# Patient Record
Sex: MALE | Race: BLACK OR AFRICAN AMERICAN | NOT HISPANIC OR LATINO | Employment: UNEMPLOYED | URBAN - METROPOLITAN AREA
[De-identification: names, ages, dates, MRNs, and addresses within clinical notes are randomized per-mention and may not be internally consistent; named-entity substitution may affect disease eponyms.]

---
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
## 2019-01-30 ENCOUNTER — HOSPITAL ENCOUNTER (EMERGENCY)
Facility: HOSPITAL | Age: 19
Discharge: HOME | End: 2019-01-31
Attending: EMERGENCY MEDICINE | Admitting: EMERGENCY MEDICINE

## 2019-01-30 DIAGNOSIS — Z20.2 STD EXPOSURE: Primary | ICD-10-CM

## 2019-01-30 PROCEDURE — 87591 N.GONORRHOEAE DNA AMP PROB: CPT | Performed by: EMERGENCY MEDICINE

## 2019-01-30 PROCEDURE — 99283 EMERGENCY DEPT VISIT LOW MDM: CPT

## 2019-01-30 PROCEDURE — 87491 CHLMYD TRACH DNA AMP PROBE: CPT | Performed by: EMERGENCY MEDICINE

## 2019-01-30 PROCEDURE — 81001 URINALYSIS AUTO W/SCOPE: CPT | Performed by: EMERGENCY MEDICINE

## 2019-01-30 RX ORDER — AZITHROMYCIN 250 MG/1
1000 TABLET, FILM COATED ORAL ONCE
Status: COMPLETED | OUTPATIENT
Start: 2019-01-30 | End: 2019-01-31

## 2019-01-30 RX ORDER — CEFTRIAXONE SODIUM 250 MG/1
250 INJECTION, POWDER, FOR SOLUTION INTRAMUSCULAR; INTRAVENOUS ONCE
Status: COMPLETED | OUTPATIENT
Start: 2019-01-30 | End: 2019-01-31

## 2019-01-30 RX ORDER — LIDOCAINE HYDROCHLORIDE 10 MG/ML
0.9 INJECTION, SOLUTION EPIDURAL; INFILTRATION; INTRACAUDAL; PERINEURAL ONCE
Status: COMPLETED | OUTPATIENT
Start: 2019-01-30 | End: 2019-01-31

## 2019-01-31 VITALS
RESPIRATION RATE: 16 BRPM | TEMPERATURE: 97.2 F | HEART RATE: 78 BPM | WEIGHT: 158.3 LBS | HEIGHT: 69 IN | OXYGEN SATURATION: 99 % | SYSTOLIC BLOOD PRESSURE: 146 MMHG | BODY MASS INDEX: 23.44 KG/M2 | DIASTOLIC BLOOD PRESSURE: 80 MMHG

## 2019-01-31 LAB
BACTERIA UR QL AUTO: ABNORMAL /HPF
BILIRUB UR QL STRIP: NEGATIVE
CLARITY UR: ABNORMAL
COLOR UR: YELLOW
GLUCOSE UR STRIP-MCNC: NEGATIVE MG/DL
HGB UR QL STRIP.AUTO: NEGATIVE
HYALINE CASTS UR QL AUTO: ABNORMAL /LPF
KETONES UR QL STRIP: ABNORMAL
LEUKOCYTE ESTERASE UR QL STRIP.AUTO: ABNORMAL
NITRITE UR QL STRIP: NEGATIVE
PH UR STRIP.AUTO: 6.5 [PH] (ref 5–8)
PROT UR QL STRIP: NEGATIVE
RBC # UR: ABNORMAL /HPF
REF LAB TEST METHOD: ABNORMAL
SP GR UR STRIP: 1.03 (ref 1–1.03)
SQUAMOUS #/AREA URNS HPF: ABNORMAL /HPF
UROBILINOGEN UR QL STRIP: ABNORMAL
WBC UR QL AUTO: ABNORMAL /HPF

## 2019-01-31 PROCEDURE — 25010000002 CEFTRIAXONE PER 250 MG: Performed by: EMERGENCY MEDICINE

## 2019-01-31 PROCEDURE — 96372 THER/PROPH/DIAG INJ SC/IM: CPT

## 2019-01-31 RX ADMIN — AZITHROMYCIN 1000 MG: 250 TABLET, FILM COATED ORAL at 00:16

## 2019-01-31 RX ADMIN — CEFTRIAXONE SODIUM 250 MG: 250 INJECTION, POWDER, FOR SOLUTION INTRAMUSCULAR; INTRAVENOUS at 00:17

## 2019-01-31 RX ADMIN — LIDOCAINE HYDROCHLORIDE 0.9 ML: 10 INJECTION, SOLUTION EPIDURAL; INFILTRATION; INTRACAUDAL; PERINEURAL at 00:17

## 2019-02-03 LAB
C TRACH RRNA SPEC DONR QL NAA+PROBE: POSITIVE
N GONORRHOEA DNA SPEC QL NAA+PROBE: POSITIVE

## 2020-09-17 ENCOUNTER — OFFICE VISIT (OUTPATIENT)
Dept: FAMILY MEDICINE CLINIC | Facility: CLINIC | Age: 20
End: 2020-09-17

## 2020-09-17 VITALS
WEIGHT: 160 LBS | SYSTOLIC BLOOD PRESSURE: 122 MMHG | HEART RATE: 52 BPM | HEIGHT: 70 IN | OXYGEN SATURATION: 99 % | RESPIRATION RATE: 16 BRPM | TEMPERATURE: 98.5 F | BODY MASS INDEX: 22.9 KG/M2 | DIASTOLIC BLOOD PRESSURE: 78 MMHG

## 2020-09-17 DIAGNOSIS — F32.9 MAJOR DEPRESSIVE DISORDER WITH CURRENT ACTIVE EPISODE, UNSPECIFIED DEPRESSION EPISODE SEVERITY, UNSPECIFIED WHETHER RECURRENT: ICD-10-CM

## 2020-09-17 DIAGNOSIS — F90.1 ATTENTION DEFICIT HYPERACTIVITY DISORDER (ADHD), PREDOMINANTLY HYPERACTIVE TYPE: Primary | ICD-10-CM

## 2020-09-17 PROCEDURE — 99204 OFFICE O/P NEW MOD 45 MIN: CPT | Performed by: INTERNAL MEDICINE

## 2020-09-17 RX ORDER — DEXTROAMPHETAMINE SACCHARATE, AMPHETAMINE ASPARTATE, DEXTROAMPHETAMINE SULFATE AND AMPHETAMINE SULFATE 1.25; 1.25; 1.25; 1.25 MG/1; MG/1; MG/1; MG/1
5 TABLET ORAL DAILY
COMMUNITY
End: 2020-09-17

## 2020-09-17 RX ORDER — ACETAMINOPHEN 500 MG
500 TABLET ORAL
COMMUNITY
End: 2021-11-15 | Stop reason: HOSPADM

## 2020-09-17 RX ORDER — GUANFACINE 2 MG/1
2 TABLET ORAL DAILY
COMMUNITY
End: 2020-09-17

## 2020-09-17 RX ORDER — IBUPROFEN 400 MG/1
400 TABLET ORAL
COMMUNITY
End: 2021-11-15 | Stop reason: HOSPADM

## 2020-09-17 RX ORDER — MONTELUKAST SODIUM 10 MG/1
10 TABLET ORAL DAILY
COMMUNITY
End: 2021-11-11
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
## 2020-09-18 DIAGNOSIS — R73.09 ELEVATED GLUCOSE: Primary | ICD-10-CM

## 2020-09-18 LAB
ALBUMIN SERPL-MCNC: 4.8 G/DL (ref 3.5–5.2)
ALBUMIN/GLOB SERPL: 2 G/DL
ALP SERPL-CCNC: 87 U/L (ref 39–117)
ALT SERPL-CCNC: 25 U/L (ref 1–41)
AST SERPL-CCNC: 18 U/L (ref 1–40)
BASOPHILS # BLD AUTO: 0.02 10*3/MM3 (ref 0–0.2)
BASOPHILS NFR BLD AUTO: 0.4 % (ref 0–1.5)
BILIRUB SERPL-MCNC: 0.2 MG/DL (ref 0–1.2)
BUN SERPL-MCNC: 13 MG/DL (ref 6–20)
BUN/CREAT SERPL: 10.7 (ref 7–25)
CALCIUM SERPL-MCNC: 9.6 MG/DL (ref 8.6–10.5)
CHLORIDE SERPL-SCNC: 104 MMOL/L (ref 98–107)
CHOLEST SERPL-MCNC: 163 MG/DL (ref 0–200)
CO2 SERPL-SCNC: 27.6 MMOL/L (ref 22–29)
CREAT SERPL-MCNC: 1.21 MG/DL (ref 0.76–1.27)
EOSINOPHIL # BLD AUTO: 0.12 10*3/MM3 (ref 0–0.4)
EOSINOPHIL NFR BLD AUTO: 2.3 % (ref 0.3–6.2)
ERYTHROCYTE [DISTWIDTH] IN BLOOD BY AUTOMATED COUNT: 13.7 % (ref 12.3–15.4)
GLOBULIN SER CALC-MCNC: 2.4 GM/DL
GLUCOSE SERPL-MCNC: 120 MG/DL (ref 65–99)
HCT VFR BLD AUTO: 42.8 % (ref 37.5–51)
HDLC SERPL-MCNC: 56 MG/DL (ref 40–60)
HGB BLD-MCNC: 15 G/DL (ref 13–17.7)
IMM GRANULOCYTES # BLD AUTO: 0.01 10*3/MM3 (ref 0–0.05)
IMM GRANULOCYTES NFR BLD AUTO: 0.2 % (ref 0–0.5)
LDLC SERPL CALC-MCNC: 76 MG/DL (ref 0–100)
LYMPHOCYTES # BLD AUTO: 2.1 10*3/MM3 (ref 0.7–3.1)
LYMPHOCYTES NFR BLD AUTO: 40.4 % (ref 19.6–45.3)
MCH RBC QN AUTO: 31 PG (ref 26.6–33)
MCHC RBC AUTO-ENTMCNC: 35 G/DL (ref 31.5–35.7)
MCV RBC AUTO: 88.4 FL (ref 79–97)
MONOCYTES # BLD AUTO: 0.31 10*3/MM3 (ref 0.1–0.9)
MONOCYTES NFR BLD AUTO: 6 % (ref 5–12)
NEUTROPHILS # BLD AUTO: 2.64 10*3/MM3 (ref 1.7–7)
NEUTROPHILS NFR BLD AUTO: 50.7 % (ref 42.7–76)
NRBC BLD AUTO-RTO: 0 /100 WBC (ref 0–0.2)
PLATELET # BLD AUTO: 237 10*3/MM3 (ref 140–450)
POTASSIUM SERPL-SCNC: 4.6 MMOL/L (ref 3.5–5.2)
PROT SERPL-MCNC: 7.2 G/DL (ref 6–8.5)
RBC # BLD AUTO: 4.84 10*6/MM3 (ref 4.14–5.8)
SODIUM SERPL-SCNC: 142 MMOL/L (ref 136–145)
T4 FREE SERPL-MCNC: 1.69 NG/DL (ref 0.93–1.7)
TRIGL SERPL-MCNC: 153 MG/DL (ref 0–150)
TSH SERPL DL<=0.005 MIU/L-ACNC: 1.69 UIU/ML (ref 0.27–4.2)
VLDLC SERPL CALC-MCNC: 30.6 MG/DL
WBC # BLD AUTO: 5.2 10*3/MM3 (ref 3.4–10.8)

## 2020-09-23 ENCOUNTER — RESULTS ENCOUNTER (OUTPATIENT)
Dept: FAMILY MEDICINE CLINIC | Facility: CLINIC | Age: 20
End: 2020-09-23

## 2020-09-23 DIAGNOSIS — R73.09 ELEVATED GLUCOSE: ICD-10-CM

## 2020-10-08 ENCOUNTER — OFFICE VISIT (OUTPATIENT)
Dept: FAMILY MEDICINE CLINIC | Facility: CLINIC | Age: 20
End: 2020-10-08

## 2020-10-08 VITALS
BODY MASS INDEX: 22.28 KG/M2 | OXYGEN SATURATION: 99 % | HEART RATE: 53 BPM | WEIGHT: 155.6 LBS | HEIGHT: 70 IN | SYSTOLIC BLOOD PRESSURE: 108 MMHG | TEMPERATURE: 99.7 F | DIASTOLIC BLOOD PRESSURE: 70 MMHG

## 2020-10-08 DIAGNOSIS — R73.09 ELEVATED GLUCOSE: Primary | ICD-10-CM

## 2020-10-08 LAB
EXPIRATION DATE: NORMAL
HBA1C MFR BLD: 5.7 %
Lab: NORMAL

## 2020-10-08 PROCEDURE — 83036 HEMOGLOBIN GLYCOSYLATED A1C: CPT | Performed by: INTERNAL MEDICINE

## 2020-10-08 PROCEDURE — 99213 OFFICE O/P EST LOW 20 MIN: CPT | Performed by: INTERNAL MEDICINE
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
## 2020-11-12 ENCOUNTER — OFFICE VISIT (OUTPATIENT)
Dept: FAMILY MEDICINE CLINIC | Facility: CLINIC | Age: 20
End: 2020-11-12

## 2020-11-12 VITALS
SYSTOLIC BLOOD PRESSURE: 130 MMHG | OXYGEN SATURATION: 98 % | HEART RATE: 57 BPM | HEIGHT: 70 IN | WEIGHT: 153.6 LBS | BODY MASS INDEX: 21.99 KG/M2 | DIASTOLIC BLOOD PRESSURE: 78 MMHG | RESPIRATION RATE: 16 BRPM

## 2020-11-12 DIAGNOSIS — Z00.00 ANNUAL PHYSICAL EXAM: Primary | ICD-10-CM

## 2020-11-12 DIAGNOSIS — E78.1 HYPERTRIGLYCERIDEMIA: ICD-10-CM

## 2020-11-12 PROCEDURE — 99395 PREV VISIT EST AGE 18-39: CPT | Performed by: INTERNAL MEDICINE
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
## 2020-12-10 ENCOUNTER — RESULTS ENCOUNTER (OUTPATIENT)
Dept: FAMILY MEDICINE CLINIC | Facility: CLINIC | Age: 20
End: 2020-12-10

## 2020-12-10 ENCOUNTER — OFFICE VISIT (OUTPATIENT)
Dept: FAMILY MEDICINE CLINIC | Facility: CLINIC | Age: 20
End: 2020-12-10

## 2020-12-10 VITALS
BODY MASS INDEX: 22.96 KG/M2 | SYSTOLIC BLOOD PRESSURE: 116 MMHG | RESPIRATION RATE: 16 BRPM | WEIGHT: 155 LBS | TEMPERATURE: 97.6 F | DIASTOLIC BLOOD PRESSURE: 78 MMHG | HEART RATE: 57 BPM | OXYGEN SATURATION: 99 % | HEIGHT: 69 IN

## 2020-12-10 DIAGNOSIS — R46.89 BEHAVIORAL CHANGE: ICD-10-CM

## 2020-12-10 DIAGNOSIS — R25.1 OCCASIONAL TREMORS: Primary | ICD-10-CM

## 2020-12-10 DIAGNOSIS — S06.9X9S TRAUMATIC BRAIN INJURY WITH LOSS OF CONSCIOUSNESS, SEQUELA (HCC): ICD-10-CM

## 2020-12-10 PROCEDURE — 99214 OFFICE O/P EST MOD 30 MIN: CPT | Performed by: INTERNAL MEDICINE
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
## 2021-01-06 ENCOUNTER — DOCUMENTATION (OUTPATIENT)
Dept: FAMILY MEDICINE CLINIC | Facility: CLINIC | Age: 21
End: 2021-01-06

## 2021-01-06 ENCOUNTER — HOSPITAL ENCOUNTER (EMERGENCY)
Facility: HOSPITAL | Age: 21
Discharge: HOME | End: 2021-01-06
Attending: EMERGENCY MEDICINE | Admitting: EMERGENCY MEDICINE

## 2021-01-06 ENCOUNTER — HOSPITAL ENCOUNTER (OUTPATIENT)
Dept: MRI IMAGING | Facility: HOSPITAL | Age: 21
Discharge: HOME | End: 2021-01-06
Admitting: INTERNAL MEDICINE

## 2021-01-06 VITALS
RESPIRATION RATE: 15 BRPM | HEIGHT: 70 IN | WEIGHT: 155 LBS | HEART RATE: 67 BPM | BODY MASS INDEX: 22.19 KG/M2 | OXYGEN SATURATION: 99 % | DIASTOLIC BLOOD PRESSURE: 74 MMHG | SYSTOLIC BLOOD PRESSURE: 112 MMHG | TEMPERATURE: 97.8 F

## 2021-01-06 DIAGNOSIS — S06.9X9S TRAUMATIC BRAIN INJURY WITH LOSS OF CONSCIOUSNESS, SEQUELA (HCC): ICD-10-CM

## 2021-01-06 DIAGNOSIS — Z87.828 OLD HEAD INJURY: Primary | ICD-10-CM

## 2021-01-06 LAB
ALBUMIN SERPL-MCNC: 4.7 G/DL (ref 3.5–5.2)
ALBUMIN/GLOB SERPL: 1.4 G/DL
ALP SERPL-CCNC: 75 U/L (ref 39–117)
ALT SERPL W P-5'-P-CCNC: 20 U/L (ref 1–41)
AMPHET+METHAMPHET UR QL: NEGATIVE
ANION GAP SERPL CALCULATED.3IONS-SCNC: 7.4 MMOL/L (ref 5–15)
AST SERPL-CCNC: 19 U/L (ref 1–40)
BARBITURATES UR QL SCN: NEGATIVE
BASOPHILS # BLD AUTO: 0.03 10*3/MM3 (ref 0–0.2)
BASOPHILS NFR BLD AUTO: 0.6 % (ref 0–1.5)
BENZODIAZ UR QL SCN: NEGATIVE
BILIRUB SERPL-MCNC: 0.3 MG/DL (ref 0–1.2)
BUN SERPL-MCNC: 15 MG/DL (ref 6–20)
BUN/CREAT SERPL: 13.8 (ref 7–25)
CALCIUM SPEC-SCNC: 9.6 MG/DL (ref 8.6–10.5)
CANNABINOIDS SERPL QL: POSITIVE
CHLORIDE SERPL-SCNC: 102 MMOL/L (ref 98–107)
CO2 SERPL-SCNC: 29.6 MMOL/L (ref 22–29)
COCAINE UR QL: NEGATIVE
CREAT SERPL-MCNC: 1.09 MG/DL (ref 0.76–1.27)
DEPRECATED RDW RBC AUTO: 42.2 FL (ref 37–54)
EOSINOPHIL # BLD AUTO: 0.08 10*3/MM3 (ref 0–0.4)
EOSINOPHIL NFR BLD AUTO: 1.7 % (ref 0.3–6.2)
ERYTHROCYTE [DISTWIDTH] IN BLOOD BY AUTOMATED COUNT: 13.4 % (ref 12.3–15.4)
ETHANOL BLD-MCNC: <10 MG/DL (ref 0–10)
ETHANOL UR QL: <0.01 %
GFR SERPL CREATININE-BSD FRML MDRD: 103 ML/MIN/1.73
GLOBULIN UR ELPH-MCNC: 3.4 GM/DL
GLUCOSE SERPL-MCNC: 91 MG/DL (ref 65–99)
HCT VFR BLD AUTO: 45.4 % (ref 37.5–51)
HGB BLD-MCNC: 15.5 G/DL (ref 13–17.7)
IMM GRANULOCYTES # BLD AUTO: 0.01 10*3/MM3 (ref 0–0.05)
IMM GRANULOCYTES NFR BLD AUTO: 0.2 % (ref 0–0.5)
INR PPP: 1.15 (ref 0.9–1.1)
LYMPHOCYTES # BLD AUTO: 2.24 10*3/MM3 (ref 0.7–3.1)
LYMPHOCYTES NFR BLD AUTO: 47.6 % (ref 19.6–45.3)
MCH RBC QN AUTO: 29.9 PG (ref 26.6–33)
MCHC RBC AUTO-ENTMCNC: 34.1 G/DL (ref 31.5–35.7)
MCV RBC AUTO: 87.5 FL (ref 79–97)
METHADONE UR QL SCN: NEGATIVE
MONOCYTES # BLD AUTO: 0.27 10*3/MM3 (ref 0.1–0.9)
MONOCYTES NFR BLD AUTO: 5.7 % (ref 5–12)
NEUTROPHILS NFR BLD AUTO: 2.08 10*3/MM3 (ref 1.7–7)
NEUTROPHILS NFR BLD AUTO: 44.2 % (ref 42.7–76)
NRBC BLD AUTO-RTO: 0 /100 WBC (ref 0–0.2)
OPIATES UR QL: NEGATIVE
OXYCODONE UR QL SCN: NEGATIVE
PLATELET # BLD AUTO: 242 10*3/MM3 (ref 140–450)
PMV BLD AUTO: 10.4 FL (ref 6–12)
POTASSIUM SERPL-SCNC: 4 MMOL/L (ref 3.5–5.2)
PROT SERPL-MCNC: 8.1 G/DL (ref 6–8.5)
PROTHROMBIN TIME: 14.5 SECONDS (ref 11.7–14.2)
RBC # BLD AUTO: 5.19 10*6/MM3 (ref 4.14–5.8)
SODIUM SERPL-SCNC: 139 MMOL/L (ref 136–145)
WBC # BLD AUTO: 4.71 10*3/MM3 (ref 3.4–10.8)

## 2021-01-06 PROCEDURE — 99283 EMERGENCY DEPT VISIT LOW MDM: CPT

## 2021-01-06 PROCEDURE — 85025 COMPLETE CBC W/AUTO DIFF WBC: CPT | Performed by: EMERGENCY MEDICINE

## 2021-01-06 PROCEDURE — 0 GADOBENATE DIMEGLUMINE 529 MG/ML SOLUTION: Performed by: INTERNAL MEDICINE

## 2021-01-06 PROCEDURE — 80307 DRUG TEST PRSMV CHEM ANLYZR: CPT | Performed by: EMERGENCY MEDICINE

## 2021-01-06 PROCEDURE — A9577 INJ MULTIHANCE: HCPCS | Performed by: INTERNAL MEDICINE

## 2021-01-06 PROCEDURE — 70553 MRI BRAIN STEM W/O & W/DYE: CPT

## 2021-01-06 PROCEDURE — 82077 ASSAY SPEC XCP UR&BREATH IA: CPT | Performed by: EMERGENCY MEDICINE

## 2021-01-06 PROCEDURE — 85610 PROTHROMBIN TIME: CPT | Performed by: EMERGENCY MEDICINE

## 2021-01-06 PROCEDURE — 80053 COMPREHEN METABOLIC PANEL: CPT | Performed by: EMERGENCY MEDICINE

## 2021-01-06 RX ORDER — SODIUM CHLORIDE 0.9 % (FLUSH) 0.9 %
10 SYRINGE (ML) INJECTION AS NEEDED
Status: DISCONTINUED | OUTPATIENT
Start: 2021-01-06 | End: 2021-01-06 | Stop reason: HOSPADM

## 2021-01-06 RX ADMIN — GADOBENATE DIMEGLUMINE 14 ML: 529 INJECTION, SOLUTION INTRAVENOUS at 08:41

## 2021-01-06 NOTE — ED TRIAGE NOTES
Pt to ER via PV. Pt had short term memory loss, tremors, and of the norm behavior x1 year but has been progressing. Pt had MRI this morning that was abnormal and pt was told to come to ER. Pt A&Ox4.     Patient in mask. This RN in appropriate PPE - including mask, goggles, and gloves during all of patient care.

## 2021-01-06 NOTE — ED NOTES
Pt reports intermittent tremors and short term memory loss for about a year. Pt was seen my PCP who ordered a MRI for earlier today. PCP called pt and told him to come to ER because his scan was abnormal. Occasional alcohol use & marijuana use. Pt a&ox4, abc's intact, NAD noted, ambulatory to room.    Pt noted to have mask on when this RN entered the room. This RN wore appropriate PPE throughout our encounter. Hand hygiene performed upon entering and exiting room.         Giselle Ann, RN  01/06/21 4709
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
## 2021-01-06 NOTE — PROGRESS NOTES
I received a call from Dr. Morgan radiologist at [facility] today regarding an unusual presentation he found in the patient's periaqueductal gray mater suggestive of possible Wernicke's encephalopathy.  The patient's mother had previously described episodes of shaking sensation of the patient's hands and episodic aggressive behavior followed by periods of amnesia.      I feel the patient needs urgent neurologic evaluation to prevent or minimize long-term effects of what could be Wernicke's encephalopathy with Korsakoff.      I discussed this with the patient and his mother who will be taking him to the emergency room at [facility] shortly.
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
## 2021-01-07 NOTE — DISCHARGE INSTRUCTIONS
If you would like to follow-up with a neurologist regarding your old head injury, call Dr. Cheung.  Return to the ER if develop any concerning or worsening symptoms.

## 2021-02-01 DIAGNOSIS — R40.20 LOSS OF CONSCIOUSNESS (HCC): Primary | ICD-10-CM

## 2021-04-16 ENCOUNTER — BULK ORDERING (OUTPATIENT)
Dept: CASE MANAGEMENT | Facility: OTHER | Age: 21
End: 2021-04-16

## 2021-04-16 DIAGNOSIS — Z23 IMMUNIZATION DUE: ICD-10-CM

## 2021-11-10 ENCOUNTER — TELEPHONE (OUTPATIENT)
Dept: FAMILY MEDICINE CLINIC | Facility: CLINIC | Age: 21
End: 2021-11-10

## 2021-11-10 NOTE — TELEPHONE ENCOUNTER
Caller: Kaylynn Sparks    Relationship to patient: Mother    Best call back number: [phone]*    New or established patient?  [] New  [x] Established    Date of discharge: UNKNOWN, PATIENT'S MOTHER COULD ONLY ADVISE 2-3 WEEKS AGO    Facility discharged from: [facility]    Diagnosis/Symptoms: FATIGUE, ELEVATED BLOOD GLUCOSE    Length of stay (If applicable): EMERGENCY ROOM VISIT    PATIENT'S MOTHER ALSO WOULD LIKE A CALLBACK TO ADVISE IF DR. GEORGE WOULD LIKE HER TO BE PRESENT FOR THE VISIT, AS SHE HAS IN THE PAST.

## 2021-11-11 ENCOUNTER — APPOINTMENT (OUTPATIENT)
Dept: GENERAL RADIOLOGY | Facility: HOSPITAL | Age: 21
End: 2021-11-11

## 2021-11-11 ENCOUNTER — HOSPITAL ENCOUNTER (INPATIENT)
Facility: HOSPITAL | Age: 21
LOS: 4 days | Discharge: HOME | End: 2021-11-15
Attending: EMERGENCY MEDICINE | Admitting: INTERNAL MEDICINE

## 2021-11-11 DIAGNOSIS — E10.10 DIABETIC KETOACIDOSIS WITHOUT COMA ASSOCIATED WITH TYPE 1 DIABETES MELLITUS (HCC): Primary | ICD-10-CM

## 2021-11-11 LAB
ALBUMIN SERPL-MCNC: 4.8 G/DL (ref 3.5–5.2)
ALBUMIN/GLOB SERPL: 1.3 G/DL
ALP SERPL-CCNC: 101 U/L (ref 39–117)
ALT SERPL W P-5'-P-CCNC: 26 U/L (ref 1–41)
ANION GAP SERPL CALCULATED.3IONS-SCNC: 20 MMOL/L (ref 5–15)
ANION GAP SERPL CALCULATED.3IONS-SCNC: 8.2 MMOL/L (ref 5–15)
ANION GAP SERPL CALCULATED.3IONS-SCNC: 9.6 MMOL/L (ref 5–15)
AST SERPL-CCNC: 13 U/L (ref 1–40)
ATMOSPHERIC PRESS: 748.1 MMHG
B-OH-BUTYR SERPL-SCNC: 7.13 MMOL/L (ref 0.02–0.27)
BACTERIA UR QL AUTO: NORMAL /HPF
BASE EXCESS BLDV CALC-SCNC: -15.7 MMOL/L (ref -2–2)
BASOPHILS # BLD AUTO: 0.04 10*3/MM3 (ref 0–0.2)
BASOPHILS NFR BLD AUTO: 0.9 % (ref 0–1.5)
BDY SITE: ABNORMAL
BILIRUB SERPL-MCNC: 0.3 MG/DL (ref 0–1.2)
BILIRUB UR QL STRIP: NEGATIVE
BUN SERPL-MCNC: 10 MG/DL (ref 6–20)
BUN SERPL-MCNC: 11 MG/DL (ref 6–20)
BUN SERPL-MCNC: 14 MG/DL (ref 6–20)
BUN/CREAT SERPL: 10.7 (ref 7–25)
BUN/CREAT SERPL: 10.8 (ref 7–25)
BUN/CREAT SERPL: 9.2 (ref 7–25)
CALCIUM SPEC-SCNC: 8.3 MG/DL (ref 8.6–10.5)
CALCIUM SPEC-SCNC: 8.3 MG/DL (ref 8.6–10.5)
CALCIUM SPEC-SCNC: 9.7 MG/DL (ref 8.6–10.5)
CHLORIDE SERPL-SCNC: 107 MMOL/L (ref 98–107)
CHLORIDE SERPL-SCNC: 115 MMOL/L (ref 98–107)
CHLORIDE SERPL-SCNC: 115 MMOL/L (ref 98–107)
CLARITY UR: CLEAR
CO2 SERPL-SCNC: 12 MMOL/L (ref 22–29)
CO2 SERPL-SCNC: 17.4 MMOL/L (ref 22–29)
CO2 SERPL-SCNC: 18.8 MMOL/L (ref 22–29)
COLOR UR: YELLOW
CORTIS SERPL-MCNC: 15.53 MCG/DL
CREAT SERPL-MCNC: 1.02 MG/DL (ref 0.76–1.27)
CREAT SERPL-MCNC: 1.09 MG/DL (ref 0.76–1.27)
CREAT SERPL-MCNC: 1.31 MG/DL (ref 0.76–1.27)
D-LACTATE SERPL-SCNC: 0.8 MMOL/L (ref 0.5–2)
DEPRECATED RDW RBC AUTO: 43 FL (ref 37–54)
EOSINOPHIL # BLD AUTO: 0.09 10*3/MM3 (ref 0–0.4)
EOSINOPHIL NFR BLD AUTO: 2 % (ref 0.3–6.2)
ERYTHROCYTE [DISTWIDTH] IN BLOOD BY AUTOMATED COUNT: 14 % (ref 12.3–15.4)
GFR SERPL CREATININE-BSD FRML MDRD: 102 ML/MIN/1.73
GFR SERPL CREATININE-BSD FRML MDRD: 110 ML/MIN/1.73
GFR SERPL CREATININE-BSD FRML MDRD: 82 ML/MIN/1.73
GLOBULIN UR ELPH-MCNC: 3.7 GM/DL
GLUCOSE BLDC GLUCOMTR-MCNC: 184 MG/DL (ref 70–130)
GLUCOSE BLDC GLUCOMTR-MCNC: 192 MG/DL (ref 70–130)
GLUCOSE BLDC GLUCOMTR-MCNC: 192 MG/DL (ref 70–130)
GLUCOSE BLDC GLUCOMTR-MCNC: 193 MG/DL (ref 70–130)
GLUCOSE BLDC GLUCOMTR-MCNC: 207 MG/DL (ref 70–130)
GLUCOSE BLDC GLUCOMTR-MCNC: 213 MG/DL (ref 70–130)
GLUCOSE BLDC GLUCOMTR-MCNC: 213 MG/DL (ref 70–130)
GLUCOSE BLDC GLUCOMTR-MCNC: 218 MG/DL (ref 70–130)
GLUCOSE BLDC GLUCOMTR-MCNC: 220 MG/DL (ref 70–130)
GLUCOSE BLDC GLUCOMTR-MCNC: 238 MG/DL (ref 70–130)
GLUCOSE BLDC GLUCOMTR-MCNC: 239 MG/DL (ref 70–130)
GLUCOSE BLDC GLUCOMTR-MCNC: 292 MG/DL (ref 70–130)
GLUCOSE SERPL-MCNC: 192 MG/DL (ref 65–99)
GLUCOSE SERPL-MCNC: 208 MG/DL (ref 65–99)
GLUCOSE SERPL-MCNC: 317 MG/DL (ref 65–99)
GLUCOSE UR STRIP-MCNC: ABNORMAL MG/DL
HBA1C MFR BLD: 13.8 % (ref 4.8–5.6)
HCO3 BLDV-SCNC: 12.2 MMOL/L (ref 22–28)
HCT VFR BLD AUTO: 46.8 % (ref 37.5–51)
HGB BLD-MCNC: 16.3 G/DL (ref 13–17.7)
HGB UR QL STRIP.AUTO: ABNORMAL
HOLD SPECIMEN: NORMAL
HOLD SPECIMEN: NORMAL
HYALINE CASTS UR QL AUTO: NORMAL /LPF
IMM GRANULOCYTES # BLD AUTO: 0.01 10*3/MM3 (ref 0–0.05)
IMM GRANULOCYTES NFR BLD AUTO: 0.2 % (ref 0–0.5)
KETONES UR QL STRIP: ABNORMAL
LEUKOCYTE ESTERASE UR QL STRIP.AUTO: NEGATIVE
LYMPHOCYTES # BLD AUTO: 1.94 10*3/MM3 (ref 0.7–3.1)
LYMPHOCYTES NFR BLD AUTO: 42.1 % (ref 19.6–45.3)
MAGNESIUM SERPL-MCNC: 1.8 MG/DL (ref 1.6–2.6)
MAGNESIUM SERPL-MCNC: 1.9 MG/DL (ref 1.6–2.6)
MAGNESIUM SERPL-MCNC: 2.1 MG/DL (ref 1.6–2.6)
MCH RBC QN AUTO: 29.8 PG (ref 26.6–33)
MCHC RBC AUTO-ENTMCNC: 34.8 G/DL (ref 31.5–35.7)
MCV RBC AUTO: 85.6 FL (ref 79–97)
MODALITY: ABNORMAL
MONOCYTES # BLD AUTO: 0.24 10*3/MM3 (ref 0.1–0.9)
MONOCYTES NFR BLD AUTO: 5.2 % (ref 5–12)
NEUTROPHILS NFR BLD AUTO: 2.29 10*3/MM3 (ref 1.7–7)
NEUTROPHILS NFR BLD AUTO: 49.6 % (ref 42.7–76)
NITRITE UR QL STRIP: NEGATIVE
NRBC BLD AUTO-RTO: 0 /100 WBC (ref 0–0.2)
OSMOLALITY SERPL: 318 MOSM/KG (ref 275–300)
PCO2 BLDV: 35.3 MM HG (ref 41–51)
PH BLDV: 7.15 PH UNITS (ref 7.31–7.41)
PH UR STRIP.AUTO: 5.5 [PH] (ref 5–8)
PHOSPHATE SERPL-MCNC: 1.6 MG/DL (ref 2.5–4.5)
PHOSPHATE SERPL-MCNC: 1.7 MG/DL (ref 2.5–4.5)
PHOSPHATE SERPL-MCNC: 3.3 MG/DL (ref 2.5–4.5)
PLATELET # BLD AUTO: 230 10*3/MM3 (ref 140–450)
PMV BLD AUTO: 11.3 FL (ref 6–12)
PO2 BLDV: 43.1 MM HG (ref 35–45)
POTASSIUM SERPL-SCNC: 3.4 MMOL/L (ref 3.5–5.2)
POTASSIUM SERPL-SCNC: 3.5 MMOL/L (ref 3.5–5.2)
POTASSIUM SERPL-SCNC: 3.7 MMOL/L (ref 3.5–5.2)
POTASSIUM SERPL-SCNC: 4.1 MMOL/L (ref 3.5–5.2)
PROT SERPL-MCNC: 8.5 G/DL (ref 6–8.5)
PROT UR QL STRIP: ABNORMAL
RBC # BLD AUTO: 5.47 10*6/MM3 (ref 4.14–5.8)
RBC # UR: NORMAL /HPF
REF LAB TEST METHOD: NORMAL
SAO2 % BLDCOA: 65.2 % (ref 92–99)
SARS-COV-2 RNA PNL SPEC NAA+PROBE: NOT DETECTED
SODIUM SERPL-SCNC: 139 MMOL/L (ref 136–145)
SODIUM SERPL-SCNC: 142 MMOL/L (ref 136–145)
SODIUM SERPL-SCNC: 142 MMOL/L (ref 136–145)
SP GR UR STRIP: >=1.03 (ref 1–1.03)
SQUAMOUS #/AREA URNS HPF: NORMAL /HPF
TOTAL RATE: 20 BREATHS/MINUTE
TSH SERPL DL<=0.05 MIU/L-ACNC: 0.97 UIU/ML (ref 0.27–4.2)
UROBILINOGEN UR QL STRIP: ABNORMAL
WBC # BLD AUTO: 4.61 10*3/MM3 (ref 3.4–10.8)
WBC UR QL AUTO: NORMAL /HPF
WHOLE BLOOD HOLD SPECIMEN: NORMAL
WHOLE BLOOD HOLD SPECIMEN: NORMAL

## 2021-11-11 PROCEDURE — 83930 ASSAY OF BLOOD OSMOLALITY: CPT | Performed by: PHYSICIAN ASSISTANT

## 2021-11-11 PROCEDURE — 71045 X-RAY EXAM CHEST 1 VIEW: CPT

## 2021-11-11 PROCEDURE — 84132 ASSAY OF SERUM POTASSIUM: CPT | Performed by: PHYSICIAN ASSISTANT

## 2021-11-11 PROCEDURE — 83036 HEMOGLOBIN GLYCOSYLATED A1C: CPT | Performed by: PHYSICIAN ASSISTANT

## 2021-11-11 PROCEDURE — 80053 COMPREHEN METABOLIC PANEL: CPT | Performed by: PHYSICIAN ASSISTANT

## 2021-11-11 PROCEDURE — 82962 GLUCOSE BLOOD TEST: CPT

## 2021-11-11 PROCEDURE — 87635 SARS-COV-2 COVID-19 AMP PRB: CPT | Performed by: PHYSICIAN ASSISTANT

## 2021-11-11 PROCEDURE — 63710000001 INSULIN GLARGINE PER 5 UNITS: Performed by: INTERNAL MEDICINE

## 2021-11-11 PROCEDURE — 82010 KETONE BODYS QUAN: CPT | Performed by: PHYSICIAN ASSISTANT

## 2021-11-11 PROCEDURE — 82803 BLOOD GASES ANY COMBINATION: CPT

## 2021-11-11 PROCEDURE — 84443 ASSAY THYROID STIM HORMONE: CPT | Performed by: INTERNAL MEDICINE

## 2021-11-11 PROCEDURE — 85025 COMPLETE CBC W/AUTO DIFF WBC: CPT | Performed by: PHYSICIAN ASSISTANT

## 2021-11-11 PROCEDURE — 84100 ASSAY OF PHOSPHORUS: CPT | Performed by: PHYSICIAN ASSISTANT

## 2021-11-11 PROCEDURE — 83605 ASSAY OF LACTIC ACID: CPT | Performed by: INTERNAL MEDICINE

## 2021-11-11 PROCEDURE — 63710000001 INSULIN LISPRO (HUMAN) PER 5 UNITS: Performed by: INTERNAL MEDICINE

## 2021-11-11 PROCEDURE — 82533 TOTAL CORTISOL: CPT | Performed by: INTERNAL MEDICINE

## 2021-11-11 PROCEDURE — 99284 EMERGENCY DEPT VISIT MOD MDM: CPT

## 2021-11-11 PROCEDURE — 81001 URINALYSIS AUTO W/SCOPE: CPT | Performed by: PHYSICIAN ASSISTANT

## 2021-11-11 PROCEDURE — 25010000002 ONDANSETRON PER 1 MG: Performed by: PHYSICIAN ASSISTANT

## 2021-11-11 PROCEDURE — 80048 BASIC METABOLIC PNL TOTAL CA: CPT | Performed by: PHYSICIAN ASSISTANT

## 2021-11-11 PROCEDURE — 83735 ASSAY OF MAGNESIUM: CPT | Performed by: PHYSICIAN ASSISTANT

## 2021-11-11 RX ORDER — ACETAMINOPHEN 325 MG/1
650 TABLET ORAL EVERY 4 HOURS PRN
Status: DISCONTINUED | OUTPATIENT
Start: 2021-11-11 | End: 2021-11-15 | Stop reason: HOSPADM

## 2021-11-11 RX ORDER — INSULIN LISPRO 100 [IU]/ML
0-9 INJECTION, SOLUTION INTRAVENOUS; SUBCUTANEOUS EVERY 6 HOURS
Status: DISCONTINUED | OUTPATIENT
Start: 2021-11-11 | End: 2021-11-12

## 2021-11-11 RX ORDER — ONDANSETRON 2 MG/ML
4 INJECTION INTRAMUSCULAR; INTRAVENOUS ONCE
Status: COMPLETED | OUTPATIENT
Start: 2021-11-11 | End: 2021-11-11

## 2021-11-11 RX ORDER — DEXTROSE MONOHYDRATE 25 G/50ML
12.5 INJECTION, SOLUTION INTRAVENOUS AS NEEDED
Status: DISCONTINUED | OUTPATIENT
Start: 2021-11-11 | End: 2021-11-11

## 2021-11-11 RX ORDER — MAGNESIUM SULFATE HEPTAHYDRATE 40 MG/ML
2 INJECTION, SOLUTION INTRAVENOUS AS NEEDED
Status: DISCONTINUED | OUTPATIENT
Start: 2021-11-11 | End: 2021-11-15 | Stop reason: HOSPADM

## 2021-11-11 RX ORDER — DEXTROSE MONOHYDRATE 25 G/50ML
25 INJECTION, SOLUTION INTRAVENOUS
Status: DISCONTINUED | OUTPATIENT
Start: 2021-11-11 | End: 2021-11-15 | Stop reason: HOSPADM

## 2021-11-11 RX ORDER — CHOLECALCIFEROL (VITAMIN D3) 125 MCG
5 CAPSULE ORAL NIGHTLY PRN
Status: DISCONTINUED | OUTPATIENT
Start: 2021-11-11 | End: 2021-11-15 | Stop reason: HOSPADM

## 2021-11-11 RX ORDER — DEXTROSE AND SODIUM CHLORIDE 5; .9 G/100ML; G/100ML
150 INJECTION, SOLUTION INTRAVENOUS CONTINUOUS PRN
Status: DISCONTINUED | OUTPATIENT
Start: 2021-11-11 | End: 2021-11-11

## 2021-11-11 RX ORDER — SODIUM CHLORIDE 9 MG/ML
10 INJECTION, SOLUTION INTRAVENOUS CONTINUOUS PRN
Status: DISCONTINUED | OUTPATIENT
Start: 2021-11-11 | End: 2021-11-11

## 2021-11-11 RX ORDER — SODIUM CHLORIDE 0.9 % (FLUSH) 0.9 %
10 SYRINGE (ML) INJECTION AS NEEDED
Status: DISCONTINUED | OUTPATIENT
Start: 2021-11-11 | End: 2021-11-11

## 2021-11-11 RX ORDER — ACETAMINOPHEN 650 MG/1
650 SUPPOSITORY RECTAL EVERY 4 HOURS PRN
Status: DISCONTINUED | OUTPATIENT
Start: 2021-11-11 | End: 2021-11-15 | Stop reason: HOSPADM

## 2021-11-11 RX ORDER — SODIUM CHLORIDE AND POTASSIUM CHLORIDE 150; 900 MG/100ML; MG/100ML
250 INJECTION, SOLUTION INTRAVENOUS CONTINUOUS PRN
Status: DISCONTINUED | OUTPATIENT
Start: 2021-11-11 | End: 2021-11-11

## 2021-11-11 RX ORDER — SODIUM CHLORIDE 0.9 % (FLUSH) 0.9 %
10 SYRINGE (ML) INJECTION AS NEEDED
Status: DISCONTINUED | OUTPATIENT
Start: 2021-11-11 | End: 2021-11-15 | Stop reason: HOSPADM

## 2021-11-11 RX ORDER — MAGNESIUM SULFATE HEPTAHYDRATE 40 MG/ML
4 INJECTION, SOLUTION INTRAVENOUS AS NEEDED
Status: DISCONTINUED | OUTPATIENT
Start: 2021-11-11 | End: 2021-11-15 | Stop reason: HOSPADM

## 2021-11-11 RX ORDER — ACETAMINOPHEN 160 MG/5ML
650 SOLUTION ORAL EVERY 4 HOURS PRN
Status: DISCONTINUED | OUTPATIENT
Start: 2021-11-11 | End: 2021-11-15 | Stop reason: HOSPADM

## 2021-11-11 RX ORDER — POTASSIUM CHLORIDE 750 MG/1
40 TABLET, FILM COATED, EXTENDED RELEASE ORAL AS NEEDED
Status: DISCONTINUED | OUTPATIENT
Start: 2021-11-11 | End: 2021-11-15 | Stop reason: HOSPADM

## 2021-11-11 RX ORDER — DEXTROSE, SODIUM CHLORIDE, AND POTASSIUM CHLORIDE 5; .45; .15 G/100ML; G/100ML; G/100ML
150 INJECTION INTRAVENOUS CONTINUOUS PRN
Status: DISCONTINUED | OUTPATIENT
Start: 2021-11-11 | End: 2021-11-11

## 2021-11-11 RX ORDER — SODIUM CHLORIDE 0.9 % (FLUSH) 0.9 %
10 SYRINGE (ML) INJECTION ONCE AS NEEDED
Status: DISCONTINUED | OUTPATIENT
Start: 2021-11-11 | End: 2021-11-11

## 2021-11-11 RX ORDER — SODIUM CHLORIDE AND POTASSIUM CHLORIDE 300; 900 MG/100ML; MG/100ML
250 INJECTION, SOLUTION INTRAVENOUS CONTINUOUS PRN
Status: DISCONTINUED | OUTPATIENT
Start: 2021-11-11 | End: 2021-11-11

## 2021-11-11 RX ORDER — DEXTROSE, SODIUM CHLORIDE, AND POTASSIUM CHLORIDE 5; .45; .3 G/100ML; G/100ML; G/100ML
150 INJECTION INTRAVENOUS CONTINUOUS PRN
Status: DISCONTINUED | OUTPATIENT
Start: 2021-11-11 | End: 2021-11-11

## 2021-11-11 RX ORDER — POTASSIUM CHLORIDE, DEXTROSE MONOHYDRATE AND SODIUM CHLORIDE 300; 5; 900 MG/100ML; G/100ML; MG/100ML
150 INJECTION, SOLUTION INTRAVENOUS CONTINUOUS PRN
Status: DISCONTINUED | OUTPATIENT
Start: 2021-11-11 | End: 2021-11-11

## 2021-11-11 RX ORDER — HYDROCODONE BITARTRATE AND ACETAMINOPHEN 5; 325 MG/1; MG/1
1 TABLET ORAL EVERY 4 HOURS PRN
Status: DISCONTINUED | OUTPATIENT
Start: 2021-11-11 | End: 2021-11-15 | Stop reason: HOSPADM

## 2021-11-11 RX ORDER — CALCIUM CARBONATE 200(500)MG
2 TABLET,CHEWABLE ORAL 2 TIMES DAILY PRN
Status: DISCONTINUED | OUTPATIENT
Start: 2021-11-11 | End: 2021-11-15 | Stop reason: HOSPADM

## 2021-11-11 RX ORDER — POTASSIUM CHLORIDE 1.5 G/1.77G
40 POWDER, FOR SOLUTION ORAL AS NEEDED
Status: DISCONTINUED | OUTPATIENT
Start: 2021-11-11 | End: 2021-11-15 | Stop reason: HOSPADM

## 2021-11-11 RX ORDER — DEXTROSE AND SODIUM CHLORIDE 5; .45 G/100ML; G/100ML
150 INJECTION, SOLUTION INTRAVENOUS CONTINUOUS PRN
Status: DISCONTINUED | OUTPATIENT
Start: 2021-11-11 | End: 2021-11-11

## 2021-11-11 RX ORDER — CALCIUM GLUCONATE 20 MG/ML
1 INJECTION, SOLUTION INTRAVENOUS AS NEEDED
Status: DISCONTINUED | OUTPATIENT
Start: 2021-11-11 | End: 2021-11-15 | Stop reason: HOSPADM

## 2021-11-11 RX ORDER — SODIUM CHLORIDE 9 MG/ML
250 INJECTION, SOLUTION INTRAVENOUS CONTINUOUS PRN
Status: DISCONTINUED | OUTPATIENT
Start: 2021-11-11 | End: 2021-11-11

## 2021-11-11 RX ORDER — SODIUM CHLORIDE 0.9 % (FLUSH) 0.9 %
3 SYRINGE (ML) INJECTION EVERY 12 HOURS SCHEDULED
Status: DISCONTINUED | OUTPATIENT
Start: 2021-11-11 | End: 2021-11-15 | Stop reason: HOSPADM

## 2021-11-11 RX ORDER — SODIUM CHLORIDE 0.9 % (FLUSH) 0.9 %
10 SYRINGE (ML) INJECTION EVERY 12 HOURS SCHEDULED
Status: DISCONTINUED | OUTPATIENT
Start: 2021-11-11 | End: 2021-11-15 | Stop reason: HOSPADM

## 2021-11-11 RX ORDER — INSULIN GLARGINE 100 [IU]/ML
30 INJECTION, SOLUTION SUBCUTANEOUS NIGHTLY
Status: DISCONTINUED | OUTPATIENT
Start: 2021-11-11 | End: 2021-11-13

## 2021-11-11 RX ORDER — SODIUM CHLORIDE 450 MG/100ML
250 INJECTION, SOLUTION INTRAVENOUS CONTINUOUS PRN
Status: DISCONTINUED | OUTPATIENT
Start: 2021-11-11 | End: 2021-11-11

## 2021-11-11 RX ORDER — ONDANSETRON 2 MG/ML
4 INJECTION INTRAMUSCULAR; INTRAVENOUS EVERY 6 HOURS PRN
Status: DISCONTINUED | OUTPATIENT
Start: 2021-11-11 | End: 2021-11-12 | Stop reason: SDUPTHER

## 2021-11-11 RX ORDER — DEXTROSE, SODIUM CHLORIDE, AND POTASSIUM CHLORIDE 5; .9; .15 G/100ML; G/100ML; G/100ML
150 INJECTION INTRAVENOUS CONTINUOUS PRN
Status: DISCONTINUED | OUTPATIENT
Start: 2021-11-11 | End: 2021-11-11

## 2021-11-11 RX ORDER — SODIUM CHLORIDE AND POTASSIUM CHLORIDE 150; 450 MG/100ML; MG/100ML
250 INJECTION, SOLUTION INTRAVENOUS CONTINUOUS PRN
Status: DISCONTINUED | OUTPATIENT
Start: 2021-11-11 | End: 2021-11-11

## 2021-11-11 RX ORDER — ONDANSETRON 4 MG/1
4 TABLET, FILM COATED ORAL EVERY 6 HOURS PRN
Status: DISCONTINUED | OUTPATIENT
Start: 2021-11-11 | End: 2021-11-12 | Stop reason: SDUPTHER

## 2021-11-11 RX ORDER — NICOTINE POLACRILEX 4 MG
15 LOZENGE BUCCAL
Status: DISCONTINUED | OUTPATIENT
Start: 2021-11-11 | End: 2021-11-15 | Stop reason: HOSPADM

## 2021-11-11 RX ADMIN — INSULIN LISPRO 4 UNITS: 100 INJECTION, SOLUTION INTRAVENOUS; SUBCUTANEOUS at 23:53

## 2021-11-11 RX ADMIN — ONDANSETRON 4 MG: 2 INJECTION INTRAMUSCULAR; INTRAVENOUS at 06:47

## 2021-11-11 RX ADMIN — INSULIN LISPRO 4 UNITS: 100 INJECTION, SOLUTION INTRAVENOUS; SUBCUTANEOUS at 18:55

## 2021-11-11 RX ADMIN — SODIUM CHLORIDE, PRESERVATIVE FREE 3 ML: 5 INJECTION INTRAVENOUS at 20:44

## 2021-11-11 RX ADMIN — POTASSIUM CHLORIDE, DEXTROSE MONOHYDRATE AND SODIUM CHLORIDE 150 ML/HR: 150; 5; 450 INJECTION, SOLUTION INTRAVENOUS at 10:28

## 2021-11-11 RX ADMIN — SODIUM CHLORIDE 1000 ML: 9 INJECTION, SOLUTION INTRAVENOUS at 06:42

## 2021-11-11 RX ADMIN — Medication 2 PACKET: at 20:35

## 2021-11-11 RX ADMIN — INSULIN HUMAN 6 UNITS/HR: 1 INJECTION, SOLUTION INTRAVENOUS at 08:42

## 2021-11-11 RX ADMIN — SODIUM CHLORIDE, PRESERVATIVE FREE 10 ML: 5 INJECTION INTRAVENOUS at 20:44

## 2021-11-11 RX ADMIN — SODIUM CHLORIDE 1000 ML: 9 INJECTION, SOLUTION INTRAVENOUS at 08:00

## 2021-11-11 RX ADMIN — INSULIN GLARGINE 30 UNITS: 100 INJECTION, SOLUTION SUBCUTANEOUS at 18:55

## 2021-11-11 RX ADMIN — POTASSIUM CHLORIDE, DEXTROSE MONOHYDRATE AND SODIUM CHLORIDE 150 ML/HR: 150; 5; 450 INJECTION, SOLUTION INTRAVENOUS at 17:01
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
## 2021-11-11 NOTE — ED PROVIDER NOTES
Pt presents to the ED complaining of elevated blood sugar and not feeling well for almost a month.  He states he went to an outlying ER approximately month ago and was told he had high blood sugar and was started on Metformin and discharged.  He states he has not felt well since that time with blood sugar between 4 and 500 for the past week.  He spoke with his PCP yesterday who advised him to come to the emergency room patient reports polyuria, polydipsia, blurred vision and weakness.  The patient denies fevers, chills, cough or known Covid exposure.  The patient states he did have some vomiting this morning which is what made him come to the ER.    On exam, pt is A&Ox3.  Mild distress.  Thin  PERRL, dry mucous membranes.  Normocephalic and atraumatic  Heart is RRR. Lungs are CTAB.   Abd is soft, nontender, nondistended, bowel sounds positive.   No pedal edema.  No calf tenderness.  Normal neuro exam      I agree with midlevel plan to check labs, urinalysis and venous blood gas.    PPE  Pt does not present with symptoms for COVID19; however, I was wearing a N95 mask and goggles throughout all patient interaction.    The patient's blood sugar is 317 but his anion gap is 20.  His venous blood gas is 7.148 with a beta hydroxybutyrate of 7.134.    We will initiate the DKA protocol and consult the pulmonologist for ICU admission.  I have discussed this with the patient and his mother.    The AMANDA and I have discussed this patient's history, physical exam, and treatment plan.  I have reviewed the documentation and personally had a face to face interaction with the patient. I affirm the documentation and agree with the treatment and plan.  The attached note describes my personal findings.           Jd Macedo MD  11/11/21 4436

## 2021-11-11 NOTE — PLAN OF CARE
Problem: Adult Inpatient Plan of Care  Goal: Plan of Care Review  11/11/2021 1645 by Marcos Cortes RN  Outcome: Ongoing, Progressing  11/11/2021 1641 by Marcos Cortes RN  Outcome: Ongoing, Progressing  Goal: Patient-Specific Goal (Individualized)  11/11/2021 1645 by Marcos Cortes RN  Outcome: Ongoing, Progressing  11/11/2021 1641 by Marcos Cortes RN  Outcome: Ongoing, Progressing  Goal: Absence of Hospital-Acquired Illness or Injury  11/11/2021 1645 by Marcos Cortes RN  Outcome: Ongoing, Progressing  11/11/2021 1641 by Marcos Cortes RN  Outcome: Ongoing, Progressing  Intervention: Identify and Manage Fall Risk  Recent Flowsheet Documentation  Taken 11/11/2021 1605 by Marcos Cortes RN  Safety Promotion/Fall Prevention: safety round/check completed  Intervention: Prevent Skin Injury  Recent Flowsheet Documentation  Taken 11/11/2021 1605 by Marcos Cortes RN  Body Position: position changed independently  Goal: Optimal Comfort and Wellbeing  11/11/2021 1645 by Marcos Cortes RN  Outcome: Ongoing, Progressing  11/11/2021 1641 by Marcos Cortes RN  Outcome: Ongoing, Progressing  Goal: Readiness for Transition of Care  11/11/2021 1645 by Marcos Cortes RN  Outcome: Ongoing, Progressing  11/11/2021 1641 by Marcos Cortes RN  Outcome: Ongoing, Progressing  Intervention: Mutually Develop Transition Plan  Recent Flowsheet Documentation  Taken 11/11/2021 1605 by Marcos Cortes RN  Transportation Anticipated: family or friend will provide  Patient/Family Anticipated Services at Transition: none  Patient/Family Anticipates Transition to: home  Taken 11/11/2021 1601 by Marcos Cortes RN  Equipment Currently Used at Home: none   Goal Outcome Evaluation:

## 2021-11-11 NOTE — TELEPHONE ENCOUNTER
Spoke with the patient's mother reaffirming that I did want her to be present for the patient's evaluation in 2 days.  She relates that he was seen in the emergency room approximately 2 weeks ago and was informed that his glucose levels were elevated.  He was started on Metformin 500 mg 1 tab p.o. twice daily since then his readings have been running 500 but over the past few days have been 2  she relates.  She relates he is very noncompliant of his medication not taking it as instructed and not checking his glucose levels daily.  I have emphasized to her the importance of his taking his medication and have asked her to call me his readings tomorrow for the past 3 to 4 days.  As the patient has not been seen since the emergency room visit 2 weeks ago and since he has been noncompliant I am concerned about him going into DKA.  We will ask him to be evaluated in the emergency room tonight.

## 2021-11-11 NOTE — ED NOTES
Pt to triage from home with c/o weakness, elevated blood sugars - states has been in the 4-500 range for the past few days.  Pt states weakness has been for about a month, but getting worse.  Pt is on medication, but can't remember the name.  Pt denies eating any processed sugar recently.  Pt wearing mask in triage. Triage personnel wore appropriate PPE       Viviana Sanchez RN  11/11/21 2307

## 2021-11-11 NOTE — PLAN OF CARE
Patient admitted with DKA, patient just DX with DM one month ago and started on metformin. Patient did indicate he has been very weak/tired for the past week. Patient mom at bedside. Will continue to monitor and report changes to MD      Problem: Adult Inpatient Plan of Care  Goal: Plan of Care Review  Outcome: Ongoing, Progressing  Goal: Patient-Specific Goal (Individualized)  Outcome: Ongoing, Progressing  Goal: Absence of Hospital-Acquired Illness or Injury  Outcome: Ongoing, Progressing  Intervention: Identify and Manage Fall Risk  Recent Flowsheet Documentation  Taken 11/11/2021 1605 by Marcos Cortes RN  Safety Promotion/Fall Prevention: safety round/check completed  Intervention: Prevent Skin Injury  Recent Flowsheet Documentation  Taken 11/11/2021 1605 by Marcos Cortes RN  Body Position: position changed independently  Goal: Optimal Comfort and Wellbeing  Outcome: Ongoing, Progressing  Goal: Readiness for Transition of Care  Outcome: Ongoing, Progressing  Intervention: Mutually Develop Transition Plan  Recent Flowsheet Documentation  Taken 11/11/2021 1605 by Marcos Cortes RN  Transportation Anticipated: family or friend will provide  Patient/Family Anticipated Services at Transition: none  Patient/Family Anticipates Transition to: home  Taken 11/11/2021 1601 by Marcos Cortes, RN  Equipment Currently Used at Home: none   Goal Outcome Evaluation:

## 2021-11-11 NOTE — H&P
[facility]    CC: high blood sugar, feeling unwell    HPI : Mr. García is a 22yo male with recently diagnosed DM, he was placed on metformin but mother reports his blood sugars have not been controlld on this and he has been very fatigued as well.  He has had some nausea and vomiting  And visual changes. He has had polydipsia and polyuria.  Symptoms have been pretty consistent, worsening, become severe today. No alleviating or exacerbating factors.  He was found to be in DKA and was started on inuslin gtt. He is hungry now and gap is closed.  Mother reports constipation as well.    Past Medical History:   Diagnosis Date   • ADHD    • Asthma    • Diabetes mellitus (HCC)    • Visual impairment     blind in right eye   TBI    Social History     Socioeconomic History   • Marital status: Single   Tobacco Use   • Smoking status: Former Smoker   • Smokeless tobacco: Never Used   Substance and Sexual Activity   • Alcohol use: No   • Drug use: Not Currently     Types: Marijuana   • Sexual activity: Yes     Family History   Problem Relation Age of Onset   • Other Mother    • Diabetes Father      MEDS: Reviewed as per chart  ALL: strawberry  ROS: 12 point negative except as in HPI    Vital Sign Min/Max for last 24 hours  Temp  Min: 97.3 °F (36.3 °C)  Max: 97.3 °F (36.3 °C)   BP  Min: 106/66  Max: 131/90   Pulse  Min: 58  Max: 82   Resp  Min: 12  Max: 18   SpO2  Min: 97 %  Max: 100 %   No data recorded   Weight  Min: 61.3 kg (135 lb 2.3 oz)  Max: 63.5 kg (140 lb)     GEN:   appears ill, , AxOx3  HEENT: PERRL, EOMI, no icterus, mmm, no jvd, trachea midline, neck supple  CHEST: CTA bilat, no wheezes, no crackles, no use of accessory muscles  CV: RRR, no m/g/r  ABD: soft, nt, nd +bs, no hepatosplenomegaly  EXT: no c/c/e  SKIN: no rashes, no xanthomas, nl turgor, warm, dry  LYMPH: no palpable cervical or supraclavicular lymphadenopathy  NEURO: CN 2-12 intact and symmetric bilaterally  PSYCH: nl affect, nl  orientation, nl judgement, nl mood  MSK: no kyphoscoliosis, 5/5 strength ue and le bilaterally    Labs: 11/11: reviewed:  glucsoe 192  Bun 10  Cr 1.09  Bicarb 18.8    A/P:  1. DKA -- resolved, will transition to lantus and ssi; needs diabetic nurse educator and to be set up with endocrinology  2. DM -- likely adult onset type I  3. Fatigue -- check tsh, cortisol  4. Diplopia -- check mri brain  5. TBI

## 2021-11-12 LAB
ANION GAP SERPL CALCULATED.3IONS-SCNC: 10.9 MMOL/L (ref 5–15)
BUN SERPL-MCNC: 11 MG/DL (ref 6–20)
BUN/CREAT SERPL: 12.1 (ref 7–25)
CALCIUM SPEC-SCNC: 8.9 MG/DL (ref 8.6–10.5)
CHLORIDE SERPL-SCNC: 113 MMOL/L (ref 98–107)
CO2 SERPL-SCNC: 20.1 MMOL/L (ref 22–29)
CREAT SERPL-MCNC: 0.91 MG/DL (ref 0.76–1.27)
GFR SERPL CREATININE-BSD FRML MDRD: 125 ML/MIN/1.73
GLUCOSE BLDC GLUCOMTR-MCNC: 248 MG/DL (ref 70–130)
GLUCOSE BLDC GLUCOMTR-MCNC: 288 MG/DL (ref 70–130)
GLUCOSE BLDC GLUCOMTR-MCNC: 336 MG/DL (ref 70–130)
GLUCOSE BLDC GLUCOMTR-MCNC: 76 MG/DL (ref 70–130)
GLUCOSE SERPL-MCNC: 79 MG/DL (ref 65–99)
MAGNESIUM SERPL-MCNC: 1.8 MG/DL (ref 1.6–2.6)
PHOSPHATE SERPL-MCNC: 1.4 MG/DL (ref 2.5–4.5)
POTASSIUM SERPL-SCNC: 3 MMOL/L (ref 3.5–5.2)
SODIUM SERPL-SCNC: 144 MMOL/L (ref 136–145)

## 2021-11-12 PROCEDURE — 84100 ASSAY OF PHOSPHORUS: CPT | Performed by: INTERNAL MEDICINE

## 2021-11-12 PROCEDURE — 82962 GLUCOSE BLOOD TEST: CPT

## 2021-11-12 PROCEDURE — 25010000002 ENOXAPARIN PER 10 MG: Performed by: INTERNAL MEDICINE

## 2021-11-12 PROCEDURE — 63710000001 INSULIN LISPRO (HUMAN) PER 5 UNITS: Performed by: INTERNAL MEDICINE

## 2021-11-12 PROCEDURE — 84132 ASSAY OF SERUM POTASSIUM: CPT | Performed by: INTERNAL MEDICINE

## 2021-11-12 PROCEDURE — 80048 BASIC METABOLIC PNL TOTAL CA: CPT | Performed by: INTERNAL MEDICINE

## 2021-11-12 PROCEDURE — 83735 ASSAY OF MAGNESIUM: CPT | Performed by: INTERNAL MEDICINE

## 2021-11-12 PROCEDURE — 63710000001 INSULIN GLARGINE PER 5 UNITS: Performed by: INTERNAL MEDICINE

## 2021-11-12 RX ORDER — AMOXICILLIN 250 MG
2 CAPSULE ORAL 2 TIMES DAILY
Status: DISCONTINUED | OUTPATIENT
Start: 2021-11-12 | End: 2021-11-15 | Stop reason: HOSPADM

## 2021-11-12 RX ORDER — ONDANSETRON 4 MG/1
4 TABLET, FILM COATED ORAL EVERY 6 HOURS PRN
Status: DISCONTINUED | OUTPATIENT
Start: 2021-11-12 | End: 2021-11-15 | Stop reason: HOSPADM

## 2021-11-12 RX ORDER — BISACODYL 10 MG
10 SUPPOSITORY, RECTAL RECTAL DAILY PRN
Status: DISCONTINUED | OUTPATIENT
Start: 2021-11-12 | End: 2021-11-15 | Stop reason: HOSPADM

## 2021-11-12 RX ORDER — INSULIN LISPRO 100 [IU]/ML
0-9 INJECTION, SOLUTION INTRAVENOUS; SUBCUTANEOUS
Status: DISCONTINUED | OUTPATIENT
Start: 2021-11-12 | End: 2021-11-14

## 2021-11-12 RX ORDER — ONDANSETRON 2 MG/ML
4 INJECTION INTRAMUSCULAR; INTRAVENOUS EVERY 6 HOURS PRN
Status: DISCONTINUED | OUTPATIENT
Start: 2021-11-12 | End: 2021-11-15 | Stop reason: HOSPADM

## 2021-11-12 RX ORDER — POLYETHYLENE GLYCOL 3350 17 G/17G
17 POWDER, FOR SOLUTION ORAL DAILY PRN
Status: DISCONTINUED | OUTPATIENT
Start: 2021-11-12 | End: 2021-11-15 | Stop reason: HOSPADM

## 2021-11-12 RX ORDER — ACETAMINOPHEN 325 MG/1
650 TABLET ORAL EVERY 4 HOURS PRN
Status: DISCONTINUED | OUTPATIENT
Start: 2021-11-12 | End: 2021-11-15 | Stop reason: HOSPADM

## 2021-11-12 RX ORDER — SODIUM CHLORIDE 0.9 % (FLUSH) 0.9 %
10 SYRINGE (ML) INJECTION AS NEEDED
Status: DISCONTINUED | OUTPATIENT
Start: 2021-11-12 | End: 2021-11-15 | Stop reason: HOSPADM

## 2021-11-12 RX ORDER — INSULIN LISPRO 100 [IU]/ML
0-9 INJECTION, SOLUTION INTRAVENOUS; SUBCUTANEOUS
Status: DISCONTINUED | OUTPATIENT
Start: 2021-11-12 | End: 2021-11-12

## 2021-11-12 RX ORDER — HEPARIN SODIUM 5000 [USP'U]/ML
5000 INJECTION, SOLUTION INTRAVENOUS; SUBCUTANEOUS EVERY 8 HOURS SCHEDULED
Status: DISCONTINUED | OUTPATIENT
Start: 2021-11-12 | End: 2021-11-12

## 2021-11-12 RX ORDER — BISACODYL 5 MG/1
5 TABLET, DELAYED RELEASE ORAL DAILY PRN
Status: DISCONTINUED | OUTPATIENT
Start: 2021-11-12 | End: 2021-11-15 | Stop reason: HOSPADM

## 2021-11-12 RX ORDER — ACETAMINOPHEN 650 MG/1
650 SUPPOSITORY RECTAL EVERY 4 HOURS PRN
Status: DISCONTINUED | OUTPATIENT
Start: 2021-11-12 | End: 2021-11-15 | Stop reason: HOSPADM

## 2021-11-12 RX ORDER — SODIUM CHLORIDE 0.9 % (FLUSH) 0.9 %
10 SYRINGE (ML) INJECTION EVERY 12 HOURS SCHEDULED
Status: DISCONTINUED | OUTPATIENT
Start: 2021-11-12 | End: 2021-11-15 | Stop reason: HOSPADM

## 2021-11-12 RX ADMIN — ENOXAPARIN SODIUM 40 MG: 40 INJECTION SUBCUTANEOUS at 08:52

## 2021-11-12 RX ADMIN — INSULIN LISPRO 7 UNITS: 100 INJECTION, SOLUTION INTRAVENOUS; SUBCUTANEOUS at 19:36

## 2021-11-12 RX ADMIN — DOCUSATE SODIUM 50MG AND SENNOSIDES 8.6MG 2 TABLET: 8.6; 5 TABLET, FILM COATED ORAL at 08:52

## 2021-11-12 RX ADMIN — Medication 2 PACKET: at 08:52

## 2021-11-12 RX ADMIN — SODIUM CHLORIDE, PRESERVATIVE FREE 10 ML: 5 INJECTION INTRAVENOUS at 08:53

## 2021-11-12 RX ADMIN — INSULIN GLARGINE 30 UNITS: 100 INJECTION, SOLUTION SUBCUTANEOUS at 21:20

## 2021-11-12 RX ADMIN — INSULIN LISPRO 4 UNITS: 100 INJECTION, SOLUTION INTRAVENOUS; SUBCUTANEOUS at 11:35

## 2021-11-12 RX ADMIN — SODIUM CHLORIDE, PRESERVATIVE FREE 10 ML: 5 INJECTION INTRAVENOUS at 21:15

## 2021-11-12 RX ADMIN — POTASSIUM CHLORIDE 40 MEQ: 750 TABLET, EXTENDED RELEASE ORAL at 06:49

## 2021-11-12 RX ADMIN — POTASSIUM CHLORIDE 40 MEQ: 750 TABLET, EXTENDED RELEASE ORAL at 13:00

## 2021-11-12 RX ADMIN — SODIUM CHLORIDE, PRESERVATIVE FREE 3 ML: 5 INJECTION INTRAVENOUS at 08:53

## 2021-11-12 RX ADMIN — SODIUM CHLORIDE, PRESERVATIVE FREE 10 ML: 5 INJECTION INTRAVENOUS at 08:54

## 2021-11-12 NOTE — PAYOR COMM NOTE
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  Forearm  1    Peripheral IV 11/11/21 0903 Left Antecubital 11/11/21  0903  Antecubital  1          Inactive LDAs     None                  Facility-Administered Medications as of 11/12/2021   Medication Dose Route Frequency Provider Last Rate Last Admin   • acetaminophen (TYLENOL) tablet 650 mg  650 mg Oral Q4H PRN Oscar Hernandez MD        Or   • acetaminophen (TYLENOL) 160 MG/5ML solution 650 mg  650 mg Oral Q4H PRN Oscar Hernandez MD        Or   • acetaminophen (TYLENOL) suppository 650 mg  650 mg Rectal Q4H PRN Oscar Hernandez MD       • acetaminophen (TYLENOL) tablet 650 mg  650 mg Oral Q4H PRN Jesús Brasher MD        Or   • acetaminophen (TYLENOL) suppository 650 mg  650 mg Rectal Q4H PRN Jesús Brasher MD       • sennosides-docusate (PERICOLACE) 8.6-50 MG per tablet 2 tablet  2 tablet Oral BID Jesús Brasher MD   2 tablet at 11/12/21 0852    And   • polyethylene glycol (MIRALAX) packet 17 g  17 g Oral Daily PRN Jesús Brasher MD        And   • bisacodyl (DULCOLAX) EC tablet 5 mg  5 mg Oral Daily PRN Jesús Brasher MD        And   • bisacodyl (DULCOLAX) suppository 10 mg  10 mg Rectal Daily PRN  Jesús Brasher MD       • calcium carbonate (TUMS) chewable tablet 500 mg (200 mg elemental)  2 tablet Oral BID PRN Oscar Hernandez MD       • calcium gluconate 1g/50ml 0.675% NaCl IV SOLN  1 g Intravenous PRN Oscar Hernandez MD        And   • calcium gluconate 6 g in sodium chloride 0.9 % 500 mL IVPB  6 g Intravenous PRN Oscar Hernandez MD       • dextrose (D50W) (25 g/50 mL) IV injection 25 g  25 g Intravenous Q15 Min PRN Oscar Hernandez MD       • dextrose (GLUTOSE) oral gel 15 g  15 g Oral Q15 Min PRN Oscar Hernandez MD       • enoxaparin (LOVENOX) syringe 40 mg  40 mg Subcutaneous Q24H Oscar Hernandez MD   40 mg at 11/12/21 0852   • glucagon (human recombinant) (GLUCAGEN DIAGNOSTIC) injection 1 mg  1 mg Subcutaneous Q15 Min PRN Oscar Hernandez MD       • HYDROcodone-acetaminophen (NORCO) 5-325 MG per tablet 1 tablet  1 tablet Oral Q4H PRN Oscra Hernandez MD       • insulin glargine (LANTUS, SEMGLEE) injection 30 Units  30 Units Subcutaneous Nightly Oscar Hernandez MD   30 Units at 11/11/21 1855   • insulin lispro (ADMELOG) injection 0-9 Units  0-9 Units Subcutaneous Q6H Oscar Hernandez MD   4 Units at 11/11/21 3793   • Magnesium Sulfate 2 gram Bolus, followed by 8 gram infusion (total Mg dose 10 grams)- Mg less than or equal to 1mg/dL  2 g Intravenous PRN Oscar Hernandez MD        Or   • Magnesium Sulfate 2 gram / 50mL Infusion (GIVE X 3 BAGS TO EQUAL 6GM TOTAL DOSE) - Mg 1.1 - 1.5 mg/dl  2 g Intravenous PROscar Lopez MD        Or   • Magnesium Sulfate 4 gram infusion- Mg 1.6-1.9 mg/dL  4 g Intravenous PRN Oscar Hernandez MD       • melatonin tablet 5 mg  5 mg Oral Nightly PRN Oscar Hernandez MD       • [COMPLETED] ondansetron (ZOFRAN) injection 4 mg  4 mg Intravenous Once Nani Powell PA   4 mg at 11/11/21 0647   • ondansetron (ZOFRAN) tablet 4 mg  4 mg Oral Q6H PRN Jesús Brasher MD        Or   • ondansetron (ZOFRAN) injection 4 mg  4 mg Intravenous Q6H PRN Jesús Brasher MD       •  potassium & sodium phosphates (PHOS-NAK) 280-160-250 MG packet - for Phosphorus less than 1.25 mg/dL  2 packet Oral Q6H PRN Oscar Hernandez MD        Or   • potassium & sodium phosphates (PHOS-NAK) 280-160-250 MG packet - for Phosphorus 1.25 - 2.5 mg/dL  2 packet Oral Q6H PRN Oscar Hernandez MD   2 packet at 11/12/21 0852   • potassium chloride (K-DUR,KLOR-CON) ER tablet 40 mEq  40 mEq Oral PRN Oscar Hernandez MD   40 mEq at 11/12/21 0649   • potassium chloride (KLOR-CON) packet 40 mEq  40 mEq Oral PRN Oscar Hernandez MD       • [COMPLETED] sodium chloride 0.9 % bolus 1,000 mL  1,000 mL Intravenous Once Nani Powell PA   Stopped at 11/11/21 0750   • [COMPLETED] sodium chloride 0.9 % bolus 1,000 mL  1,000 mL Intravenous Once Nani Powell PA   Stopped at 11/11/21 0841   • sodium chloride 0.9 % bolus 2,000 mL  2,000 mL Intravenous Once Jesús Brasher MD       • sodium chloride 0.9 % flush 10 mL  10 mL Intravenous Q12H Jesús Brasher MD   10 mL at 11/12/21 0854   • sodium chloride 0.9 % flush 10 mL  10 mL Intravenous PRN Jesús Brasher MD       • sodium chloride 0.9 % flush 10 mL  10 mL Intravenous Q12H Oscar Hernandez MD   10 mL at 11/12/21 0853   • sodium chloride 0.9 % flush 10 mL  10 mL Intravenous PRN Oscar Hernandez MD       • sodium chloride 0.9 % flush 3 mL  3 mL Intravenous Q12H Jesús Brasher MD   3 mL at 11/12/21 0853       Lab Results (last 24 hours)     Procedure Component Value Units Date/Time    Magnesium [[id number]]  (Normal) Collected: 11/12/21 0454    Specimen: Blood Updated: 11/12/21 0853     Magnesium 1.8 mg/dL     Phosphorus [[id number]]  (Abnormal) Collected: 11/12/21 0454    Specimen: Blood Updated: 11/12/21 0650     Phosphorus 1.4 mg/dL     Basic Metabolic Panel [[id number]]  (Abnormal) Collected: 11/12/21 0454    Specimen: Blood Updated: 11/12/21 0638     Glucose 79 mg/dL      BUN 11 mg/dL      Creatinine 0.91 mg/dL      Sodium 144 mmol/L      Potassium 3.0 mmol/L      Chloride  113 mmol/L      CO2 20.1 mmol/L      Calcium 8.9 mg/dL      eGFR  African Amer 125 mL/min/1.73      BUN/Creatinine Ratio 12.1     Anion Gap 10.9 mmol/L     Narrative:      GFR Normal >60  Chronic Kidney Disease <60  Kidney Failure <15      POC Glucose Once [[id number]]  (Normal) Collected: 11/12/21 0632    Specimen: Blood Updated: 11/12/21 0632     Glucose 76 mg/dL      Comment: Meter: [id number] : [id number] Gabehsusie Agudelo NA       POC Glucose Once [[id number]]  (Abnormal) Collected: 11/11/21 2348    Specimen: Blood Updated: 11/11/21 2349     Glucose 238 mg/dL      Comment: Meter: [id number] : [id number] Mikey Ferraro RN       TSH [[id number]]  (Normal) Collected: 11/11/21 1613    Specimen: Blood Updated: 11/11/21 2120     TSH 0.966 uIU/mL     Cortisol [[id number]] Collected: 11/11/21 1613    Specimen: Blood Updated: 11/11/21 2120     Cortisol 15.53 mcg/dL     Narrative:      Cortisol Reference Ranges:    Cortisol 6AM - 10AM Range: 6.02-18.40 mcg/dl  Cortisol 4PM - 8PM Range: 2.68-10.50 mcg/dl      Results may be falsely increased if patient taking Biotin.      POC Glucose Once [[id number]]  (Abnormal) Collected: 11/11/21 1837    Specimen: Blood Updated: 11/11/21 1838     Glucose 213 mg/dL      Comment: Meter: [id number] : [id number] Cori Mendoza NA       POC Glucose Once [[id number]]  (Abnormal) Collected: 11/11/21 1803    Specimen: Blood Updated: 11/11/21 1814     Glucose 207 mg/dL      Comment: Meter: [id number] : [id number] Conatser Kelly NA       Phosphorus [[id number]]  (Abnormal) Collected: 11/11/21 1613    Specimen: Blood Updated: 11/11/21 1731     Phosphorus 1.7 mg/dL     POC Glucose Once [[id number]]  (Abnormal) Collected: 11/11/21 1657    Specimen: Blood Updated: 11/11/21 1659     Glucose 184 mg/dL      Comment: Meter: [id number] : [id number] Italia Villalta RN       Basic Metabolic Panel [[id number]]  (Abnormal) Collected: 11/11/21 1613    Specimen: Blood Updated: 11/11/21 1646     Glucose  192 mg/dL      BUN 10 mg/dL      Creatinine 1.09 mg/dL      Sodium 142 mmol/L      Potassium 3.4 mmol/L      Chloride 115 mmol/L      CO2 18.8 mmol/L      Calcium 8.3 mg/dL      eGFR  African Amer 102 mL/min/1.73      BUN/Creatinine Ratio 9.2     Anion Gap 8.2 mmol/L     Narrative:      GFR Normal >60  Chronic Kidney Disease <60  Kidney Failure <15      Magnesium [[id number]]  (Normal) Collected: 11/11/21 1613    Specimen: Blood Updated: 11/11/21 1646     Magnesium 1.9 mg/dL     Blood Gas, Venous - [[id number]]  (Abnormal) Collected: 11/11/21 0758    Specimen: Venous Blood Updated: 11/11/21 1607     Site N/A     pH, Venous 7.148 pH Units      Comment: Meter: [id number] : [id number] Mc MelendrezCritical:Notify Dr DR RAMIREZ (11-Nov-21 08:00:04)Read back ok        pCO2, Venous 35.3 mm Hg      pO2, Venous 43.1 mm Hg      HCO3, Venous 12.2 mmol/L      Base Excess, Venous -15.7 mmol/L      O2 Saturation Calculated 65.2 %      Barometric Pressure for Blood Gas 748.1 mmHg      Modality Room Air     Rate 20 Breaths/minute     POC Glucose Once [[id number]]  (Abnormal) Collected: 11/11/21 1548    Specimen: Blood Updated: 11/11/21 1559     Glucose 220 mg/dL      Comment: Meter: [id number] : [id number] Cori CARDENAS       POC Glucose Once [[id number]]  (Abnormal) Collected: 11/11/21 1512    Specimen: Blood Updated: 11/11/21 1513     Glucose 218 mg/dL      Comment: Meter: [id number] : [id number] Yuri ROWLAND       Phosphorus [[id number]]  (Abnormal) Collected: 11/11/21 1416    Specimen: Blood Updated: 11/11/21 1509     Phosphorus 1.6 mg/dL     Basic Metabolic Panel [[id number]]  (Abnormal) Collected: 11/11/21 1416    Specimen: Blood Updated: 11/11/21 1458     Glucose 208 mg/dL      BUN 11 mg/dL      Creatinine 1.02 mg/dL      Sodium 142 mmol/L      Potassium 3.5 mmol/L      Chloride 115 mmol/L      CO2 17.4 mmol/L      Calcium 8.3 mg/dL      eGFR  African Amer 110 mL/min/1.73      BUN/Creatinine Ratio 10.8      "Anion Gap 9.6 mmol/L     Narrative:      GFR Normal >60  Chronic Kidney Disease <60  Kidney Failure <15      Magnesium [[id number]]  (Normal) Collected: 11/11/21 1416    Specimen: Blood Updated: 11/11/21 1452     Magnesium 1.8 mg/dL     POC Glucose Once [[id number]]  (Abnormal) Collected: 11/11/21 1412    Specimen: Blood Updated: 11/11/21 1413     Glucose 213 mg/dL      Comment: Meter: [id number] : [id number] ROWLAND       POC Glucose Once [[id number]]  (Abnormal) Collected: 11/11/21 1245    Specimen: Blood Updated: 11/11/21 1246     Glucose 193 mg/dL      Comment: Meter: [id number] : [id number] ROWLAND       POC Glucose Once [[id number]]  (Abnormal) Collected: 11/11/21 1135    Specimen: Blood Updated: 11/11/21 1138     Glucose 192 mg/dL      Comment: Meter: [id number] : [id number] Shelly Hamilton RN       POC Glucose Once [[id number]]  (Abnormal) Collected: 11/11/21 1012    Specimen: Blood Updated: 11/11/21 1014     Glucose 192 mg/dL      Comment: Meter: [id number] : [id number] Shelly Hamilton RN           Orders (last 72 hrs)      Start     Ordered    11/12/21 0900  sodium chloride 0.9 % flush 10 mL  Every 12 Hours Scheduled         11/12/21 0736 11/12/21 0900  sennosides-docusate (PERICOLACE) 8.6-50 MG per tablet 2 tablet  2 Times Daily        \"And\" Linked Group Details    11/12/21 0736    11/12/21 0900  enoxaparin (LOVENOX) syringe 40 mg  Every 24 Hours         11/11/21 1808    11/12/21 0855  Diet Regular; Consistent Carbohydrate, Lactose Restricted  Diet Effective Now         11/12/21 0855    11/12/21 0830  heparin (porcine) 5000 UNIT/ML injection 5,000 Units  Every 8 Hours Scheduled,   Status:  Discontinued         11/12/21 0736    11/12/21 0803  Magnesium  Once         11/12/21 0802    11/12/21 0800  Vital Signs Every Hour and Per Hospital Policy Based on Patient Condition  Every Hour       11/12/21 0736    11/12/21 0800  Intake and Output  Every Hour       11/12/21 0736    11/12/21 " 0738  Diet Regular; Consistent Carbohydrate  Diet Effective Now,   Status:  Canceled         11/12/21 0737    11/12/21 0737  Cardiac Monitoring  Continuous         11/12/21 0736    11/12/21 0737  Continuous Pulse Oximetry  Continuous,   Status:  Canceled         11/12/21 0736    11/12/21 0737  Height & Weight  Once         11/12/21 0736    11/12/21 0737  Daily Weights  Daily       11/12/21 0736 11/12/21 0737  Use Mobility Guidelines for Advancement of Activity  Continuous         11/12/21 0736    11/12/21 0737  Insert Peripheral IV  Once         11/12/21 0736    11/12/21 0737  Saline Lock & Maintain IV Access  Continuous,   Status:  Canceled         11/12/21 0736 11/12/21 0737  Saline Lock  Continuous        Comments: For standing ICU/CCU Protocol    11/12/21 0736 11/12/21 0737  Oxygen Therapy- Nasal Cannula; Titrate for SPO2: 90%, Greater Than or Equal To  Continuous        Comments: For unresponsiveness, acute dyspnea, cyanosis or suspected hypoxemia    11/12/21 0736    11/12/21 0737  Continuous Pulse Oximetry  Continuous        Comments: Unresponsiveness, Acute Dyspnea, Cyanosis, Hypoxemia    11/12/21 0736    11/12/21 0737  ACLS Protocol For Life Threatening Dysrhythmias (If Not DNR Order)  Continuous         11/12/21 0736 11/12/21 0737  Notify Provider if ACLS Protocol Activated  Once         11/12/21 0736 11/12/21 0737  Place Order to Consult Intensivist For Critical Care Management (If Patient Not Admitted to Cardiology for Primary Cardiology Condition)  Continuous        Comments: For standing ICU/CCU Protocol    11/12/21 0736    11/12/21 0737  Notify All Physicians When Patient is Transferred  Once        Comments: For standing ICU/CCU Protocol    11/12/21 0736    11/12/21 0737  NPO Diet  Diet Effective Now,   Status:  Canceled         11/12/21 0736 11/12/21 0736  Oral care for patients not on NPPV or intubated  Every Shift      Comments: Oral care for patients not on NPPV or intubated     "11/12/21 0736    11/12/21 0736  sodium chloride 0.9 % flush 10 mL  As Needed         11/12/21 0736    11/12/21 0736  acetaminophen (TYLENOL) tablet 650 mg  Every 4 Hours PRN        \"Or\" Linked Group Details    11/12/21 0736    11/12/21 0736  acetaminophen (TYLENOL) suppository 650 mg  Every 4 Hours PRN        \"Or\" Linked Group Details    11/12/21 0736    11/12/21 0736  polyethylene glycol (MIRALAX) packet 17 g  Daily PRN        \"And\" Linked Group Details    11/12/21 0736    11/12/21 0736  bisacodyl (DULCOLAX) EC tablet 5 mg  Daily PRN        \"And\" Linked Group Details    11/12/21 0736    11/12/21 0736  bisacodyl (DULCOLAX) suppository 10 mg  Daily PRN        \"And\" Linked Group Details    11/12/21 0736    11/12/21 0736  ondansetron (ZOFRAN) tablet 4 mg  Every 6 Hours PRN        \"Or\" Linked Group Details    11/12/21 0736    11/12/21 0736  ondansetron (ZOFRAN) injection 4 mg  Every 6 Hours PRN        \"Or\" Linked Group Details    11/12/21 0736    11/12/21 0735  Diet Regular; Consistent Carbohydrate  Diet Effective Now,   Status:  Canceled         11/12/21 0734    11/12/21 0633  POC Glucose Once  PROCEDURE ONCE         11/12/21 0632    11/12/21 0600  Basic Metabolic Panel  Morning Draw         11/12/21 0110    11/12/21 0600  Phosphorus  Morning Draw         11/12/21 0110    11/12/21 0000  POC Glucose Q6H  Every 6 Hours       11/11/21 1808    11/11/21 2350  POC Glucose Once  PROCEDURE ONCE         11/11/21 2348    11/11/21 2100  sodium chloride 0.9 % flush 10 mL  Every 12 Hours Scheduled         11/11/21 1808    11/11/21 2000  Vital Signs  Every 4 Hours      Comments: Per per hospital policy    11/11/21 1808 11/11/21 1951  Transfer Patient  Once         11/11/21 1950    11/11/21 1949  TSH  Once         11/11/21 1948 11/11/21 1949  Cortisol  Once         11/11/21 1948 11/11/21 1949  MRI Brain Without Contrast  1 Time Imaging         11/11/21 1948 11/11/21 1900  insulin glargine (LANTUS, SEMGLEE) injection 30 " "Units  Nightly         11/11/21 1805    11/11/21 1900  insulin lispro (ADMELOG) injection 0-9 Units  Every 6 Hours         11/11/21 1808    11/11/21 1839  POC Glucose Once  PROCEDURE ONCE         11/11/21 1837    11/11/21 1815  POC Glucose Once  PROCEDURE ONCE         11/11/21 1803    11/11/21 1809  Patient Currently On Electrolyte Replacement Protocol - Please Refer to MAR for Protocol Details  Misc Nursing Order (Specify)  Daily      Comments: Patient Currently On Electrolyte Replacement Protocol - Please Refer to MAR for Protocol Details    11/11/21 1808 11/11/21 1807  ondansetron (ZOFRAN) tablet 4 mg  Every 6 Hours PRN,   Status:  Discontinued        \"Or\" Linked Group Details    11/11/21 1808 11/11/21 1807  ondansetron (ZOFRAN) injection 4 mg  Every 6 Hours PRN,   Status:  Discontinued        \"Or\" Linked Group Details    11/11/21 1808 11/11/21 1807  melatonin tablet 5 mg  Nightly PRN         11/11/21 1808 11/11/21 1807  calcium gluconate 1g/50ml 0.675% NaCl IV SOLN  As Needed        \"And\" Linked Group Details    11/11/21 1808 11/11/21 1807  calcium gluconate 6 g in sodium chloride 0.9 % 500 mL IVPB  As Needed        Note to Pharmacy: Final concentration of bolus dose between 10 to 50 mg/mL.   \"And\" Linked Group Details    11/11/21 1808 11/11/21 1807  potassium & sodium phosphates (PHOS-NAK) 280-160-250 MG packet - for Phosphorus less than 1.25 mg/dL  Every 6 Hours PRN        \"Or\" Linked Group Details    11/11/21 1808 11/11/21 1807  potassium & sodium phosphates (PHOS-NAK) 280-160-250 MG packet - for Phosphorus 1.25 - 2.5 mg/dL  Every 6 Hours PRN        \"Or\" Linked Group Details    11/11/21 1808 11/11/21 1807  Magnesium Sulfate 2 gram Bolus, followed by 8 gram infusion (total Mg dose 10 grams)- Mg less than or equal to 1mg/dL  As Needed        \"Or\" Linked Group Details    11/11/21 1808 11/11/21 1807  Magnesium Sulfate 2 gram / 50mL Infusion (GIVE X 3 BAGS TO EQUAL 6GM TOTAL DOSE) - Mg " "1.1 - 1.5 mg/dl  As Needed        \"Or\" Linked Group Details    11/11/21 1808    11/11/21 1807  Magnesium Sulfate 4 gram infusion- Mg 1.6-1.9 mg/dL  As Needed        \"Or\" Linked Group Details    11/11/21 1808    11/11/21 1807  potassium chloride (K-DUR,KLOR-CON) ER tablet 40 mEq  As Needed         11/11/21 1808 11/11/21 1807  potassium chloride (KLOR-CON) packet 40 mEq  As Needed         11/11/21 1808    11/11/21 1807  Activity - Ad Abby  Until Discontinued         11/11/21 1808 11/11/21 1807  Diet Regular  Diet Effective Now,   Status:  Canceled         11/11/21 1808    11/11/21 1807  Intake & Output  Every Shift       11/11/21 1808 11/11/21 1807  Weigh patient  Once         11/11/21 1808 11/11/21 1807  Oxygen Therapy- Nasal Cannula; Titrate for SPO2: 90% - 95%  Continuous,   Status:  Canceled         11/11/21 1808    11/11/21 1807  Insert Peripheral IV  Once         11/11/21 1808    11/11/21 1807  Saline Lock & Maintain IV Access  Continuous,   Status:  Canceled         11/11/21 1808    11/11/21 1807  Follow [facility] Hypoglycemia Standing Orders For Blood Glucose Less Than 70 mg/dL  Until Discontinued        Comments: ALERT PATIENT - NOT NPO & CAN SAFELY SWALLOW  Administer 4 oz Fruit Juice OR 4 oz Regular Soda OR 8 oz Milk OR 15-30 grams (1 tube) of Glucose Gel.  Recheck Blood Glucose Approximately 15 Minutes After Ingestion, Repeat Treatment & Continue to Recheck Blood Sugar Approximately Every 15 Minutes Until Blood Glucose is 70 or Higher.  Once Blood Glucose is 70 or Higher & if It Will Be More Than 60 Minutes Until Next Meal, Provide Appropriate Snack (Including Carbohydrate Food) Based on Meal Plan Order. Give Meal Tray As Soon As Possible.    PATIENT HAS IV ACCESS - UNRESPONSIVE, NPO OR UNABLE TO SAFELY SWALLOW  Administer 25g (50ml) D50W IV Push.  Recheck Blood Glucose Approximately 15 Minutes After Administration, if Blood Glucose Remains Less Than 70, Repeat Treatment   Recheck Blood Glucose " "Approximately 15 Minutes After 2nd Administration, if Blood Glucose Remains Less Than 70 After 2nd Dose of D50W, Contact Provider for Further Treatment Orders & Consider Adding IVF With D5W for Maintenance    PATIENT WITHOUT IV ACCESS - UNRESPONSIVE, NPO OR UNABLE TO SAFELY SWALLOW  Administer 1mg Glucagon SQ & Establish IV Access.  Turn Patient on Side - Nausea / Vomiting May Occur.  Recheck Blood Glucose Approximately 15 Minutes After Administration.  If Blood Glucose Remains Less Than 70, Administer 25g D50W IV Push (50ml).  Recheck Blood Glucose Approximately 15 Minutes After Administration of D50W, if Blood Glucose Remains Less Than 70, Contact Provider for Further Treatment Orders & Consider Adding IVF With D5 for Maintenance    Document Event & Patient Response to Interventions in EMR, Document Medications on MAR  Notify Provider if Hypoglycemia Treatment Needed    11/11/21 1808    11/11/21 1807  Initiate Electrolyte Replacement Protocol  Until Discontinued         11/11/21 1808    11/11/21 1807  Code Status and Medical Interventions:  Continuous         11/11/21 1808    11/11/21 1806  HYDROcodone-acetaminophen (NORCO) 5-325 MG per tablet 1 tablet  Every 4 Hours PRN         11/11/21 1808    11/11/21 1806  acetaminophen (TYLENOL) tablet 650 mg  Every 4 Hours PRN        \"Or\" Linked Group Details    11/11/21 1808    11/11/21 1806  acetaminophen (TYLENOL) 160 MG/5ML solution 650 mg  Every 4 Hours PRN        \"Or\" Linked Group Details    11/11/21 1808    11/11/21 1806  acetaminophen (TYLENOL) suppository 650 mg  Every 4 Hours PRN        \"Or\" Linked Group Details    11/11/21 1808    11/11/21 1806  calcium carbonate (TUMS) chewable tablet 500 mg (200 mg elemental)  2 Times Daily PRN         11/11/21 1808    11/11/21 1806  dextrose (GLUTOSE) oral gel 15 g  Every 15 Minutes PRN         11/11/21 1808    11/11/21 1806  dextrose (D50W) (25 g/50 mL) IV injection 25 g  Every 15 Minutes PRN         11/11/21 1808    11/11/21 " 1806  glucagon (human recombinant) (GLUCAGEN DIAGNOSTIC) injection 1 mg  Every 15 Minutes PRN         11/11/21 1808    11/11/21 1806  sodium chloride 0.9 % flush 10 mL  As Needed         11/11/21 1808    11/11/21 1806  Diet Regular; Consistent Carbohydrate  Diet Effective Now,   Status:  Canceled         11/11/21 1806    11/11/21 1700  POC Glucose Once  PROCEDURE ONCE         11/11/21 1657    11/11/21 1600  POC Glucose Once  PROCEDURE ONCE         11/11/21 1548    11/11/21 1514  POC Glucose Once  PROCEDURE ONCE         11/11/21 1512    11/11/21 1414  POC Glucose Once  PROCEDURE ONCE         11/11/21 1412    11/11/21 1247  POC Glucose Once  PROCEDURE ONCE         11/11/21 1245    11/11/21 1200  Basic Metabolic Panel  Every 4 Hours,   Status:  Canceled       11/11/21 0804    11/11/21 1200  Magnesium  Every 4 Hours,   Status:  Canceled       11/11/21 0804    11/11/21 1200  Phosphorus  Every 4 Hours,   Status:  Canceled       11/11/21 0804    11/11/21 1138  POC Glucose Once  PROCEDURE ONCE         11/11/21 1135    11/11/21 1014  POC Glucose Once  PROCEDURE ONCE         11/11/21 1012    11/11/21 0904  Code Status and Medical Interventions:  Continuous,   Status:  Canceled         11/11/21 0904    11/11/21 0900  Vital Signs  Every Hour       11/11/21 0804    11/11/21 0900  Strict Intake & Output  Every Hour      Comments: While on insulin infusion.    11/11/21 0804    11/11/21 0900  sodium chloride 0.9 % flush 3 mL  Every 12 Hours Scheduled         11/11/21 0804    11/11/21 0900  POC Glucose Q1H  Every Hour,   Status:  Canceled       11/11/21 0804    11/11/21 0850  POC Glucose Once  PROCEDURE ONCE         11/11/21 0848    11/11/21 0835  Inpatient Admission  Once         11/11/21 0835    11/11/21 0824  Lactic Acid, Plasma  Once         11/11/21 0823    11/11/21 0821  Critical Care  Once        Comments: This order was created via procedure documentation    11/11/21 0820    11/11/21 0806  insulin regular 1 unit/mL in 0.9%  sodium chloride  Titrated,   Status:  Discontinued         11/11/21 0804 11/11/21 0806  sodium chloride 0.9 % bolus 2,000 mL  Once         11/11/21 0804 11/11/21 0805  Daily Weights  Daily       11/11/21 0804 11/11/21 0804  Pulmonology (on-call MD unless specified)  Once        Specialty:  Pulmonary Disease  Provider:  (Not yet assigned)    11/11/21 0804 11/11/21 0802  Blood Gas, Venous -  PROCEDURE ONCE         11/11/21 0758    11/11/21 0802  Continuous Pulse Oximetry  Continuous,   Status:  Canceled         11/11/21 0804 11/11/21 0802  PRIOR to START of Intravenous Insulin Infusion, Notify Provider if K+ is Less Than 3.3, for Extra Potassium Orders, if Indicated. Do Not Start Insulin Drip if K+ Less Than 3.3.  Per Order Details         11/11/21 0804 11/11/21 0802  Notify Provider - If INITIAL BG </= 200 mg / dL, Contact Provider for Subcutaneous Insulin Orders.  Once        Comments: If INITIAL BG </= 200 mg / dL, Contact Provider for Subcutaneous Insulin Orders.    11/11/21 0804 11/11/21 0802  Once DKA or HHS Meets Resolution Criteria, Call Provider for Transition Orders from Intravenous to SQ Insulin and IV Fluids.  Per Order Details        Comments: RESOLUTION of DKA: Blood glucose < 200 mg/dL,AND TWO of the following criteria: Serum Bicarbonate > 15, Venous pH > 7.3, or Calculated Anion Gap </= 12 mEq/l.  RESOLUTION of HHS is associated with: Normal osmolality (effective plasma osmolality < 305 mOsm/kg) and patient is mentally alert.    11/11/21 0804 11/11/21 0802  Do Not Discontinue Insulin Infusion for 1 Hour After First Dose of Long-Acting Subcutaneous Insulin.  Per Order Details         11/11/21 0804 11/11/21 0802  Initiate Hypoglycemia Standing Orders if BG is Less Than 70 mg/dL and Call Provider  Per Order Details         11/11/21 0804 11/11/21 0802  Formula for Corrected Serum Sodium: Corrected Na= (measured Sodium + [1.6 x ((Glucose - 100)/100)]  Per Order Details          11/11/21 0804 11/11/21 0802  Use a Dedicated Line for Insulin Infusion (If Possible).  May Use a Carrier Fluid of NS at KVO Rate if Insulin Rate is Insufficient to Maintain IV Patency.  Prime IV Line With Insulin Infusion  Continuous         11/11/21 0804 11/11/21 0802  Insert Peripheral IV x2  Once         11/11/21 0804 11/11/21 0802  Saline Lock & Maintain IV Access  Continuous,   Status:  Canceled         11/11/21 0804    11/11/21 0802  NPO Diet  Diet Effective Now,   Status:  Canceled         11/11/21 0804    11/11/21 0802  Osmolality, Serum  STAT         11/11/21 0804 11/11/21 0802  Hemoglobin A1c  STAT         11/11/21 0804 11/11/21 0802  When BG Remains in Desired Range for 4 Consecutive Hours, May Check BG Every 2 Hours  Continuous         11/11/21 0804 11/11/21 0802  If Clinical Condition Changes, Resume Hourly BG Testing  Continuous         11/11/21 0804 11/11/21 0802  RN To Order STAT Random Glucose Per Critical Value Policy  Continuous         11/11/21 0804 11/11/21 0802  Inpatient Nutrition Consult  Once        Provider:  (Not yet assigned)    11/11/21 0804 11/11/21 0802  Inpatient Diabetes Educator Consult  Once        Provider:  (Not yet assigned)    11/11/21 0804 11/11/21 0802  Use Instructions for DKA:  Per Order Details        Comments: Serum Glucose > 250 mg/dL, Arterial pH </=7.3, Bicarbonate </= 15 mEq/L, and at least Moderate Ketonuria or Ketonemia    11/11/21 0804 11/11/21 0802  Potassium  STAT         11/11/21 0804 11/11/21 0801  sodium chloride 0.9 % flush 10 mL  Once As Needed,   Status:  Discontinued         11/11/21 0804 11/11/21 0801  sodium chloride 0.9 % infusion  Continuous PRN,   Status:  Discontinued         11/11/21 0804 11/11/21 0801  sodium chloride 0.9 % infusion  Continuous PRN,   Status:  Discontinued         11/11/21 0804 11/11/21 0801  sodium chloride 0.9 % with KCl 20 mEq/L infusion  Continuous PRN,   Status:  Discontinued          11/11/21 0804    11/11/21 0801  sodium chloride 0.9 % with KCl 40 mEq/L infusion  Continuous PRN,   Status:  Discontinued         11/11/21 0804    11/11/21 0801  dextrose 5 % and sodium chloride 0.9 % infusion  Continuous PRN,   Status:  Discontinued         11/11/21 0804 11/11/21 0801  dextrose 5 % and sodium chloride 0.9 % with KCl 40 mEq/L infusion  Continuous PRN,   Status:  Discontinued         11/11/21 0804 11/11/21 0801  dextrose 5 % and sodium chloride 0.9 % with KCl 20 mEq/L infusion  Continuous PRN,   Status:  Discontinued         11/11/21 0804    11/11/21 0801  sodium chloride 0.45 % infusion  Continuous PRN,   Status:  Discontinued         11/11/21 0804 11/11/21 0801  sodium chloride 0.45 % with KCl 20 mEq/L infusion  Continuous PRN,   Status:  Discontinued         11/11/21 0804    11/11/21 0801  sodium chloride 0.45 % 1,000 mL with potassium chloride 40 mEq infusion  Continuous PRN,   Status:  Discontinued         11/11/21 0804    11/11/21 0801  dextrose 5 % and sodium chloride 0.45 % infusion  Continuous PRN,   Status:  Discontinued         11/11/21 0804 11/11/21 0801  dextrose 5 % and sodium chloride 0.45 % with KCl 20 mEq/L infusion  Continuous PRN,   Status:  Discontinued         11/11/21 0804 11/11/21 0801  dextrose 5 % and sodium chloride 0.45 % with KCl 40 mEq/L infusion  Continuous PRN,   Status:  Discontinued         11/11/21 0804    11/11/21 0801  dextrose (D50W) (25 g/50 mL) IV injection 12.5 g  As Needed,   Status:  Discontinued         11/11/21 0804    11/11/21 0801  sodium chloride 0.9 % flush 10 mL  As Needed,   Status:  Discontinued         11/11/21 0804 11/11/21 0749  sodium chloride 0.9 % bolus 1,000 mL  Once         11/11/21 0747    11/11/21 0725  COVID PRE-OP / PRE-PROCEDURE SCREENING ORDER (NO ISOLATION) - Swab, Nasopharynx  Once         11/11/21 0724    11/11/21 0725  [facility] SWAB IN TRANSPORT MEDIA 8-12 HR TAT - Swab,  Nasopharynx  PROCEDURE ONCE         11/11/21 0724    11/11/21 0724  Blood Gas, Venous -  Once         11/11/21 0723    11/11/21 0704  Urinalysis, Microscopic Only - Urine, Clean Catch  Once         11/11/21 0703    11/11/21 0652  POC Glucose Once  PROCEDURE ONCE         11/11/21 0649    11/11/21 0630  ondansetron (ZOFRAN) injection 4 mg  Once         11/11/21 0628    11/11/21 0625  sodium chloride 0.9 % bolus 1,000 mL  Once         11/11/21 0623    11/11/21 0613  POC Glucose Q1H  Every Hour       11/11/21 0612    11/11/21 0613  XR Chest 1 View  1 Time Imaging         11/11/21 0612    11/11/21 0612  Cardiac monitoring  Continuous,   Status:  Canceled         11/11/21 0612    11/11/21 0612  Continuous Pulse Oximetry  Continuous,   Status:  Canceled         11/11/21 0612    11/11/21 0612  Vital signs  Once         11/11/21 0612    11/11/21 0612  Insert Peripheral IV  Once         11/11/21 0612    11/11/21 0612  [place] Draw  Once         11/11/21 0612    11/11/21 0612  CBC & Differential  Once         11/11/21 0612    11/11/21 0612  Comprehensive Metabolic Panel  Once         11/11/21 0612    11/11/21 0612  Magnesium  Once         11/11/21 0612    11/11/21 0612  Phosphorus  Once         11/11/21 0612    11/11/21 0612  Ketone Bodies, Serum (Not performed at [place])  Once         11/11/21 0612    11/11/21 0612  Urinalysis With Microscopic If Indicated (No Culture) - Urine, Clean Catch  Once         11/11/21 0612    11/11/21 0612  Green Top (Gel)  PROCEDURE ONCE         11/11/21 0612    11/11/21 0612  Lavender Top  PROCEDURE ONCE         11/11/21 0612    11/11/21 0612  Gold Top - SST  PROCEDURE ONCE         11/11/21 0612    11/11/21 0612  Light Blue Top  PROCEDURE ONCE         11/11/21 0612    11/11/21 0612  CBC Auto Differential  PROCEDURE ONCE         11/11/21 0612    11/11/21 0612  Beta Hydroxybutyrate Quantitative  PROCEDURE ONCE         11/11/21 0612    11/11/21 0611  Oxygen Therapy- Nasal Cannula; 2 LPM; Titrate  "for SPO2: 92%, Greater Than or Equal To  Continuous PRN,   Status:  Canceled       11/11/21 0612    Unscheduled  ECG 12 Lead  As Needed      Comments: For ICU/CCU Protocol Orders.  Nurse to Release if Patient Experiences Acute Chest Pain or Dysrhythmias    11/12/21 0736    Unscheduled  Potassium  As Needed        Comments: Sudden Ventricular Dysrhythmias. Notify Physician.      11/12/21 0736    Unscheduled  Magnesium  As Needed        Comments: For ICU/CCU Protocol      11/12/21 0736    Unscheduled  Magnesium  As Needed       11/11/21 1808    Unscheduled  Potassium  As Needed       11/11/21 1808    Unscheduled  Calcium  As Needed      Comments: 6 hours after infusion complete     \"And\" Linked Group Details    11/11/21 1808    --  metFORMIN (GLUCOPHAGE) 500 MG tablet  2 Times Daily With Meals,   Status:  Discontinued         11/11/21 0707    --  metFORMIN (GLUCOPHAGE) 500 MG tablet  2 Times Daily With Meals         11/11/21 1557                                    Encounter Information    Encounter Information    Department Encounter #   11/11/2021  6:09 AM [facility] CORONARY CARE [id number]   Susan Burks, RN   Registered Nurse   Cardiology   Plan of Care   Signed   Date of Service:  11/12/21 0448   Creation Time:  11/12/21 0448              Signed                    Goal Outcome Evaluation:  Plan of Care Reviewed With: patient  Progress: improving  Outcome Summary: VSS. No c/o of cp/dizziness. No c/o nausea or double vision. Blood glucose corrected per SC insulin. Pt educated on why neccessary. [facility]. Will continue to monitor patients progress. Plans for MRI this am.                                     "

## 2021-11-12 NOTE — PROGRESS NOTES
"  Daily Progress Note.   [address]  11/12/2021    Patient:  Name:  Raquel Sparks  MRN:  [id number]  2000  21 y.o.  male         CC: dka    Interval History/ros:  Pt is awake alert oriented.  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  Tolerated diet no emesis no abd pain  No fever soa cough sputum       Physical Exam:  /70 (BP Location: Left arm, Patient Position: Sitting)   Pulse 58   Temp 97.2 °F (36.2 °C) (Oral)   Resp 14   Ht 175.3 cm (69.02\")   Wt 61.3 kg (135 lb 2.3 oz) Comment: not weighed by RD  SpO2 100%   BMI 19.95 kg/m²   Body mass index is 19.95 kg/m².    Intake/Output Summary (Last 24 hours) at 11/12/2021 1534  Last data filed at 11/12/2021 0900  Gross per 24 hour   Intake 2896 ml   Output 1150 ml   Net 1746 ml     General appearance: NAD, conversant   Eyes: anicteric sclerae, moist conjunctivae; no lid-lag;    HENT: Atraumatic; oropharynx clear with moist mucous membranes and no mucosal ulcerations; normal hard and soft palate  Neck: Trachea midline; FROM,    Lungs: CTA, with normal respiratory effort and no intercostal retractions  CV: RRR, no MRGs   Abdomen: Soft, non-tende   Extremities: No peripheral edema    Skin: wwp no diffuse rash  Psych: Appropriate affect, alert and oriented   Neuro moves all ext, cns 2-12 intact speech intact    Data Review:  Notable Labs:  Results from last 7 days   Lab Units 11/11/21  0640   WBC 10*3/mm3 4.61   HEMOGLOBIN g/dL 16.3   PLATELETS 10*3/mm3 230     Results from last 7 days   Lab Units 11/12/21  0454 11/11/21  1613 11/11/21  1416 11/11/21  0826 11/11/21  0640   SODIUM mmol/L 144 142 142  --  139   POTASSIUM mmol/L 3.0* 3.4* 3.5 4.1 3.7   CHLORIDE mmol/L 113* 115* 115*  --  107   CO2 mmol/L 20.1* 18.8* 17.4*  --  12.0*   BUN mg/dL 11 10 11  --  14   CREATININE mg/dL 0.91 1.09 1.02  --  1.31*   GLUCOSE mg/dL 79 192* 208*  --  317*   CALCIUM mg/dL 8.9 8.3* 8.3*  --  " 9.7   MAGNESIUM mg/dL 1.8 1.9 1.8  --  2.1   PHOSPHORUS mg/dL 1.4* 1.7* 1.6*  --  3.3   Estimated Creatinine Clearance: 109.5 mL/min (by C-G formula based on SCr of 0.91 mg/dL).    Results from last 7 days   Lab Units 11/11/21  0831 11/11/21  0640   AST (SGOT) U/L  --  13   ALT (SGPT) U/L  --  26   LACTATE mmol/L 0.8  --    PLATELETS 10*3/mm3  --  230             Imaging:  Reviewed chest images personally from past 3 days    Scheduled meds:    enoxaparin, 40 mg, Subcutaneous, Q24H  insulin glargine, 30 Units, Subcutaneous, Nightly  insulin lispro, 0-9 Units, Subcutaneous, 4x Daily With Meals & Nightly  senna-docusate sodium, 2 tablet, Oral, BID  sodium chloride, 2,000 mL, Intravenous, Once  sodium chloride, 10 mL, Intravenous, Q12H  sodium chloride, 10 mL, Intravenous, Q12H  sodium chloride, 3 mL, Intravenous, Q12H        ASSESSMENT  /  PLAN:  DKA  DM - adult onset type I?  Fatigue  Diplopia - mri pituitary?  TBI history      glargine  ssi  Dm educators  Wait mri  All issues new to me today.  Prior hospital course, labs and imaging reviewed.  D/wpt and his mother.        Stephan Hernandez MD  [facility]  11/12/21  15:34 EST

## 2021-11-12 NOTE — PROGRESS NOTES
Discharge Planning Assessment  [facility]     Patient Name: Raquel Sparks  MRN: [id number]  Today's Date: 11/12/2021    Admit Date: 11/11/2021     Discharge Needs Assessment     Row Name 11/12/21 155       Living Environment    Lives With parent(s)    Current Living Arrangements home/apartment/condo    Potentially Unsafe Housing Conditions unable to assess    Primary Care Provided by self    Provides Primary Care For no one    Family Caregiver if Needed parent(s)    Quality of Family Relationships supportive; helpful    Able to Return to Prior Arrangements yes       Resource/Environmental Concerns    Resource/Environmental Concerns none    Transportation Concerns car, none       Transition Planning    Patient/Family Anticipates Transition to home with family    Patient/Family Anticipated Services at Transition none    Transportation Anticipated car, drives self; family or friend will provide       Discharge Needs Assessment    Equipment Currently Used at Home glucometer    Concerns to be Addressed discharge planning    Anticipated Changes Related to Illness none    Equipment Needed After Discharge none               Discharge Plan     Row Name 11/12/21 3997       Plan    Plan Home no needs    Plan Comments CCP spoke to patient mother via telephone.  CCP role explained  Face sheet verified.  Pt PCP is Dr. Ralf Albrecht.  Pt emergency contact is his mother Kaylynn, [phone].  Pt lives in an apartment with his mother.  She assists him as needed.  Pt uses no DME  He is independent with ADL's.  He obtains his medications from [facility].  He has no past rehab or HH history  Plan is home  Pt has no needs CCP following              Continued Care and Services - Admitted Since 11/11/2021    Coordination has not been started for this encounter.          Demographic Summary    No documentation.                Functional Status    No documentation.                Psychosocial    No documentation.                 Abuse/Neglect    No documentation.                Legal    No documentation.                Substance Abuse    No documentation.                Patient Forms    No documentation.                   Nitza Coley RN

## 2021-11-12 NOTE — PLAN OF CARE
Goal Outcome Evaluation:  Plan of Care Reviewed With: patient        Progress: improving  Outcome Summary: VSS. No c/o of cp/dizziness. No c/o nausea or double vision. Blood glucose corrected per SC insulin. Pt educated on why neccessary. Awaiting transfer to [facility] floor when bed becomes availabe. Will continue to monitor patients progress. Plans for MRI this am.

## 2021-11-12 NOTE — CONSULTS
"Adult Nutrition  Assessment/PES    Patient Name:  Raquel Sparks  YOB: 2000  MRN: [id number]  Admit Date:  11/11/2021    Assessment Date:  11/12/2021    Comments:  Nutrition consult for diet education. Pt admitted for DKA, recently diagnosed with diabetes. Discussed diet and developed a meal plan. Discussed carbohydrate foods and portion control. Will remain available as questions arise.      Reason for Assessment     Row Name 11/12/21 0735          Reason for Assessment    Reason For Assessment diagnosis/disease state     Diagnosis --  DKA, recently diagnosed with DM, hx of TBI 20 years ago                  Anthropometrics     Row Name 11/12/21 0737          Anthropometrics    Height 175.3 cm (69.02\")     Weight 61.3 kg (135 lb 2.3 oz)  not weighed by RD            Ideal Body Weight (IBW)    Ideal Body Weight (IBW) (kg) 73.73     % Ideal Body Weight 83.14            Body Mass Index (BMI)    BMI (kg/m2) 19.99     BMI Assessment BMI 18.5-24.9: normal                Labs/Tests/Procedures/Meds     Row Name 11/12/21 0738          Labs/Procedures/Meds    Lab Results Reviewed reviewed     Lab Results Comments k, phos, glu, A1C 13.8            Diagnostic Tests/Procedures    Diagnostic Test/Procedure Reviewed reviewed            Medications    Pertinent Medications Reviewed reviewed     Pertinent Medications Comments lovenox, heparin, insulin, pericolace,                  Estimated/Assessed Needs     Row Name 11/12/21 0739 11/12/21 0737       Calculation Measurements    Weight Used For Calculations 61.3 kg (135 lb 2.3 oz) --    Height -- 175.3 cm (69.02\")       Estimated/Assessed Needs    Additional Documentation KCAL/KG (Group); Fluid Requirements (Group); Protein Requirements (Group) --       KCAL/KG    KCAL/KG 35 Kcal/Kg (kcal) --    35 Kcal/Kg (kcal) 2145.5 --       Protein Requirements    Weight Used For Protein Calculations 61.3 kg (135 lb 2.3 oz) --    Est Protein Requirement Amount (gms/kg) " 1.2 gm protein --    Estimated Protein Requirements (gms/day) 73.56 --       Fluid Requirements    Fluid Requirements (mL/day) 2145 --    RDA Method (mL) 2145 --               Nutrition Prescription Ordered     Row Name 11/12/21 0741          Nutrition Prescription PO    Current PO Diet Regular                       Problem/Interventions:   Problem 1     Row Name 11/12/21 0742          Nutrition Diagnoses Problem 1    Problem 1 Knowledge Deficit     Etiology (related to) Medical Diagnosis     Endocrine DM                      Intervention Goal     Row Name 11/12/21 0747          Intervention Goal    General Disease management/therapy; Reduce/improve symptoms; Maintain nutrition     PO Tolerate PO     Weight Maintain weight                Nutrition Intervention     Row Name 11/12/21 0748          Nutrition Intervention    RD/Tech Action Follow Tx progress; Care plan reviewd                  Education/Evaluation     Row Name 11/12/21 0765          Education    Education Will Instruct as appropriate; Provided education regarding     Provided education regarding Healthy eating for diabetes            Monitor/Evaluation    Monitor Per protocol                 Electronically signed by:  Kavita Pierce RD  11/12/21 10:19 EST

## 2021-11-12 NOTE — PLAN OF CARE
Goal Outcome Evaluation:  Plan of Care Reviewed With: patient, mother        Progress: improving  Outcome Summary: Pt A/Ox4, VSS on RA. Lungs clear. Abd soft, denies n/v. Tolerating PO diet. +void to bathroom, no BM this shift. Skin intact. OOB with standby assist, generalized weakness. Diabetes educator at bedside for teaching. Plan for MRI. Transferred to [facility], report called to Lexi. All belongings with patient's mother, pt safely transferred off unit.

## 2021-11-12 NOTE — CONSULTS
"Diabetes Education  Assessment/Teaching    Patient Name:  Raquel Sparks  YOB: 2000  MRN: [id number]  Admit Date:  11/11/2021      Assessment Date:  11/12/2021    Most Recent Value   General Information     Referral From: MD Steph order  [A1C 13.8%]   Height 175.3 cm (69.02\")   Height Method Stated   Weight 61.3 kg (135 lb 2.3 oz)  [not weighed by RD]   Weight Method Bed scale   Are you currently involved in an activity/exercise program?  Yes   Do you have high blood pressure? no   How would you rate your current health? excellent   Patient expressed need learn what to eat   Pregnancy Assessment    Diabetes History    Length of Diabetes Diagnosis Newly diagnosed <6 months   Current DM knowledge poor   Have you had diabetes education/teaching in the past? no   Do you test your blood sugar at home? yes   Frequency of checks 1-2 times day   Have you had low blood sugar? (<70mg/dl) yes   How often do you have low blood sugar? occasionally   Have you had high blood sugar? (>140mg/dl) yes   How often do you have high blood sugar? frequently   How do you feel about having diabetes? overwhelmed   Have You Felt Down, Depressed or Hopeless? no   Have You Felt Little Interest or Pleasure in Doing Things? no   What makes it difficult for you to take care of your diabetes or yourself? learning what to do   Education Preferences    What areas of diabetes would you like to learn about? avoiding high blood sugar,  medications for diabetes,  testing my blood sugar at home,  avoiding low blood sugar,  diet information,  understanding diabetes   Nutrition Information    Assessment Topics    Healthy Eating - Assessment Needs education   Being Active - Assessment Needs education   Taking Medication - Assessment Needs education   Problem Solving - Assessment Needs education   Reducing Risk - Assessment Needs education   Healthy Coping - Assessment Needs education   Monitoring - Assessment Needs education   DM Goals "    Taking Medication - Goal 0-7 days from discharge  [take insulin as ordered]   Monitoring - Goal 0-7 days from discharge  [test BG 3-4 times day]   Contact Plan Outpatient DM education referral,  Phone call,  30-90 days from discharge            Most Recent Value   DM Education Needs    Meter Has own   Meter Type Other (comment)   Frequency of Testing 4 times a day   Medication Insulin,  Oral,  Side effects,  Administration,  Vial,  Pen   Problem Solving Hypoglycemia,  Hyperglycemia,  Signs,  Symptoms,  Treatment   Healthy Eating Reviewed meal plan   Physical Activity Other (comment)  [basketball]   Physical Activity Frequency Regularly   Healthy Coping Appropriate   Discharge Plan Home   Teaching Method Explanation,  Discussion,  Demonstration,  Handouts,  Teach back   Patient Response Verbalized understanding,  Needs reinforcement,  Other (comment)  [mother and aunt at visit]            Other Comments:  Spoke with pt and his mom and aunt about his diabetes diagnosis. He states he was diagnosed with diabetes one month ago and that MD put him on metformin and that he has a meter and has been testing his BG couple times day. This hospital stay,the MD instructed pt that he was a type 1,which we talked about. We discussed healthy eating/activity.     We discussed basal/bolus insulin therapy and pt gave himself an injection of normal saline as his first time giving himself an injection.Pt did very well.  We discussed testing his BG 3-4 times day, and what the new technologies are with CGM devices.  We discussed hypoglycemia,signs and symptoms and how to treat and be prepared with BG tabs.Also discussed using an emergency kit for his family if they ever find pt unresponsive.  Discussed with pt his looking into going to a specialist for diabetes .    Contact information given to pt for further questions and written materials were given that covered what we discussed.  Discharge orders put in for best formulary coverage  for  insulin upon discharge.          Electronically signed by:  Fallon Rojas RN  11/12/21 15:16 EST

## 2021-11-13 ENCOUNTER — APPOINTMENT (OUTPATIENT)
Dept: MRI IMAGING | Facility: HOSPITAL | Age: 21
End: 2021-11-13

## 2021-11-13 LAB
ALBUMIN SERPL-MCNC: 3.4 G/DL (ref 3.5–5.2)
ANION GAP SERPL CALCULATED.3IONS-SCNC: 8.1 MMOL/L (ref 5–15)
BASOPHILS # BLD MANUAL: 0.06 10*3/MM3 (ref 0–0.2)
BASOPHILS NFR BLD AUTO: 1 % (ref 0–1.5)
BUN SERPL-MCNC: 10 MG/DL (ref 6–20)
BUN/CREAT SERPL: 11 (ref 7–25)
CALCIUM SPEC-SCNC: 8.4 MG/DL (ref 8.6–10.5)
CHLORIDE SERPL-SCNC: 110 MMOL/L (ref 98–107)
CO2 SERPL-SCNC: 24.9 MMOL/L (ref 22–29)
CREAT SERPL-MCNC: 0.91 MG/DL (ref 0.76–1.27)
DEPRECATED RDW RBC AUTO: 47.8 FL (ref 37–54)
EOSINOPHIL # BLD MANUAL: 0.17 10*3/MM3 (ref 0–0.4)
EOSINOPHIL NFR BLD MANUAL: 3 % (ref 0.3–6.2)
ERYTHROCYTE [DISTWIDTH] IN BLOOD BY AUTOMATED COUNT: 14.8 % (ref 12.3–15.4)
GFR SERPL CREATININE-BSD FRML MDRD: 125 ML/MIN/1.73
GLUCOSE BLDC GLUCOMTR-MCNC: 114 MG/DL (ref 70–130)
GLUCOSE BLDC GLUCOMTR-MCNC: 123 MG/DL (ref 70–130)
GLUCOSE BLDC GLUCOMTR-MCNC: 164 MG/DL (ref 70–130)
GLUCOSE BLDC GLUCOMTR-MCNC: 278 MG/DL (ref 70–130)
GLUCOSE SERPL-MCNC: 52 MG/DL (ref 65–99)
HCT VFR BLD AUTO: 38.9 % (ref 37.5–51)
HGB BLD-MCNC: 12.9 G/DL (ref 13–17.7)
LYMPHOCYTES # BLD MANUAL: 3.59 10*3/MM3 (ref 0.7–3.1)
LYMPHOCYTES NFR BLD MANUAL: 3 % (ref 5–12)
LYMPHOCYTES NFR BLD MANUAL: 61.6 % (ref 19.6–45.3)
MCH RBC QN AUTO: 29.5 PG (ref 26.6–33)
MCHC RBC AUTO-ENTMCNC: 33.2 G/DL (ref 31.5–35.7)
MCV RBC AUTO: 88.8 FL (ref 79–97)
MONOCYTES # BLD AUTO: 0.17 10*3/MM3 (ref 0.1–0.9)
NEUTROPHILS # BLD AUTO: 1.82 10*3/MM3 (ref 1.7–7)
NEUTROPHILS NFR BLD MANUAL: 31.3 % (ref 42.7–76)
PHOSPHATE SERPL-MCNC: 1.5 MG/DL (ref 2.5–4.5)
PHOSPHATE SERPL-MCNC: 2.3 MG/DL (ref 2.5–4.5)
PLAT MORPH BLD: NORMAL
PLATELET # BLD AUTO: 180 10*3/MM3 (ref 140–450)
PMV BLD AUTO: 11.4 FL (ref 6–12)
POTASSIUM SERPL-SCNC: 3.1 MMOL/L (ref 3.5–5.2)
POTASSIUM SERPL-SCNC: 3.3 MMOL/L (ref 3.5–5.2)
RBC # BLD AUTO: 4.38 10*6/MM3 (ref 4.14–5.8)
RBC MORPH BLD: NORMAL
SODIUM SERPL-SCNC: 143 MMOL/L (ref 136–145)
WBC # BLD AUTO: 5.82 10*3/MM3 (ref 3.4–10.8)
WBC MORPH BLD: NORMAL

## 2021-11-13 PROCEDURE — 80069 RENAL FUNCTION PANEL: CPT | Performed by: INTERNAL MEDICINE

## 2021-11-13 PROCEDURE — 85025 COMPLETE CBC W/AUTO DIFF WBC: CPT | Performed by: INTERNAL MEDICINE

## 2021-11-13 PROCEDURE — 25010000002 ENOXAPARIN PER 10 MG: Performed by: INTERNAL MEDICINE

## 2021-11-13 PROCEDURE — 63710000001 INSULIN GLARGINE PER 5 UNITS: Performed by: INTERNAL MEDICINE

## 2021-11-13 PROCEDURE — 82962 GLUCOSE BLOOD TEST: CPT

## 2021-11-13 PROCEDURE — 63710000001 INSULIN LISPRO (HUMAN) PER 5 UNITS: Performed by: INTERNAL MEDICINE

## 2021-11-13 PROCEDURE — 85007 BL SMEAR W/DIFF WBC COUNT: CPT | Performed by: INTERNAL MEDICINE

## 2021-11-13 PROCEDURE — 70551 MRI BRAIN STEM W/O DYE: CPT

## 2021-11-13 RX ORDER — INSULIN LISPRO 100 [IU]/ML
3 INJECTION, SOLUTION INTRAVENOUS; SUBCUTANEOUS
Status: DISCONTINUED | OUTPATIENT
Start: 2021-11-13 | End: 2021-11-15 | Stop reason: HOSPADM

## 2021-11-13 RX ORDER — INSULIN GLARGINE 100 [IU]/ML
20 INJECTION, SOLUTION SUBCUTANEOUS NIGHTLY
Status: DISCONTINUED | OUTPATIENT
Start: 2021-11-13 | End: 2021-11-14

## 2021-11-13 RX ORDER — SODIUM CHLORIDE 9 MG/ML
125 INJECTION, SOLUTION INTRAVENOUS CONTINUOUS
Status: ACTIVE | OUTPATIENT
Start: 2021-11-13 | End: 2021-11-14

## 2021-11-13 RX ADMIN — INSULIN LISPRO 2 UNITS: 100 INJECTION, SOLUTION INTRAVENOUS; SUBCUTANEOUS at 12:43

## 2021-11-13 RX ADMIN — POTASSIUM CHLORIDE 40 MEQ: 750 TABLET, EXTENDED RELEASE ORAL at 10:25

## 2021-11-13 RX ADMIN — ENOXAPARIN SODIUM 40 MG: 40 INJECTION SUBCUTANEOUS at 09:08

## 2021-11-13 RX ADMIN — POTASSIUM CHLORIDE 40 MEQ: 750 TABLET, EXTENDED RELEASE ORAL at 14:52

## 2021-11-13 RX ADMIN — SODIUM CHLORIDE 125 ML/HR: 9 INJECTION, SOLUTION INTRAVENOUS at 17:49

## 2021-11-13 RX ADMIN — Medication 2 PACKET: at 01:16

## 2021-11-13 RX ADMIN — POTASSIUM CHLORIDE 40 MEQ: 750 TABLET, EXTENDED RELEASE ORAL at 05:37

## 2021-11-13 RX ADMIN — INSULIN GLARGINE 20 UNITS: 100 INJECTION, SOLUTION SUBCUTANEOUS at 20:40

## 2021-11-13 RX ADMIN — SODIUM CHLORIDE, PRESERVATIVE FREE 10 ML: 5 INJECTION INTRAVENOUS at 20:03

## 2021-11-13 RX ADMIN — POTASSIUM CHLORIDE 40 MEQ: 750 TABLET, EXTENDED RELEASE ORAL at 01:17

## 2021-11-13 RX ADMIN — Medication 2 PACKET: at 20:34

## 2021-11-13 NOTE — PROGRESS NOTES
"  Daily Progress Note.   [facility] [address]  11/13/2021    Patient:  Name:  Raquel Sparks  MRN:  [id number]  2000  21 y.o.  male         CC: dka    Interval History/ros:  Pt is awake alert oriented.  Still feels very fatigued overall this morning had sugars a little bit hypoglycemic recovered well.  He is tolerating a diet still decreased exercise tolerance significantly less than his baseline.  No pleuritic chest pain.  Also still reports discoordinated eye movements     Physical Exam:  /63 (BP Location: Right arm, Patient Position: Sitting)   Pulse 77   Temp 97.5 °F (36.4 °C) (Oral)   Resp 18   Ht 175.3 cm (69.02\")   Wt 61.3 kg (135 lb 2.3 oz) Comment: not weighed by RD  SpO2 98%   BMI 19.95 kg/m²   Body mass index is 19.95 kg/m².    Intake/Output Summary (Last 24 hours) at 11/13/2021 1618  Last data filed at 11/13/2021 0855  Gross per 24 hour   Intake 250 ml   Output --   Net 250 ml     General appearance: NAD, conversant   Eyes: anicteric sclerae, moist conjunctivae; no lid-lag;    HENT: Atraumatic; oropharynx clear with moist mucous membranes and no mucosal ulcerations; normal hard and soft palate  Neck: Trachea midline; FROM,    Lungs: CTA, with normal respiratory effort and no intercostal retractions  CV: RRR, no MRGs   Abdomen: Soft, non-tender  Extremities: No peripheral edema    Skin: wwp no diffuse rash  Psych: Appropriate affect, alert and oriented   Neuro moves all ext, cns 2-12 intact speech intact    Data Review:  Notable Labs:  Results from last 7 days   Lab Units 11/13/21  0610 11/11/21  0640   WBC 10*3/mm3 5.82 4.61   HEMOGLOBIN g/dL 12.9* 16.3   PLATELETS 10*3/mm3 180 230     Results from last 7 days   Lab Units 11/13/21  0610 11/12/21  2305 11/12/21  0454 11/11/21  1613 11/11/21  1416 11/11/21  0826 11/11/21  0640   SODIUM mmol/L 143  --  144 142 142  --  139   POTASSIUM mmol/L 3.1* 3.3* 3.0* 3.4* 3.5 4.1 3.7   CHLORIDE mmol/L 110*  --  113* 115* 115*  --  " 107   CO2 mmol/L 24.9  --  20.1* 18.8* 17.4*  --  12.0*   BUN mg/dL 10  --  11 10 11  --  14   CREATININE mg/dL 0.91  --  0.91 1.09 1.02  --  1.31*   GLUCOSE mg/dL 52*  --  79 192* 208*  --  317*   CALCIUM mg/dL 8.4*  --  8.9 8.3* 8.3*  --  9.7   MAGNESIUM mg/dL  --   --  1.8 1.9 1.8  --  2.1   PHOSPHORUS mg/dL 2.3* 1.5* 1.4* 1.7* 1.6*  --  3.3   Estimated Creatinine Clearance: 109.5 mL/min (by C-G formula based on SCr of 0.91 mg/dL).    Results from last 7 days   Lab Units 11/13/21  0610 11/11/21  0831 11/11/21  0640   AST (SGOT) U/L  --   --  13   ALT (SGPT) U/L  --   --  26   LACTATE mmol/L  --  0.8  --    PLATELETS 10*3/mm3 180  --  230             Imaging:  Reviewed chest images personally from past 3 days    Scheduled meds:    enoxaparin, 40 mg, Subcutaneous, Q24H  insulin glargine, 30 Units, Subcutaneous, Nightly  insulin lispro, 0-9 Units, Subcutaneous, TID With Meals  senna-docusate sodium, 2 tablet, Oral, BID  sodium chloride, 2,000 mL, Intravenous, Once  sodium chloride, 10 mL, Intravenous, Q12H  sodium chloride, 10 mL, Intravenous, Q12H  sodium chloride, 3 mL, Intravenous, Q12H        ASSESSMENT  /  PLAN:  DKA  DM - adult onset type I?  Fatigue  Diplopia - mri pituitary?  TBI history    MRI still pending discoordinated eye movements-we will consult neurology for any evaluation of this  glargine decrease to 20 units given am low  Start meal time insulin with sliding scale as well  IV fluids    Dm educators  Wait mri    Still significantly weakened I will go ahead and order physical therapy for evaluation.  Hemoglobin A1c was 13.8 on arrival wonder if his normoglycemia makes him feel fatigued compared to his hyperglycemia that he is probably had for a while.       D/w pt and his mother.        Stephan Hernandez MD  [facility]  11/13/21  15:34 EST      Mri reports back  Neurosurgery consult - discoordinated eye movements, diplopia worsening with pituitary abnormality      Stephan Hernandez  MD [facility]  11/13/21  18:42 EST

## 2021-11-13 NOTE — PLAN OF CARE
Goal Outcome Evaluation:  Plan of Care Reviewed With: patient, mother        Progress: improving  Outcome Summary: No complaints of pain. Called MRI to check on order, should have MRI of brain done tomorrow. A&O, room air. VSS, will continue to monitor.

## 2021-11-13 NOTE — PLAN OF CARE
Goal Outcome Evaluation:           Progress: improving  Outcome Summary: No c/o pain. BG elevated all day today.  Pt giving own insulin injections. MRI hopefully tomorrow. VSS. Will continue to monitor.

## 2021-11-13 NOTE — PLAN OF CARE
Goal Outcome Evaluation:  Plan of Care Reviewed With: patient, mother        Progress: improving  Outcome Summary: Insulins adjusted. No c/o pain. Standby assist. likelsharon dc home tomorrow.

## 2021-11-14 PROBLEM — R93.89 ABNORMAL MRI: Status: ACTIVE | Noted: 2021-11-14

## 2021-11-14 PROBLEM — D64.9 ANEMIA: Status: ACTIVE | Noted: 2021-11-14

## 2021-11-14 PROBLEM — E83.42 HYPOMAGNESEMIA: Status: ACTIVE | Noted: 2021-11-14

## 2021-11-14 LAB
ALBUMIN SERPL-MCNC: 2.8 G/DL (ref 3.5–5.2)
ANION GAP SERPL CALCULATED.3IONS-SCNC: 7.4 MMOL/L (ref 5–15)
BASOPHILS # BLD MANUAL: 0.04 10*3/MM3 (ref 0–0.2)
BASOPHILS NFR BLD AUTO: 1 % (ref 0–1.5)
BUN SERPL-MCNC: 10 MG/DL (ref 6–20)
BUN/CREAT SERPL: 11.9 (ref 7–25)
CALCIUM SPEC-SCNC: 7.9 MG/DL (ref 8.6–10.5)
CHLORIDE SERPL-SCNC: 108 MMOL/L (ref 98–107)
CO2 SERPL-SCNC: 24.6 MMOL/L (ref 22–29)
CREAT SERPL-MCNC: 0.84 MG/DL (ref 0.76–1.27)
D DIMER PPP FEU-MCNC: 1.33 MCGFEU/ML (ref 0–0.49)
DEPRECATED RDW RBC AUTO: 42.9 FL (ref 37–54)
EOSINOPHIL # BLD MANUAL: 0.17 10*3/MM3 (ref 0–0.4)
EOSINOPHIL NFR BLD MANUAL: 4 % (ref 0.3–6.2)
ERYTHROCYTE [DISTWIDTH] IN BLOOD BY AUTOMATED COUNT: 13.9 % (ref 12.3–15.4)
GFR SERPL CREATININE-BSD FRML MDRD: 137 ML/MIN/1.73
GLUCOSE BLDC GLUCOMTR-MCNC: 232 MG/DL (ref 70–130)
GLUCOSE BLDC GLUCOMTR-MCNC: 234 MG/DL (ref 70–130)
GLUCOSE BLDC GLUCOMTR-MCNC: 252 MG/DL (ref 70–130)
GLUCOSE BLDC GLUCOMTR-MCNC: 313 MG/DL (ref 70–130)
GLUCOSE BLDC GLUCOMTR-MCNC: 340 MG/DL (ref 70–130)
GLUCOSE BLDC GLUCOMTR-MCNC: 62 MG/DL (ref 70–130)
GLUCOSE BLDC GLUCOMTR-MCNC: 75 MG/DL (ref 70–130)
GLUCOSE BLDC GLUCOMTR-MCNC: 81 MG/DL (ref 70–130)
GLUCOSE BLDC GLUCOMTR-MCNC: 93 MG/DL (ref 70–130)
GLUCOSE SERPL-MCNC: 227 MG/DL (ref 65–99)
HCT VFR BLD AUTO: 33.6 % (ref 37.5–51)
HGB BLD-MCNC: 11.9 G/DL (ref 13–17.7)
LYMPHOCYTES # BLD MANUAL: 2.58 10*3/MM3 (ref 0.7–3.1)
LYMPHOCYTES NFR BLD MANUAL: 62 % (ref 19.6–45.3)
LYMPHOCYTES NFR BLD MANUAL: 8 % (ref 5–12)
MAGNESIUM SERPL-MCNC: 1.5 MG/DL (ref 1.6–2.6)
MCH RBC QN AUTO: 30.1 PG (ref 26.6–33)
MCHC RBC AUTO-ENTMCNC: 35.4 G/DL (ref 31.5–35.7)
MCV RBC AUTO: 84.8 FL (ref 79–97)
MONOCYTES # BLD AUTO: 0.33 10*3/MM3 (ref 0.1–0.9)
NEUTROPHILS # BLD AUTO: 1.04 10*3/MM3 (ref 1.7–7)
NEUTROPHILS NFR BLD MANUAL: 25 % (ref 42.7–76)
PHOSPHATE SERPL-MCNC: 3.4 MG/DL (ref 2.5–4.5)
PLAT MORPH BLD: NORMAL
PLATELET # BLD AUTO: 160 10*3/MM3 (ref 140–450)
PMV BLD AUTO: 11.7 FL (ref 6–12)
POTASSIUM SERPL-SCNC: 3.5 MMOL/L (ref 3.5–5.2)
RBC # BLD AUTO: 3.96 10*6/MM3 (ref 4.14–5.8)
RBC MORPH BLD: NORMAL
SODIUM SERPL-SCNC: 140 MMOL/L (ref 136–145)
T3FREE SERPL-MCNC: 2.43 PG/ML (ref 2–4.4)
T4 FREE SERPL-MCNC: 1.36 NG/DL (ref 0.93–1.7)
WBC # BLD AUTO: 4.16 10*3/MM3 (ref 3.4–10.8)
WBC MORPH BLD: NORMAL

## 2021-11-14 PROCEDURE — 99222 1ST HOSP IP/OBS MODERATE 55: CPT | Performed by: STUDENT IN AN ORGANIZED HEALTH CARE EDUCATION/TRAINING PROGRAM

## 2021-11-14 PROCEDURE — 84481 FREE ASSAY (FT-3): CPT | Performed by: INTERNAL MEDICINE

## 2021-11-14 PROCEDURE — 84439 ASSAY OF FREE THYROXINE: CPT | Performed by: INTERNAL MEDICINE

## 2021-11-14 PROCEDURE — 80069 RENAL FUNCTION PANEL: CPT | Performed by: INTERNAL MEDICINE

## 2021-11-14 PROCEDURE — 25010000002 MAGNESIUM SULFATE 2 GM/50ML SOLUTION: Performed by: INTERNAL MEDICINE

## 2021-11-14 PROCEDURE — 82962 GLUCOSE BLOOD TEST: CPT

## 2021-11-14 PROCEDURE — 85379 FIBRIN DEGRADATION QUANT: CPT | Performed by: INTERNAL MEDICINE

## 2021-11-14 PROCEDURE — 25010000002 ENOXAPARIN PER 10 MG: Performed by: INTERNAL MEDICINE

## 2021-11-14 PROCEDURE — 63710000001 INSULIN GLARGINE PER 5 UNITS: Performed by: INTERNAL MEDICINE

## 2021-11-14 PROCEDURE — 83735 ASSAY OF MAGNESIUM: CPT | Performed by: INTERNAL MEDICINE

## 2021-11-14 PROCEDURE — 97161 PT EVAL LOW COMPLEX 20 MIN: CPT

## 2021-11-14 PROCEDURE — 25010000002 DIPHENHYDRAMINE PER 50 MG: Performed by: INTERNAL MEDICINE

## 2021-11-14 PROCEDURE — 99253 IP/OBS CNSLTJ NEW/EST LOW 45: CPT | Performed by: NEUROLOGICAL SURGERY

## 2021-11-14 PROCEDURE — 85007 BL SMEAR W/DIFF WBC COUNT: CPT | Performed by: INTERNAL MEDICINE

## 2021-11-14 PROCEDURE — 63710000001 INSULIN LISPRO (HUMAN) PER 5 UNITS: Performed by: INTERNAL MEDICINE

## 2021-11-14 PROCEDURE — 63710000001 INSULIN LISPRO (HUMAN) PER 5 UNITS: Performed by: NURSE PRACTITIONER

## 2021-11-14 PROCEDURE — 97116 GAIT TRAINING THERAPY: CPT

## 2021-11-14 PROCEDURE — 85025 COMPLETE CBC W/AUTO DIFF WBC: CPT | Performed by: INTERNAL MEDICINE

## 2021-11-14 RX ORDER — DIPHENHYDRAMINE HYDROCHLORIDE 50 MG/ML
25 INJECTION INTRAMUSCULAR; INTRAVENOUS EVERY 6 HOURS PRN
Status: DISCONTINUED | OUTPATIENT
Start: 2021-11-14 | End: 2021-11-15 | Stop reason: HOSPADM

## 2021-11-14 RX ORDER — INSULIN LISPRO 100 [IU]/ML
0-7 INJECTION, SOLUTION INTRAVENOUS; SUBCUTANEOUS
Status: DISCONTINUED | OUTPATIENT
Start: 2021-11-14 | End: 2021-11-15 | Stop reason: HOSPADM

## 2021-11-14 RX ORDER — INSULIN GLARGINE 100 [IU]/ML
10 INJECTION, SOLUTION SUBCUTANEOUS NIGHTLY
Status: DISCONTINUED | OUTPATIENT
Start: 2021-11-14 | End: 2021-11-15

## 2021-11-14 RX ADMIN — INSULIN LISPRO 3 UNITS: 100 INJECTION, SOLUTION INTRAVENOUS; SUBCUTANEOUS at 17:19

## 2021-11-14 RX ADMIN — SODIUM CHLORIDE 125 ML/HR: 9 INJECTION, SOLUTION INTRAVENOUS at 03:17

## 2021-11-14 RX ADMIN — INSULIN LISPRO 6 UNITS: 100 INJECTION, SOLUTION INTRAVENOUS; SUBCUTANEOUS at 08:43

## 2021-11-14 RX ADMIN — INSULIN LISPRO 5 UNITS: 100 INJECTION, SOLUTION INTRAVENOUS; SUBCUTANEOUS at 17:18

## 2021-11-14 RX ADMIN — SODIUM CHLORIDE, PRESERVATIVE FREE 10 ML: 5 INJECTION INTRAVENOUS at 08:45

## 2021-11-14 RX ADMIN — DIPHENHYDRAMINE HYDROCHLORIDE 25 MG: 50 INJECTION, SOLUTION INTRAMUSCULAR; INTRAVENOUS at 21:15

## 2021-11-14 RX ADMIN — MAGNESIUM SULFATE HEPTAHYDRATE 2 G: 40 INJECTION, SOLUTION INTRAVENOUS at 12:31

## 2021-11-14 RX ADMIN — ENOXAPARIN SODIUM 40 MG: 40 INJECTION SUBCUTANEOUS at 08:44

## 2021-11-14 RX ADMIN — INSULIN LISPRO 3 UNITS: 100 INJECTION, SOLUTION INTRAVENOUS; SUBCUTANEOUS at 08:45

## 2021-11-14 RX ADMIN — INSULIN GLARGINE 10 UNITS: 100 INJECTION, SOLUTION SUBCUTANEOUS at 21:07

## 2021-11-14 NOTE — CONSULTS
"Neurology Consult Note    Consult Date: 11/14/2021    Referring MD: Jesús Brasher MD    Reason for Consult I have been asked to see the patient in neurological consultation to render advice and opinion regarding disconjugate gaze    Raquel Sparks is a 21 y.o.  male with past medical history of pituitary cystic adenoma, recently diagnosed diabetes mellitus on October 2021, ADHD, asthma who presented to the hospital for generalized weakness and elevated blood sugar.  Patient had a recent Covid infection in September/2021.  On October he felt generalized weakness, increased thirst and urination then presented to outside hospital and his blood sugar was found to be high and started on Metformin since that time he was not feeling well complaining of generalized weakness, blurry vision.  On this admission he presented with blood sugar of 317 with anion gap of 20, venous blood gas 7.14 and elevated ketone bodies and started on DKA protocol.  Neurology consulted for the disconjugate gaze.    Past Medical History:   Diagnosis Date   • ADHD    • Asthma    • Diabetes mellitus (HCC)    • Visual impairment     blind in right eye       ROS:  Positive for fatigue and generalized weakness  Positive for polyuria, polydipsia  Positive for weight gain, no sweating  Positive for nausea and vomiting improved, no diarrhea  No fevers, chills  No weakness, numbness  No chest pain or palpitation    All other systems reviewed and they are negative    Exam  BP 96/56 (BP Location: Right arm, Patient Position: Lying)   Pulse 62   Temp 97.5 °F (36.4 °C) (Oral)   Resp 16   Ht 175.3 cm (69.02\")   Wt 69.6 kg (153 lb 7 oz)   SpO2 99%   BMI 22.65 kg/m²   Gen: NAD, vitals reviewed  MS: oriented x3, recent/remote memory intact, normal attention/concentration, language intact, no neglect.  CN: visual acuity grossly normal, no visual field cut, pupils 3 mm on the right, 2 mm on the left both reactive to the light, " "disconjugate gaze with right esotropia patient states he does have decreased vision on the right eye at baseline, no facial droop, no dysarthria  Motor: 5/5 throughout upper and lower extremities, normal tone  Sensory exam: Normal to light touch throughout  Abnormal movements: Not seen  Reflexes: 2+ throughout with downgoing plantars bilaterally  Coordination: Normal finger-nose-finger and heel-to-shin bilaterally  Gait: Not assessed    DATA:    Lab Results   Component Value Date    GLUCOSE 52 (L) 11/13/2021    CALCIUM 8.4 (L) 11/13/2021     11/13/2021    K 3.1 (L) 11/13/2021    CO2 24.9 11/13/2021     (H) 11/13/2021    BUN 10 11/13/2021    CREATININE 0.91 11/13/2021    EGFRIFAFRI 125 11/13/2021    EGFRIFNONA 76 09/17/2020    BCR 11.0 11/13/2021    ANIONGAP 8.1 11/13/2021     Lab Results   Component Value Date    WBC 5.82 11/13/2021    HGB 12.9 (L) 11/13/2021    HCT 38.9 11/13/2021    MCV 88.8 11/13/2021     11/13/2021       Lab review: Glucose 327 on presentation currently 52, sodium 143, potassium 3.1, BUN 10, creatinine 0.91, WBC 5.8    Imaging review: Personally reviewed his MRI brain without contrast which showed a cystic mass at the pituitary gland measuring 10 mm in diameter. It shows no change in size from the  previous MRI scan of 01/06/2021.    Diagnoses:  Pituitary cystic mass likely cystic adenoma  Disconjugate gaze  New onset type 1 diabetes mellitus  Diabetic ketoacidosis improved  Rule out hypercortisolism as the reason for his diabetes        PLAN:   1.  Recommend endocrine consult work-up for the adenoma  2.  Recommend neurosurgery consult and follow-up on the pituitary adenoma  3.  We will order MRI brain with and without contrast pituitary protocol and follow on the results.  4.  Will defer hypercortisolism work-up to endocrine if they feel necessary.      We will continue to follow and advise.      Plan discussed with the patient,mother nursing staff.    \"Dictated utilizing " "Dragon dictation\".      "

## 2021-11-14 NOTE — CONSULTS
Patient Name:  Raquel Sparks  YOB: 2000  Patient Care Team:  Ralf Albrecht MD as PCP - General (Internal Medicine)    Date of Admission:  11/11/2021  Date of Consult:  11/14/2021    Inpatient Internal Medicine Consult  Consult performed by: Dontrell Soriano APRN  Consult ordered by: Stephan Hernandez MD  Reason for consult: Evaluate status and make recommendations regarding treatment for type I diabetes mellitus.        Subjective   History of Present Illness  Mr. Sparks is a 21 y.o. male that has been admitted to [facility] due to DKA.  He has been admitted by Stephan Hernandez MD and and we were asked to see and assist with his medical problems, specifically relating to his type 1 diabetes mellitus management and anemia.    Patient recently started on Metformin prior to hospital admission; however, patient's mother noted blood sugars uncontrolled and increased fatigued; therefore, patient admitted through [facility] ER for DKA with glucose 317 and started on insulin drip.  Pulmonologist requests transfer of care to [facility] for remainder of hospitalization for management of type I diabetes mellitus.    Recommendations pending hospital course.  See additional details on assessment.      Past Medical History:   Diagnosis Date   • ADHD    • Asthma    • Diabetes mellitus (HCC)    • Visual impairment     blind in right eye     Past Surgical History:   Procedure Laterality Date   • CIRCUMCISION     • EYE SURGERY       Family History   Problem Relation Age of Onset   • Other Mother    • Diabetes Father      Social History     Tobacco Use   • Smoking status: Former Smoker   • Smokeless tobacco: Never Used   Substance Use Topics   • Alcohol use: No   • Drug use: Not Currently     Types: Marijuana     Medications Prior to Admission   Medication Sig Dispense Refill Last Dose   • metFORMIN (GLUCOPHAGE) 500 MG tablet Take 500 mg by mouth 2 (Two) Times a Day With Meals.      •  acetaminophen (TYLENOL) 500 MG tablet Take 500 mg by mouth.      • ibuprofen (ADVIL,MOTRIN) 400 MG tablet Take 400 mg by mouth.        Allergies:  Strawberry and Strawberry extract    Review of Systems   Constitutional: Negative for chills and fever.   HENT: Negative for congestion and rhinorrhea.    Eyes: Positive for visual disturbance.   Respiratory: Negative for cough and shortness of breath.    Cardiovascular: Negative for chest pain and leg swelling.   Gastrointestinal: Negative for abdominal pain, constipation, diarrhea and vomiting.   Endocrine: Negative for polydipsia, polyphagia and polyuria.   Genitourinary: Negative for difficulty urinating and dysuria.   Musculoskeletal: Negative for back pain and myalgias.   Skin: Negative for rash and wound.   Neurological: Positive for weakness (generalized). Negative for dizziness.   Psychiatric/Behavioral: Negative for confusion and hallucinations.       Objective      Vital Signs  Temp:  [97.1 °F (36.2 °C)-98.5 °F (36.9 °C)] 98.5 °F (36.9 °C)  Heart Rate:  [60-80] 80  Resp:  [16-18] 16  BP: ()/(56-76) 127/76  Body mass index is 22.65 kg/m².    Physical Exam  Constitutional:       General: He is not in acute distress.     Appearance: He is not toxic-appearing.      Comments: Generally weak   HENT:      Head: Normocephalic and atraumatic.   Eyes:      Extraocular Movements: Extraocular movements intact.      Conjunctiva/sclera: Conjunctivae normal.   Cardiovascular:      Rate and Rhythm: Normal rate.      Heart sounds: Normal heart sounds.   Pulmonary:      Effort: Pulmonary effort is normal.      Breath sounds: Normal breath sounds.   Abdominal:      General: Bowel sounds are normal.      Palpations: Abdomen is soft.   Musculoskeletal:         General: No tenderness.      Cervical back: Normal range of motion and neck supple.      Right lower leg: No edema.      Left lower leg: No edema.   Skin:     General: Skin is warm and dry.   Neurological:      Mental  Status: He is alert and oriented to person, place, and time.      Cranial Nerves: No cranial nerve deficit.   Psychiatric:         Behavior: Behavior normal.         Thought Content: Thought content normal.         Results Review:   I reviewed the patient's new clinical results.  I reviewed the patient's new imaging results and agree with the interpretation.  I reviewed the patient's other test results and agree with the interpretation  I personally viewed and interpreted the patient's EKG/Telemetry data         Active Hospital Problems    Diagnosis  POA   • **Diabetic ketoacidosis without coma associated with type 1 diabetes mellitus (HCC) [E10.10]  Yes   • Hypomagnesemia [E83.42]  Clinically Undetermined   • Anemia [D64.9]  Yes   • Abnormal MRI [R93.89]  Yes         Diabetic ketoacidosis without coma associated with type 1 diabetes mellitus (HCC)  A1c 13.8 (11/11) increased from 5.7 (10/2020)  Glucose rather labile yet noted Lantus recently decreased from 20 units nightly to 10 units nightly  Plan to continue long-acting & schedule lispro 3 units with meals & decrease moderate dose sliding scale to low dose TID with meals  Tolerating P.O.     Abnormal MRI  Neurology following MRI brain / pituitary protocol & request LAISHA to advise further      Hypomagnesemia  Replete per hospital protocol  Labs in a.m.      Anemia  Serum Hgb trending down without overt signs of bleeding  Suspect hemodilution given continuous IVFS  Ordering iron profile, ferritin, vitamin B12, folate in a.m.    Thank you very much for asking [facility] to be involved in this patient's care. We will follow along with you.      MONE Herron  [facility]  11/14/21  16:41 EST

## 2021-11-14 NOTE — CONSULTS
[facility]   Consult Note    Patient Name: Raquel Sparks  : 2000  MRN: [id number]  Primary Care Physician:  Ralf Albrecht MD  Referring Physician: Jesús Brasher MD  Date of admission: 2021    Consults  Subjective   Subjective     Reason for Consult/ Chief Complaint: Blurring of the vision of the left eye with an abnormal MRI finding    We were asked to see this patient because of an abnormal brain MRI finding.  He was admitted for treatment of DKA.  He has been blind in the right eye since about age 1.  He noticed recently there was some blurring in his left eye that he was concerned about that and he mentioned it.  Apparently he had an MRI done earlier this year ordered by Dr. Subramanian his primary care physician.  There was some T2 flair abnormalities in the region of the periaqueductal gray.  The radiologist brought up the possibility of neuromyelitis optica.  In any event another MRI was done just upon admission that described this structures being cystic and seemingly arising out of the left side of the pituitary region.  This was interpreted by another radiologist.  He has good extraocular movements and I cannot detect any visual loss in his left eye.  A MRI of the pituitary gland with and without contrast has been ordered.  He was with his mother and I told him we would touch base with him after that is done.  I am doubtful that this is necessarily related to his symptoms which are probably explained by his DKA.    Raquel Sparks is a 21 y.o. male see above    Review of Systems   Constitutional: Negative.    HENT: Negative.    Eyes:        Blurring of vision in left eye   Respiratory: Negative.    Cardiovascular: Negative.    Gastrointestinal: Negative.    Endocrine: Negative.    Genitourinary: Negative.    Musculoskeletal: Negative.    Skin: Negative.    Allergic/Immunologic: Negative.    Neurological: Negative.    Hematological: Negative.    Psychiatric/Behavioral:  Negative.    All other systems reviewed and are negative.       Personal History     Past Medical History:   Diagnosis Date   • ADHD    • Asthma    • Diabetes mellitus (HCC)    • Visual impairment     blind in right eye       Past Surgical History:   Procedure Laterality Date   • CIRCUMCISION     • EYE SURGERY         Family History: family history includes Diabetes in his father; Other in his mother. Otherwise pertinent FHx was reviewed and not pertinent to current issue.    Social History:  reports that he has quit smoking. He has never used smokeless tobacco. He reports previous drug use. Drug: Marijuana. He reports that he does not drink alcohol.    Home Medications:   acetaminophen, ibuprofen, and metFORMIN    Allergies:  Allergies   Allergen Reactions   • Strawberry Angioedema   • Strawberry Extract Hives       Objective    Objective     Vitals:  Temp:  [97.1 °F (36.2 °C)-98.5 °F (36.9 °C)] 98.5 °F (36.9 °C)  Heart Rate:  [60-80] 80  Resp:  [16-18] 16  BP: ()/(56-76) 127/76    Physical Exam  Constitutional:       Appearance: He is normal weight.   HENT:      Head: Normocephalic.      Right Ear: External ear normal.      Left Ear: External ear normal.      Nose: Nose normal.      Mouth/Throat:      Mouth: Mucous membranes are moist.   Eyes:      Extraocular Movements: Extraocular movements intact.      Comments: Blind in right eye   Cardiovascular:      Rate and Rhythm: Normal rate.      Pulses: Normal pulses.   Pulmonary:      Effort: Pulmonary effort is normal.   Abdominal:      General: Abdomen is flat.   Musculoskeletal:         General: Normal range of motion.      Cervical back: Normal range of motion.   Skin:     General: Skin is warm.      Capillary Refill: Capillary refill takes less than 2 seconds.   Neurological:      General: No focal deficit present.      Mental Status: He is alert.   Psychiatric:         Mood and Affect: Mood normal.         Result Review    Result Review:  I have personally  reviewed the results from the time of this admission to 11/14/2021 17:27 EST and agree with these findings:  []  Laboratory  []  Microbiology  [x]  Radiology  []  EKG/Telemetry   []  Cardiology/Vascular   []  Pathology  []  Old records  []  Other:MR SCAN OF THE BRAIN WITHOUT CONTRAST     HISTORY: Hyperglycemia. Blurred vision.     The MR scan was performed with sagittal and axial images without  contrast and compared to previous MRI scan dated 01/06/2021. The  ventricles are normal in size and midline. Some previous T2 FLAIR  hyperintensity in the periaqueductal gray matter noted on 01/06/2021 is  less apparent on today's exam and remains nonspecific. There is no  evidence of intracranial hemorrhage or mass effect and the diffusion  sequence appears normal.     There are normal flow voids in the major vessels. There is a 7 x 10 mm  cystic structure abutting the left margin of the pituitary gland and the  medial margin of the cavernous carotid artery. It appears to be  displacing the cavernous carotid artery laterally. It does not abut the  optic chiasm but could potentially relate to the patient's symptoms of  visual changes.     CONCLUSION: Cystic structure abutting the left margin of the pituitary  gland and causing slight displacement of the cavernous carotid artery  laterally and measuring 7 x 10 mm. It shows no change in size from the  previous MRI scan of 01/06/2021. The previous scan was performed with  intravenous contrast and shows no abnormal enhancement. The findings  suggest a cystic lesion that may relate to the pituitary gland. The  pituitary gland is otherwise normal in appearance. Further evaluation  with pituitary protocol MRI may be helpful.     This report was finalized on 11/13/2021 4:53 PM by Dr. Pito Perez M.D.     Lab Results   Component Value Date    GLUCOSE 227 (H) 11/14/2021    BUN 10 11/14/2021    CREATININE 0.84 11/14/2021    EGFRIFNONA 76 09/17/2020    EGFRIFAFRI 137 11/14/2021     BCR 11.9 11/14/2021    K 3.5 11/14/2021    CO2 24.6 11/14/2021    CALCIUM 7.9 (L) 11/14/2021    PROTENTOTREF 7.2 09/17/2020    ALBUMIN 2.80 (L) 11/14/2021    LABIL2 2.0 09/17/2020    AST 13 11/11/2021    ALT 26 11/11/2021     WBC   Date Value Ref Range Status   11/14/2021 4.16 3.40 - 10.80 10*3/mm3 Final     RBC   Date Value Ref Range Status   11/14/2021 3.96 (L) 4.14 - 5.80 10*6/mm3 Final     Hemoglobin   Date Value Ref Range Status   11/14/2021 11.9 (L) 13.0 - 17.7 g/dL Final     Hematocrit   Date Value Ref Range Status   11/14/2021 33.6 (L) 37.5 - 51.0 % Final     MCV   Date Value Ref Range Status   11/14/2021 84.8 79.0 - 97.0 fL Final     MCH   Date Value Ref Range Status   11/14/2021 30.1 26.6 - 33.0 pg Final     MCHC   Date Value Ref Range Status   11/14/2021 35.4 31.5 - 35.7 g/dL Final     RDW   Date Value Ref Range Status   11/14/2021 13.9 12.3 - 15.4 % Final     RDW-SD   Date Value Ref Range Status   11/14/2021 42.9 37.0 - 54.0 fl Final     MPV   Date Value Ref Range Status   11/14/2021 11.7 6.0 - 12.0 fL Final     Platelets   Date Value Ref Range Status   11/14/2021 160 140 - 450 10*3/mm3 Final     Neutrophils Absolute   Date Value Ref Range Status   11/14/2021 1.04 (L) 1.70 - 7.00 10*3/mm3 Final     Eosinophils Absolute   Date Value Ref Range Status   11/14/2021 0.17 0.00 - 0.40 10*3/mm3 Final     Basophils Absolute   Date Value Ref Range Status   11/14/2021 0.04 0.00 - 0.20 10*3/mm3 Final       Most notable findings include:      Assessment/Plan   Assessment / Plan     Brief Patient Summary:  Raquel Sparks is a 21 y.o. male who [facility] with a hospital with DKA but had some complaints of left eye blurring.  A MRI showed a cystic lesion arising on the left side the pituitary.  I am somewhat doubtful that this is related to his symptoms which probably are related to his DKA but we will review the MRI of the pituitary with and without contrast once it is done.    Active Hospital Problems:  Active  Hospital Problems    Diagnosis    • **Diabetic ketoacidosis without coma associated with type 1 diabetes mellitus (HCC)    • Hypomagnesemia    • Anemia    • Abnormal MRI      Plan: See above      Electronically signed by Won Ford MD, 11/14/21, 5:27 PM EST.

## 2021-11-14 NOTE — PLAN OF CARE
Goal Outcome Evaluation:  Plan of Care Reviewed With: patient, parent        Progress: no change  Outcome Summary: Insulin dosage adjusted. No c/o pain. Awaiting MRI and nuerosurgery to see, will continue to monitor.

## 2021-11-14 NOTE — PLAN OF CARE
Goal Outcome Evaluation:  Plan of Care Reviewed With: patient, parent           Outcome Summary: Pt evaluated for PT needs following admission for diabetic ketoacidosis. He reports ind PLOF w/ no AD and lives with his parents who are able to assist him if needed. At present he is ind for bed mobility and transfers and standbyA for ambulation up to 450ft w/o AD and no complaint of increased fatigue. He was also able to perform STS for 3 sets of 5 for LE strengthening though did become quite fatigued afterward. Sugar was checked after exercise and found to be at 280, though pt showed no signs of distress. RN was alerted of high blood sugar level, and pt and mother asked to monitor symptoms. Pt appears to be most limited by uncontrolled blood sugar at this time but PT will follow to help prevent loss of strength while hospitilized. Will cont to progress as tolerated.    Patient was intermittently wearing a face mask during this therapy encounter. Therapist used appropriate personal protective equipment including eye protection, mask, and gloves.  Mask used was standard procedure mask. Appropriate PPE was worn during the entire therapy session. Hand hygiene was completed before and after therapy session. Patient is not in enhanced droplet precautions.
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
## 2021-11-14 NOTE — PROGRESS NOTES
"  Daily Progress Note.   [address]  11/14/2021    Patient:  Name:  Raquel Sparks  MRN:  [id number]  2000  21 y.o.  male         CC: dka    Interval History/ros:  Pt is awake alert oriented.  +extreme fatigue with minimal exertion  +hypoglycemia this am resolved with po intake   +discoordinated eye movements     Physical Exam:  BP 96/56 (BP Location: Right arm, Patient Position: Lying)   Pulse 62   Temp 97.5 °F (36.4 °C) (Oral)   Resp 16   Ht 175.3 cm (69.02\")   Wt 69.6 kg (153 lb 7 oz)   SpO2 99%   BMI 22.65 kg/m²   Body mass index is 22.65 kg/m².    Intake/Output Summary (Last 24 hours) at 11/14/2021 0947  Last data filed at 11/14/2021 0317  Gross per 24 hour   Intake 918.75 ml   Output --   Net 918.75 ml     General appearance: NAD, conversant   Eyes: anicteric sclerae, moist conjunctivae; no lid-lag;    HENT: Atraumatic; oropharynx clear with moist mucous membranes and no mucosal ulcerations; normal hard and soft palate  Neck: Trachea midline; FROM,    Lungs: CTA, with normal respiratory effort and no intercostal retractions  CV: RRR, no MRGs   Abdomen: Soft, non-tender  Extremities: No peripheral edema    Skin: wwp no diffuse rash  Psych: Appropriate affect, alert and oriented   Neuro moves all ext, cns 2-12 intact speech intact    Data Review:  Notable Labs:  Results from last 7 days   Lab Units 11/13/21  0610 11/11/21  0640   WBC 10*3/mm3 5.82 4.61   HEMOGLOBIN g/dL 12.9* 16.3   PLATELETS 10*3/mm3 180 230     Results from last 7 days   Lab Units 11/14/21  0839 11/13/21  0610 11/12/21  2305 11/12/21  0454 11/11/21  1613 11/11/21  1416 11/11/21  0826 11/11/21  0640   SODIUM mmol/L  --  143  --  144 142 142  --  139   POTASSIUM mmol/L  --  3.1* 3.3* 3.0* 3.4* 3.5 4.1 3.7   CHLORIDE mmol/L  --  110*  --  113* 115* 115*  --  107   CO2 mmol/L  --  24.9  --  20.1* 18.8* 17.4*  --  12.0*   BUN mg/dL  --  10  --  11 10 11  --  14   CREATININE mg/dL  --  0.91  --  0.91 1.09 " 1.02  --  1.31*   GLUCOSE mg/dL  --  52*  --  79 192* 208*  --  317*   CALCIUM mg/dL  --  8.4*  --  8.9 8.3* 8.3*  --  9.7   MAGNESIUM mg/dL 1.5*  --   --  1.8 1.9 1.8  --  2.1   PHOSPHORUS mg/dL  --  2.3* 1.5* 1.4* 1.7* 1.6*  --  3.3   Estimated Creatinine Clearance: 124.3 mL/min (by C-G formula based on SCr of 0.91 mg/dL).    Results from last 7 days   Lab Units 11/13/21  0610 11/11/21  0831 11/11/21  0640   AST (SGOT) U/L  --   --  13   ALT (SGPT) U/L  --   --  26   LACTATE mmol/L  --  0.8  --    PLATELETS 10*3/mm3 180  --  230             Imaging:  Reviewed chest images personally from past 3 days    Scheduled meds:    enoxaparin, 40 mg, Subcutaneous, Q24H  insulin glargine, 10 Units, Subcutaneous, Nightly  insulin lispro, 0-9 Units, Subcutaneous, TID With Meals  insulin lispro, 3 Units, Subcutaneous, TID With Meals  senna-docusate sodium, 2 tablet, Oral, BID  sodium chloride, 2,000 mL, Intravenous, Once  sodium chloride, 10 mL, Intravenous, Q12H  sodium chloride, 10 mL, Intravenous, Q12H  sodium chloride, 3 mL, Intravenous, Q12H        ASSESSMENT  /  PLAN:  DKA resolved  DM - adult onset type I?  Fatigue  Diplopia   TBI history  Pituitary Abnormality ?adenoma with diplopia and blurried vision new    Neurosurgery and neurology consult for pituitary abn with disconjugate eye movement and blurred vision with new onset dm?pit adenoma as causative agent?  glargine decrease to 5 units at night given am low - mother wishes to try glargine in AM, seems reasonable, will order  Low dose this pm and then see where sugars are in am to dose glargine in am.  Cont meal time insulin with sliding scale as well    Will consult [facility] for DM mgmt now that patient out of icu and will need ongoing fine tuning of his DM mgmt as well as pituitary work up and plan prior to dc, has proven challenging with high daytime readings but low am readings.    IV fluids to stop tonight    Dm educators     Still significantly weakened I will go  ahead and order physical therapy for evaluation.  Hemoglobin A1c was 13.8 on arrival wonder if his normoglycemia makes him feel fatigued compared to his hyperglycemia that he is probably had for a while.---->PT consult placed d dimer sent will send free t3 t4       D/w pt and his mother.        Stephan Hernandez MD  [facility]  11/14/21  949am

## 2021-11-14 NOTE — PLAN OF CARE
Goal Outcome Evaluation:  Plan of Care Reviewed With: patient        Progress: no change  Outcome Summary: Pt still feels weak and c/o blurry vision. BG is difficult to control, was over 200 last night, dropped to 60 in am, didnt feel well. Oral glucose and a snack given, went up to over 200 again. Neurology and neurosurgery consults today.

## 2021-11-15 ENCOUNTER — READMISSION MANAGEMENT (OUTPATIENT)
Dept: CALL CENTER | Facility: HOSPITAL | Age: 21
End: 2021-11-15

## 2021-11-15 ENCOUNTER — APPOINTMENT (OUTPATIENT)
Dept: MRI IMAGING | Facility: HOSPITAL | Age: 21
End: 2021-11-15

## 2021-11-15 VITALS
RESPIRATION RATE: 16 BRPM | OXYGEN SATURATION: 99 % | WEIGHT: 160.5 LBS | HEIGHT: 69 IN | DIASTOLIC BLOOD PRESSURE: 65 MMHG | BODY MASS INDEX: 23.77 KG/M2 | TEMPERATURE: 96.6 F | SYSTOLIC BLOOD PRESSURE: 102 MMHG | HEART RATE: 57 BPM

## 2021-11-15 PROBLEM — F12.10 CANNABIS ABUSE: Status: ACTIVE | Noted: 2017-07-03

## 2021-11-15 PROBLEM — F51.3 SLEEP WALKING DISORDER: Status: ACTIVE | Noted: 2017-02-03

## 2021-11-15 PROBLEM — E05.90 SUBCLINICAL HYPERTHYROIDISM: Status: ACTIVE | Noted: 2017-05-12

## 2021-11-15 PROBLEM — G47.00 INSOMNIA: Status: ACTIVE | Noted: 2017-02-03

## 2021-11-15 PROBLEM — J45.20 MILD INTERMITTENT ASTHMA: Status: ACTIVE | Noted: 2018-01-05

## 2021-11-15 PROBLEM — Z72.821 INADEQUATE SLEEP HYGIENE: Status: ACTIVE | Noted: 2017-02-03

## 2021-11-15 PROBLEM — F31.81 BIPOLAR II DISORDER (HCC): Status: ACTIVE | Noted: 2018-01-12

## 2021-11-15 PROBLEM — H54.40 BLINDNESS OF RIGHT EYE WITH NORMAL VISION IN CONTRALATERAL EYE: Status: ACTIVE | Noted: 2017-09-07

## 2021-11-15 PROBLEM — S62.113A FRACTURE OF TRIQUETRAL BONE OF WRIST: Status: ACTIVE | Noted: 2018-04-16

## 2021-11-15 PROBLEM — J30.9 ALLERGIC RHINITIS: Status: ACTIVE | Noted: 2018-04-16

## 2021-11-15 PROBLEM — G47.54: Status: ACTIVE | Noted: 2017-02-03

## 2021-11-15 LAB
ALBUMIN SERPL-MCNC: 2.9 G/DL (ref 3.5–5.2)
ANION GAP SERPL CALCULATED.3IONS-SCNC: 7.3 MMOL/L (ref 5–15)
BUN SERPL-MCNC: 13 MG/DL (ref 6–20)
BUN/CREAT SERPL: 12.7 (ref 7–25)
CALCIUM SPEC-SCNC: 7.9 MG/DL (ref 8.6–10.5)
CHLORIDE SERPL-SCNC: 105 MMOL/L (ref 98–107)
CO2 SERPL-SCNC: 26.7 MMOL/L (ref 22–29)
CORTIS SERPL-MCNC: 3.17 MCG/DL
CREAT SERPL-MCNC: 1.02 MG/DL (ref 0.76–1.27)
DEPRECATED RDW RBC AUTO: 45 FL (ref 37–54)
EOSINOPHIL # BLD MANUAL: 0.13 10*3/MM3 (ref 0–0.4)
EOSINOPHIL NFR BLD MANUAL: 3.3 % (ref 0.3–6.2)
ERYTHROCYTE [DISTWIDTH] IN BLOOD BY AUTOMATED COUNT: 14.1 % (ref 12.3–15.4)
FSH SERPL-ACNC: 0.81 MIU/ML
GFR SERPL CREATININE-BSD FRML MDRD: 110 ML/MIN/1.73
GLUCOSE BLDC GLUCOMTR-MCNC: 227 MG/DL (ref 70–130)
GLUCOSE BLDC GLUCOMTR-MCNC: 251 MG/DL (ref 70–130)
GLUCOSE SERPL-MCNC: 280 MG/DL (ref 65–99)
HCT VFR BLD AUTO: 36.4 % (ref 37.5–51)
HGB BLD-MCNC: 12.3 G/DL (ref 13–17.7)
LH SERPL-ACNC: 6.71 MIU/ML
LYMPHOCYTES # BLD MANUAL: 2.46 10*3/MM3 (ref 0.7–3.1)
LYMPHOCYTES NFR BLD MANUAL: 3.3 % (ref 5–12)
LYMPHOCYTES NFR BLD MANUAL: 59.3 % (ref 19.6–45.3)
MAGNESIUM SERPL-MCNC: 1.8 MG/DL (ref 1.6–2.6)
MCH RBC QN AUTO: 29.9 PG (ref 26.6–33)
MCHC RBC AUTO-ENTMCNC: 33.8 G/DL (ref 31.5–35.7)
MCV RBC AUTO: 88.6 FL (ref 79–97)
MONOCYTES # BLD AUTO: 0.13 10*3/MM3 (ref 0.1–0.9)
NEUTROPHILS # BLD AUTO: 1.34 10*3/MM3 (ref 1.7–7)
NEUTROPHILS NFR BLD MANUAL: 33 % (ref 42.7–76)
PHOSPHATE SERPL-MCNC: 3.9 MG/DL (ref 2.5–4.5)
PLAT MORPH BLD: NORMAL
PLATELET # BLD AUTO: 159 10*3/MM3 (ref 140–450)
PMV BLD AUTO: 11.6 FL (ref 6–12)
POTASSIUM SERPL-SCNC: 3.7 MMOL/L (ref 3.5–5.2)
PROLACTIN SERPL-MCNC: 14.3 NG/ML (ref 4.04–15.2)
RBC # BLD AUTO: 4.11 10*6/MM3 (ref 4.14–5.8)
RBC MORPH BLD: NORMAL
SODIUM SERPL-SCNC: 139 MMOL/L (ref 136–145)
VARIANT LYMPHS NFR BLD MANUAL: 1.1 % (ref 0–5)
WBC # BLD AUTO: 4.07 10*3/MM3 (ref 3.4–10.8)
WBC MORPH BLD: NORMAL

## 2021-11-15 PROCEDURE — 82533 TOTAL CORTISOL: CPT | Performed by: HOSPITALIST

## 2021-11-15 PROCEDURE — 80069 RENAL FUNCTION PANEL: CPT | Performed by: INTERNAL MEDICINE

## 2021-11-15 PROCEDURE — 85007 BL SMEAR W/DIFF WBC COUNT: CPT | Performed by: INTERNAL MEDICINE

## 2021-11-15 PROCEDURE — 0 MAGNESIUM SULFATE 4 GM/100ML SOLUTION: Performed by: INTERNAL MEDICINE

## 2021-11-15 PROCEDURE — 83001 ASSAY OF GONADOTROPIN (FSH): CPT | Performed by: HOSPITALIST

## 2021-11-15 PROCEDURE — 82024 ASSAY OF ACTH: CPT | Performed by: NURSE PRACTITIONER

## 2021-11-15 PROCEDURE — 25010000002 DIPHENHYDRAMINE PER 50 MG: Performed by: INTERNAL MEDICINE

## 2021-11-15 PROCEDURE — 86341 ISLET CELL ANTIBODY: CPT | Performed by: HOSPITALIST

## 2021-11-15 PROCEDURE — 63710000001 INSULIN LISPRO (HUMAN) PER 5 UNITS: Performed by: NURSE PRACTITIONER

## 2021-11-15 PROCEDURE — 99231 SBSQ HOSP IP/OBS SF/LOW 25: CPT | Performed by: NURSE PRACTITIONER

## 2021-11-15 PROCEDURE — 25010000002 ENOXAPARIN PER 10 MG: Performed by: INTERNAL MEDICINE

## 2021-11-15 PROCEDURE — 82962 GLUCOSE BLOOD TEST: CPT

## 2021-11-15 PROCEDURE — 83735 ASSAY OF MAGNESIUM: CPT | Performed by: HOSPITALIST

## 2021-11-15 PROCEDURE — A9577 INJ MULTIHANCE: HCPCS | Performed by: INTERNAL MEDICINE

## 2021-11-15 PROCEDURE — 63710000001 INSULIN LISPRO (HUMAN) PER 5 UNITS: Performed by: INTERNAL MEDICINE

## 2021-11-15 PROCEDURE — 85025 COMPLETE CBC W/AUTO DIFF WBC: CPT | Performed by: INTERNAL MEDICINE

## 2021-11-15 PROCEDURE — 70553 MRI BRAIN STEM W/O & W/DYE: CPT

## 2021-11-15 PROCEDURE — 83002 ASSAY OF GONADOTROPIN (LH): CPT | Performed by: HOSPITALIST

## 2021-11-15 PROCEDURE — 99231 SBSQ HOSP IP/OBS SF/LOW 25: CPT | Performed by: PSYCHIATRY & NEUROLOGY

## 2021-11-15 PROCEDURE — 0 GADOBENATE DIMEGLUMINE 529 MG/ML SOLUTION: Performed by: INTERNAL MEDICINE

## 2021-11-15 PROCEDURE — 84146 ASSAY OF PROLACTIN: CPT | Performed by: HOSPITALIST

## 2021-11-15 RX ORDER — INSULIN ASPART 100 [IU]/ML
5 INJECTION, SOLUTION INTRAVENOUS; SUBCUTANEOUS
Qty: 5 PEN | Refills: 0 | Status: SHIPPED | OUTPATIENT
Start: 2021-11-15 | End: 2021-11-19

## 2021-11-15 RX ORDER — INSULIN GLARGINE 100 [IU]/ML
12 INJECTION, SOLUTION SUBCUTANEOUS NIGHTLY
Qty: 15 ML | Refills: 1 | Status: SHIPPED | OUTPATIENT
Start: 2021-11-15 | End: 2021-11-29

## 2021-11-15 RX ORDER — PEN NEEDLE, DIABETIC 30 GX3/16"
1 NEEDLE, DISPOSABLE MISCELLANEOUS 4 TIMES DAILY PRN
Qty: 100 EACH | Refills: 0 | Status: SHIPPED | OUTPATIENT
Start: 2021-11-15 | End: 2022-02-28 | Stop reason: SDUPTHER

## 2021-11-15 RX ORDER — GLUCAGON INJECTION, SOLUTION 1 MG/.2ML
INJECTION, SOLUTION SUBCUTANEOUS
Qty: 0.2 ML | Refills: 0 | Status: SHIPPED | OUTPATIENT
Start: 2021-11-15

## 2021-11-15 RX ORDER — INSULIN LISPRO 100 [IU]/ML
0-7 INJECTION, SOLUTION INTRAVENOUS; SUBCUTANEOUS
Qty: 10 ML | Refills: 1 | Status: SHIPPED | OUTPATIENT
Start: 2021-11-15 | End: 2021-11-19

## 2021-11-15 RX ORDER — INSULIN GLARGINE 100 [IU]/ML
12 INJECTION, SOLUTION SUBCUTANEOUS NIGHTLY
Status: DISCONTINUED | OUTPATIENT
Start: 2021-11-15 | End: 2021-11-15 | Stop reason: HOSPADM

## 2021-11-15 RX ORDER — INSULIN LISPRO 100 [IU]/ML
3 INJECTION, SOLUTION INTRAVENOUS; SUBCUTANEOUS
Qty: 15 ML | Refills: 1 | Status: SHIPPED | OUTPATIENT
Start: 2021-11-15 | End: 2021-11-29 | Stop reason: SDUPTHER

## 2021-11-15 RX ORDER — LANCETS 28 GAUGE
EACH MISCELLANEOUS
Qty: 100 EACH | Refills: 1 | Status: SHIPPED | OUTPATIENT
Start: 2021-11-15 | End: 2021-12-16

## 2021-11-15 RX ORDER — VITAMIN B COMPLEX
1 CAPSULE ORAL DAILY
Qty: 30 CAPSULE | Refills: 0 | Status: SHIPPED | OUTPATIENT
Start: 2021-11-15 | End: 2021-12-27

## 2021-11-15 RX ADMIN — MAGNESIUM SULFATE HEPTAHYDRATE 4 G: 4 INJECTION, SOLUTION INTRAVENOUS at 12:35

## 2021-11-15 RX ADMIN — GADOBENATE DIMEGLUMINE 15 ML: 529 INJECTION, SOLUTION INTRAVENOUS at 11:00

## 2021-11-15 RX ADMIN — INSULIN LISPRO 3 UNITS: 100 INJECTION, SOLUTION INTRAVENOUS; SUBCUTANEOUS at 12:35

## 2021-11-15 RX ADMIN — DIPHENHYDRAMINE HYDROCHLORIDE 25 MG: 50 INJECTION, SOLUTION INTRAMUSCULAR; INTRAVENOUS at 14:04

## 2021-11-15 RX ADMIN — INSULIN LISPRO 4 UNITS: 100 INJECTION, SOLUTION INTRAVENOUS; SUBCUTANEOUS at 08:39

## 2021-11-15 RX ADMIN — INSULIN LISPRO 3 UNITS: 100 INJECTION, SOLUTION INTRAVENOUS; SUBCUTANEOUS at 12:34

## 2021-11-15 RX ADMIN — SODIUM CHLORIDE, PRESERVATIVE FREE 10 ML: 5 INJECTION INTRAVENOUS at 08:42

## 2021-11-15 RX ADMIN — SODIUM CHLORIDE, PRESERVATIVE FREE 10 ML: 5 INJECTION INTRAVENOUS at 08:43

## 2021-11-15 RX ADMIN — INSULIN LISPRO 3 UNITS: 100 INJECTION, SOLUTION INTRAVENOUS; SUBCUTANEOUS at 08:39

## 2021-11-15 RX ADMIN — DIPHENHYDRAMINE HYDROCHLORIDE 25 MG: 50 INJECTION, SOLUTION INTRAMUSCULAR; INTRAVENOUS at 05:33

## 2021-11-15 RX ADMIN — SODIUM CHLORIDE, PRESERVATIVE FREE 3 ML: 5 INJECTION INTRAVENOUS at 08:43

## 2021-11-15 RX ADMIN — ENOXAPARIN SODIUM 40 MG: 40 INJECTION SUBCUTANEOUS at 08:41

## 2021-11-15 RX ADMIN — ACETAMINOPHEN 650 MG: 325 TABLET, FILM COATED ORAL at 12:34

## 2021-11-15 NOTE — PROGRESS NOTES
[facility] NEUROSURGERY PROGRESS NOTE    PATIENT IDENTIFICATION:   Name:  Raquel Sparks      MRN:  [id number]     21 y.o.  male               CC: Blurred vision in left eye      Subjective     Interval History:  Feels pretty good. States that the blurred vision in his left eye comes and goes. No other new complaints at this time. Mother is at bedside.     ROS:  Constitutional: No fever, chills  HEENT: No headache, no tinnitus, no hearing difficulties  GI: No nausea, vomiting, no swallow difficulties  Neuro: No numbness, tingling, or weakness, no speech difficulties, no balance difficulties    Objective     Vital signs in last 24 hours:  Temp:  [96.6 °F (35.9 °C)-98.5 °F (36.9 °C)] 96.6 °F (35.9 °C)  Heart Rate:  [57-80] 57  Resp:  [16-18] 16  BP: ()/(51-76) 102/65      Intake/Output this shift:  I/O this shift:  In: 580 [P.O.:580]  Out: -       Intake/Output last 3 shifts:  I/O last 3 completed shifts:  In: 1278.8 [P.O.:360; I.V.:918.8]  Out: -     LABS:  Results from last 7 days   Lab Units 11/15/21  0721 11/14/21  0839 11/13/21  0610   WBC 10*3/mm3 4.07 4.16 5.82   HEMOGLOBIN g/dL 12.3* 11.9* 12.9*   HEMATOCRIT % 36.4* 33.6* 38.9   PLATELETS 10*3/mm3 159 160 180     Results from last 7 days   Lab Units 11/15/21  0721 11/14/21  0839 11/13/21  0610 11/11/21  0826 11/11/21  0640   SODIUM mmol/L 139 140 143   < > 139   POTASSIUM mmol/L 3.7 3.5 3.1*   < > 3.7   CHLORIDE mmol/L 105 108* 110*   < > 107   CO2 mmol/L 26.7 24.6 24.9   < > 12.0*   BUN mg/dL 13 10 10   < > 14   CREATININE mg/dL 1.02 0.84 0.91   < > 1.31*   CALCIUM mg/dL 7.9* 7.9* 8.4*   < > 9.7   BILIRUBIN mg/dL  --   --   --   --  0.3   ALK PHOS U/L  --   --   --   --  101   ALT (SGPT) U/L  --   --   --   --  26   AST (SGOT) U/L  --   --   --   --  13   GLUCOSE mg/dL 280* 227* 52*   < > 317*    < > = values in this interval not displayed.          IMAGING STUDIES:  MRI PITUITARY W/ AND W/O CONTRAST 11/15-  Localized widening of the left side of  the sella turcica containing CSF consistent with a small arachnoid cyst or simply a partially empty sella. Otherwise unremarkable MRI of the brain with/to the pituitary gland.    I personally viewed and interpreted the patient's clinical data and MRI pituitary results and discussed the results with Dr. Ford who also viewed and interpreted the MRI.    Meds reviewed/changed: Yes    Scheduled Meds:enoxaparin, 40 mg, Subcutaneous, Q24H  insulin glargine, 12 Units, Subcutaneous, Nightly  insulin lispro, 0-7 Units, Subcutaneous, TID AC  insulin lispro, 3 Units, Subcutaneous, TID With Meals  senna-docusate sodium, 2 tablet, Oral, BID  sodium chloride, 2,000 mL, Intravenous, Once  sodium chloride, 10 mL, Intravenous, Q12H  sodium chloride, 10 mL, Intravenous, Q12H  sodium chloride, 3 mL, Intravenous, Q12H  thiamine (VITAMIN B1) IVPB, 200 mg, Intravenous, Daily      Continuous Infusions:   PRN Meds:.•  acetaminophen **OR** acetaminophen **OR** acetaminophen  •  acetaminophen **OR** acetaminophen  •  senna-docusate sodium **AND** polyethylene glycol **AND** bisacodyl **AND** bisacodyl  •  calcium carbonate  •  Calcium Gluconate-NaCl **AND** calcium gluconate IVPB **AND** Calcium  •  dextrose  •  dextrose  •  diphenhydrAMINE  •  glucagon (human recombinant)  •  HYDROcodone-acetaminophen  •  magnesium sulfate **OR** magnesium sulfate **OR** magnesium sulfate  •  melatonin  •  ondansetron **OR** ondansetron  •  potassium & sodium phosphates **OR** potassium & sodium phosphates  •  potassium chloride  •  potassium chloride  •  sodium chloride  •  sodium chloride      Physical Exam:    General:   Awake, alert, oriented x3. Speech clear with no aphasia    CN III IV VI: Extraocular movements are full , PERRL. Blind in the right eye. Reports intermittent blurriness in left eye.  CN V: Normal facial sensation and strength of muscles of mastication.  CN VII: Facial movements are symmetric. No weakness.      Motor: Normal muscle  "strength, bulk and tone in upper and lower extremities.    Sensation: Normal to light touch; no extinction  Station and Gait: Normal gait and station.    Coordination:  Finger-to-nose test shows no dysmetria.    Extremities:   Wearing SCDs    Assessment/Plan     ASSESSMENT:      Diabetic ketoacidosis without coma associated with type 1 diabetes mellitus (HCC)    Hypomagnesemia    Anemia    Abnormal MRI    20 y/o male patient who presented with DKA and noticed some blurring in his left eye. Brain MRI revealed a cystic structure abutting the left margin of the pituitary. Further imaging with pituitary MRI showed a localized area of widening of the left side of the sella turcica containing CSF consistent with a small arachnoid cyst or simply a partially empty sella.     On exam, the patient states that the left eye blurry vision is intermittent, however no other focal deficits noted.   The left eye blurriness is more than likely related to his newly diagnosed Type 1 DM as there was nothing on the pituitary MRI to suggest a pituitary mass or compression of the optic nerves. We recommend the patient following up with an ophthalmologist on an outpatient basis for visual field testing. At this time, there is nothing further from a neurosurgical standpoint and we will sign off but will remain available for any questions or concerns. Would like for the patient to follow-up with us in about 8 weeks once he has seen the ophthalmologist.    PLAN:   Recommend follow-up with ophthalmologist on an outpatient basis for visual field testing  Would like for the patient to follow-up with us in about 8 weeks once he has seen the ophthalmologist      I discussed the patient's findings and my recommendations with patient, family, nursing staff and Dr. Ford       LOS: 4 days       Stephanie Daniel, APRN  11/15/2021  15:31 EST    \"Dictated utilizing Dragon dictation\".    "

## 2021-11-15 NOTE — PLAN OF CARE
Goal Outcome Evaluation:  Plan of Care Reviewed With: patient        Progress: improving  Outcome Summary: BG still flucuating between high and low. Awaiting MRI. Pt c/o significant itching on torso and back. Benadryl ordered and given. Lotion applied. VSS. Will continue to monitor.

## 2021-11-15 NOTE — OUTREACH NOTE
Prep Survey      Responses   [facility] facility patient discharged from? [place]   Is LACE score < 7 ? No   Emergency Room discharge w/ pulse ox? No   Eligibility [facility]   Date of Admission 11/11/21   Date of Discharge 11/15/21   Discharge Disposition Home or Self Care   Discharge diagnosis DKA, T1DM, anemia, cyst near pituitary gland   Does the patient have one of the following disease processes/diagnoses(primary or secondary)? Other   Does the patient have Home health ordered? No   Is there a DME ordered? No   Is this a private patient? Yes   Prep survey completed? Yes          Pravin Donovan RN

## 2021-11-15 NOTE — NURSING NOTE
"Diabetes Education  Assessment/Teaching    Patient Name:  Raquel Sparks  YOB: 2000  MRN: [id number]  Admit Date:  11/11/2021      Assessment Date:  11/15/2021    Most Recent Value   General Information     Referral From: Other -RN staff -for review. F/u with 20 y/o at bedside while his mother present.    Height 175.3 cm (69.02\")   Height Method Stated   Weight 72.8 kg (160 lb 7.9 oz)   Weight Method Standing scale   Diabetes History    What type of diabetes do you have? Type 1   Length of Diabetes Diagnosis Newly diagnosed <6 months   Have you had diabetes education/teaching in the past? Yes- initiated here inpt    Do you test your blood sugar at home? yes   Have you had high blood sugar? (>140mg/dl) Yes- current a1C >13%.   How often do you have high blood sugar? frequently   Education Preferences    Barriers to Learning -- visual acuity. Somewhat blurry vision.    Assessment Topics    Taking Medication - Assessment Needs education/Review. Anticipate pt will dc home new to insulin. Pt has the Lantus and Humalog pens here at bedside -through 'meds to beds.'    Problem Solving - Assessment Needs education/Review   Reducing Risk - Assessment Needs education   Healthy Coping - Assessment Competent -supportive mother here at bedside.   Monitoring - Assessment Competent   DM Goals    Contact Plan Follow-up medical care            Most Recent Value   DM Education Needs    Meter Has own   Meter Type --Relion.   Frequency of Testing AC/HS   Medication Insulin,  Correct use of insulin pen. Review Lantus name/axn/12 unit dose, Humalog/lispro name/scheduling and 3 unit meal dose. Correction (or sliding scale) dose.    Problem Solving Hypoglycemia,  Hyperglycemia,  Signs,  Treatment   Reducing Risks A1C testing   Physical Activity Basketball, other sports    Healthy Coping Appropriate   Discharge Plan Home,  Follow-up with MD   Motivation Engaged   Teaching Method Explanation,  Discussion,  Demonstration " of insulin pen use,  Handouts,  Teach back   Patient Response Verbalized understanding      Other Comments:  Reinforce w/pt, mother that pt will benefit from outpt f/u with endo.   Electronically signed by:  Nathaly Melo RN, BSN, [facility]  11/15/21 18:04 EST

## 2021-11-15 NOTE — DISCHARGE SUMMARY
[facility]  [phone]    DISCHARGE SUMMARY  [facility]    Patient Identification:  Name: Raquel Sparks  Age: 21 y.o.  Sex: male  :  2000  MRN: [id number]  Primary Care Physician: Ralf Albrecht MD    Admit date: 2021  Discharge date and time:      Discharge Diagnoses:  Diabetic ketoacidosis without coma associated with type 1 diabetes mellitus (HCC)    Hypomagnesemia    Anemia    Abnormal MRI       History of present illness from H&P:    Mr. García is a 20yo male with recently diagnosed DM, he was placed on metformin but mother reports his blood sugars have not been controlld on this and he has been very fatigued as well.  He has had some nausea and vomiting  And visual changes. He has had polydipsia and polyuria.  Symptoms have been pretty consistent, worsening, become severe today. No alleviating or exacerbating factors.  He was found to be in DKA and was started on inuslin gtt. He is hungry now and gap is closed.  Mother reports constipation as well.    Hospital Course:     Patient was admitted to the hospital and started on insulin drip for DKA.  Patient is now tolerating oral intake feeling much better and anxious for discharge.    Patient did have significant low blood sugars each morning over the last few mornings.  Likely patient still has some pancreatic function so only requires approximately 12 units of Lantus at night along with mealtime insulin during the day.  Patient will need to follow-up with endocrinology outpatient and the patient has been educated on a sliding scale insulin as well.    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  Patient was patient was seen by neurology and MRI showed cyst near the pituitary gland therefore underwent an MRI of the pituitary and was seen by neurosurgery.  Neurosurgery would like  to see the patient back in 8 weeks.  And the patient needs to follow-up with ophthalmology per neurosurgery's instructions, nurse will give them information for [facility] on [address].    Basic labs for pituitary were ordered including FSH LH, cortisol level, prolactin which were unremarkable.  ACTH level is currently pending and sheila as well as islet cell antibodies currently pending.      The patient was seen and examined on the day of discharge.    Consults:   Consults     Date and Time Order Name Status Description    11/15/2021  2:36 PM Inpatient Endocrinology Consult      11/14/2021  9:47 AM Inpatient Internal Medicine Consult Completed     11/14/2021 12:33 AM Inpatient Neurology Consult General Completed     11/13/2021  6:41 PM Inpatient Neurosurgery Consult      11/11/2021  8:04 AM Pulmonology (on-call MD unless specified)            Results from last 7 days   Lab Units 11/15/21  0721   WBC 10*3/mm3 4.07   HEMOGLOBIN g/dL 12.3*   HEMATOCRIT % 36.4*   PLATELETS 10*3/mm3 159       Results from last 7 days   Lab Units 11/15/21  0721   SODIUM mmol/L 139   POTASSIUM mmol/L 3.7   CHLORIDE mmol/L 105   CO2 mmol/L 26.7   BUN mg/dL 13   CREATININE mg/dL 1.02   GLUCOSE mg/dL 280*   CALCIUM mg/dL 7.9*       Significant Diagnostic Studies:   WBC   Date Value Ref Range Status   11/15/2021 4.07 3.40 - 10.80 10*3/mm3 Final     Hemoglobin   Date Value Ref Range Status   11/15/2021 12.3 (L) 13.0 - 17.7 g/dL Final     Hematocrit   Date Value Ref Range Status   11/15/2021 36.4 (L) 37.5 - 51.0 % Final     Platelets   Date Value Ref Range Status   11/15/2021 159 140 - 450 10*3/mm3 Final     Sodium   Date Value Ref Range Status   11/15/2021 139 136 - 145 mmol/L Final     Potassium   Date Value Ref Range Status   11/15/2021 3.7 3.5 - 5.2 mmol/L Final     Chloride   Date Value Ref Range Status   11/15/2021 105 98 - 107 mmol/L Final     CO2   Date Value Ref Range Status   11/15/2021 26.7 22.0 - 29.0 mmol/L Final      BUN   Date Value Ref Range Status   11/15/2021 13 6 - 20 mg/dL Final     Creatinine   Date Value Ref Range Status   11/15/2021 1.02 0.76 - 1.27 mg/dL Final     Glucose   Date Value Ref Range Status   11/15/2021 280 (H) 65 - 99 mg/dL Final     Calcium   Date Value Ref Range Status   11/15/2021 7.9 (L) 8.6 - 10.5 mg/dL Final     Magnesium   Date Value Ref Range Status   11/15/2021 1.8 1.6 - 2.6 mg/dL Final     Phosphorus   Date Value Ref Range Status   11/15/2021 3.9 2.5 - 4.5 mg/dL Final     No results found for: AST, ALT, ALKPHOS  No results found for: APTT, INR  No results found for: COLORU, CLARITYU, SPECGRAV, PHUR, PROTEINUR, GLUCOSEU, KETONESU, BLOODU, NITRITE, LEUKOCYTESUR, BILIRUBINUR, UROBILINOGEN, RBCUA, WBCUA, BACTERIA, UACOMMENT  No results found for: TROPONINT, TROPONINI, BNP  No components found for: HGBA1C;2  No components found for: TSH;2    Imaging Results (All)     Procedure Component Value Units Date/Time    MRI Pituitary With & Without Contrast [[id number]] Collected: 11/15/21 1357     Updated: 11/15/21 1422    Narrative:      MRI PITUITARY W WO CONTRAST-     CLINICAL HISTORY: Follow-up possible cystic lesion adjacent to the  pituitary gland seen on previous imaging. Follow up minimal  periaqueductal T2 hyperintensity.     TECHNIQUE: Multiple axial T1, T2 and diffusion images were acquired.  Thin section coronal and sagittal T1 and T2-weighted images were  obtained through the sella turcica including T1-weighted images acquired  after intravenous injection of MultiHance contrast.      COMPARISON: MRIs of the brain dated 11/13/2021 and 1/6/2021.     FINDINGS: The brain and ventricular system appear structurally normal.  There is no evidence of recent or old infarct or hemorrhage. No foci of  restricted diffusion are identified in the brain or brainstem. The MRI  on 01/06/2021 showed minimal T2 high signal in the periaqueductal white  matter. This is less prominent on the current study, and was  most likely  due to artifact. The possible small cystic lesion located along the left  side of the pituitary gland shown on the previous MRI study is better  delineated on the current MRI due to the thin section T2-weighted images  and postcontrast T1-weighted images through the sella turcica. This  appears to represent CSF extending into the left side of the sella  turcica producing mild localized bone remodeling and widening of the  sella turcica on the left and perhaps minimal flattening of the left  side of the pituitary gland. In addition, the pituitary stalk is  deviated slightly toward the right. The findings are consistent with a  partially empty sella, or possibly a small arachnoid cyst. Regardless,  this is unlikely to be of any clinical significance. The pituitary gland  shows normal homogeneous enhancement. No abnormal enhancement is  identified within the brain parenchyma. There are no masses. The  paranasal sinuses are all well aerated. Normal flow voids are observed  in the major vascular structures.       Impression:      Localized widening of the left side of the sella turcica  containing CSF consistent with a small arachnoid cyst or simply a  partially empty sella. Otherwise unremarkable MRI of the brain with/to  the pituitary gland. Minimal T2 hyperintensity in the periaqueductal  white matter shown on previous imaging is less prominent, and was likely  artifactual     This report was finalized on 11/15/2021 2:19 PM by Dr. Roger Raya M.D.       MRI Brain Without Contrast [[id number]] Collected: 11/13/21 1625     Updated: 11/13/21 1656    Narrative:      MR SCAN OF THE BRAIN WITHOUT CONTRAST     HISTORY: Hyperglycemia. Blurred vision.     The MR scan was performed with sagittal and axial images without  contrast and compared to previous MRI scan dated 01/06/2021. The  ventricles are normal in size and midline. Some previous T2 FLAIR  hyperintensity in the periaqueductal gray matter noted on  01/06/2021 is  less apparent on today's exam and remains nonspecific. There is no  evidence of intracranial hemorrhage or mass effect and the diffusion  sequence appears normal.     There are normal flow voids in the major vessels. There is a 7 x 10 mm  cystic structure abutting the left margin of the pituitary gland and the  medial margin of the cavernous carotid artery. It appears to be  displacing the cavernous carotid artery laterally. It does not abut the  optic chiasm but could potentially relate to the patient's symptoms of  visual changes.     CONCLUSION: Cystic structure abutting the left margin of the pituitary  gland and causing slight displacement of the cavernous carotid artery  laterally and measuring 7 x 10 mm. It shows no change in size from the  previous MRI scan of 01/06/2021. The previous scan was performed with  intravenous contrast and shows no abnormal enhancement. The findings  suggest a cystic lesion that may relate to the pituitary gland. The  pituitary gland is otherwise normal in appearance. Further evaluation  with pituitary protocol MRI may be helpful.     This report was finalized on 11/13/2021 4:53 PM by Dr. Pito Perez M.D.       XR Chest 1 View [[id number]] Collected: 11/11/21 0639     Updated: 11/11/21 0643    Narrative:      XR CHEST 1 VW-     Clinical: Hyperglycemia, generalized weakness     FINDINGS: Normal heart size. Mediastinum and froilan have a satisfactory  appearance. No effusion, edema or acute airspace disease. Lungs clear.     CONCLUSION: Normal chest     This report was finalized on 11/11/2021 6:40 AM by Dr. Kong Sandy M.D.         No results found for: SITE, ALLENTEST, PHART, YTW2KOT, PO2ART, VPS5AEP, BASEEXCESS, W7JFGMFX, HGBBG, HCTABG, OXYHEMOGLOBI, METHHGBN, CARBOXYHGB, CO2CT, BAROMETRIC, MODALITY, FIO2       Discharge Medications      New Medications      Instructions Start Date   b complex vitamins capsule   1 capsule, Oral, Daily      glucose blood test  strip   test blood sugar 4 times a day      Glucose Management tablet   Use as needed for emergently low blood glucose levels. Formulary Compliance Approval      Gvoke HypoPen 1-Pack 1 MG/0.2ML solution auto-injector  Generic drug: Glucagon   Use as directed for emergently low blood glucose level.      HumaLOG KwikPen 100 UNIT/ML solution pen-injector  Generic drug: Insulin Lispro (1 Unit Dial)   3 Units, Subcutaneous, 3 Times Daily With Meals      insulin lispro 100 UNIT/ML injection  Commonly known as: ADMELOG   0-7 Units, Subcutaneous, 3 Times Daily Before Meals      insulin detemir 100 UNIT/ML injection  Commonly known as: LEVEMIR   12 Units, Subcutaneous, Nightly, Formulary Compliance Approval      Lantus SoloStar 100 UNIT/ML injection pen  Generic drug: Insulin Glargine   12 Units, Subcutaneous, Nightly      Microlet Lancets misc   Ok to substitute for the patients needs      NovoFine Plus Pen Needle 32G X 4 MM misc  Generic drug: Insulin Pen Needle   1 each, Subcutaneous, 4 Times Daily PRN, Formulary Compliance Approval      NovoLOG FlexPen 100 UNIT/ML solution pen-injector sc pen  Generic drug: insulin aspart   5 Units, Subcutaneous, 3 Times Daily With Meals, Formulary Compliance Approval         Stop These Medications    acetaminophen 500 MG tablet  Commonly known as: TYLENOL     ibuprofen 400 MG tablet  Commonly known as: ADVIL,MOTRIN     metFORMIN 500 MG tablet  Commonly known as: GLUCOPHAGE              Patient Instructions:       Future Appointments   Date Time Provider Department Center   11/19/2021  3:30 PM Ralf Albrecht MD [facility]  [facility]         Follow-up Information     Ralf Albrecht MD Follow up in 1 week(s).    Specialty: Internal Medicine  Contact information:  [facility] [id number] [phone]             Jd Pike MD Follow up in 2 week(s).    Specialty: Endocrinology  Contact information:  [facility] [id number] [phone]                          Discharge Order (From admission, onward)     Start     Ordered    11/15/21 0923  Discharge patient  Once        Expected Discharge Date: 11/15/21    Discharge Disposition: Home or Self Care    Physician of Record for Attribution - Please select from Treatment Team: CRISPIN GUTIERRES [[id number]]    Review needed by CMO to determine Physician of Record: No       Question Answer Comment   Physician of Record for Attribution - Please select from Treatment Team CRISPIN GUTIERRES    Review needed by CMO to determine Physician of Record No        11/15/21 0927                Diet Order   Procedures   • Diet Regular; Consistent Carbohydrate, Lactose Restricted       TEST  RESULTS PENDING AT DISCHARGE  Pending Labs     Order Current Status    ACTH In process    Anti-islet Cell Antibody In process    Glutamic Acid Decarboxylase In process            Discharge instructions:  Follow up with your primary care provider in 1-2 weeks with a cbc and cmp         Total time spent discharging patient including evaluation, post hospitalization follow up,  medication and post hospitalization instructions and education, total time exceeds 30 minutes.    Signed:  Crispin Gutierres MD  11/15/2021  17:26 EST

## 2021-11-15 NOTE — PROGRESS NOTES
Patient Identification:  NAME:  Raquel Sparks  Age:  21 y.o.   Sex:  male   :  2000   MRN:  [id number]       Chief complaint: Pituitary mass, blurred vision left eye, newly diagnosed type 1 diabetes    History of present illness: He feels like he is doing well today really has no new complaints he feels like his vision in the left eye is good now but he thought it got blurry when he was getting rolled downstairs for the MRI scan.  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  Context is a patient who has newly diagnosed type 1 diabetes mellitus    Past medical history:  Past Medical History:   Diagnosis Date   • ADHD    • Asthma    • Diabetes mellitus (HCC)    • Visual impairment     blind in right eye       Allergies:  Strawberry, Strawberry (diagnostic), and [place] extract    Home medications:  Medications Prior to Admission   Medication Sig Dispense Refill Last Dose   • metFORMIN (GLUCOPHAGE) 500 MG tablet Take 500 mg by mouth 2 (Two) Times a Day With Meals.      • acetaminophen (TYLENOL) 500 MG tablet Take 500 mg by mouth.      • ibuprofen (ADVIL,MOTRIN) 400 MG tablet Take 400 mg by mouth.           Hospital medications:  enoxaparin, 40 mg, Subcutaneous, Q24H  insulin glargine, 12 Units, Subcutaneous, Nightly  insulin lispro, 0-7 Units, Subcutaneous, TID AC  insulin lispro, 3 Units, Subcutaneous, TID With Meals  senna-docusate sodium, 2 tablet, Oral, BID  sodium chloride, 2,000 mL, Intravenous, Once  sodium chloride, 10 mL, Intravenous, Q12H  sodium chloride, 10 mL, Intravenous, Q12H  sodium chloride, 3 mL, Intravenous, Q12H         •  acetaminophen **OR** acetaminophen **OR** acetaminophen  •  acetaminophen **OR** acetaminophen  •  senna-docusate sodium **AND** polyethylene glycol **AND** bisacodyl **AND** bisacodyl  •  calcium carbonate  •  Calcium Gluconate-NaCl **AND** calcium gluconate IVPB **AND** Calcium  •  dextrose  •  dextrose  •   diphenhydrAMINE  •  glucagon (human recombinant)  •  HYDROcodone-acetaminophen  •  magnesium sulfate **OR** magnesium sulfate **OR** magnesium sulfate  •  melatonin  •  ondansetron **OR** ondansetron  •  potassium & sodium phosphates **OR** potassium & sodium phosphates  •  potassium chloride  •  potassium chloride  •  sodium chloride  •  sodium chloride      Objective:  Vitals Ranges:   Temp:  [96.6 °F (35.9 °C)-98.5 °F (36.9 °C)] 96.6 °F (35.9 °C)  Heart Rate:  [57-80] 57  Resp:  [16-18] 16  BP: ()/(51-76) 102/65      Physical Exam:  Patient is awake and alert has conjugate gaze as rest but he becomes obvious his right eye is blind and has decreased extraocular movements compared to the left which is normal left pupil constricting 2-1/2-2 vision up close and far away is normal in the left eye and visual fields are normal in the left eye again he is blind in the right eye normal remainder of the cranial nerves II through VII tongue is midline excellent motor x4 extremities reflexes trace throughout symmetrical toes downgoing bilaterally    Results review:   I reviewed the patient's new clinical results.    Data review:  Lab Results (last 24 hours)     Procedure Component Value Units Date/Time    FSH & LH [[id number]] Collected: 11/15/21 0721    Specimen: Blood Updated: 11/15/21 1057     FSH 0.81 mIU/mL      LH 6.71 mIU/mL     Narrative:      FSH Reference Ranges:    Adult Males:  1.5-12.4 mIU/mL    Adult Females:   Folicular Phase  3.5-12.5 mIU/ml   Ovulation Phase  4.7-21.5 mIU/ml   Lutal Phase      1.7-7.7 mIU/ml   Postmenopausal   25.8-134.8 mIU/ml     LH Reference Ranges:    Adult Males:  1.7-8.6 mIU/ml    Adult Females:  Folicular Phase  2.4-12.6 mIU/ml   Ovulation Phase  14.0-95.6 mIU/ml   Lutal Phase      1.0-11.4 mIU/ml   Postmenopausal   7.7-58.5 mIU/ml    Results may be falsely decreased if patient taking Biotin.      Prolactin [[id number]]  (Normal) Collected: 11/15/21 0721    Specimen: Blood  Updated: 11/15/21 1057     Prolactin 14.30 ng/mL     Narrative:      Results may be falsely decreased if patient taking Biotin.     Manual Differential [[id number]]  (Abnormal) Collected: 11/15/21 0721    Specimen: Blood Updated: 11/15/21 0946     Neutrophil % 33.0 %      Lymphocyte % 59.3 %      Monocyte % 3.3 %      Eosinophil % 3.3 %      Atypical Lymphocyte % 1.1 %      Neutrophils Absolute 1.34 10*3/mm3      Lymphocytes Absolute 2.46 10*3/mm3      Monocytes Absolute 0.13 10*3/mm3      Eosinophils Absolute 0.13 10*3/mm3      RBC Morphology Normal     WBC Morphology Normal     Platelet Morphology Normal    CBC & Differential [[id number]]  (Abnormal) Collected: 11/15/21 0721    Specimen: Blood Updated: 11/15/21 0946    Narrative:      The following orders were created for panel order CBC & Differential.  Procedure                               Abnormality         Status                     ---------                               -----------         ------                     CBC Auto Differential[[id number]]        Abnormal            Final result                 Please view results for these tests on the individual orders.    CBC Auto Differential [[id number]]  (Abnormal) Collected: 11/15/21 0721    Specimen: Blood Updated: 11/15/21 0946     WBC 4.07 10*3/mm3      RBC 4.11 10*6/mm3      Hemoglobin 12.3 g/dL      Hematocrit 36.4 %      MCV 88.6 fL      MCH 29.9 pg      MCHC 33.8 g/dL      RDW 14.1 %      RDW-SD 45.0 fl      MPV 11.6 fL      Platelets 159 10*3/mm3     Cortisol [[id number]] Collected: 11/15/21 0721    Specimen: Blood Updated: 11/15/21 0912     Cortisol 3.17 mcg/dL     Narrative:      Cortisol Reference Ranges:    Cortisol 6AM - 10AM Range: 6.02-18.40 mcg/dl  Cortisol 4PM - 8PM Range: 2.68-10.50 mcg/dl      Results may be falsely increased if patient taking Biotin.      Renal Function Panel [[id number]]  (Abnormal) Collected: 11/15/21 0721    Specimen: Blood Updated: 11/15/21 0910     Glucose 280 mg/dL       BUN 13 mg/dL      Creatinine 1.02 mg/dL      Sodium 139 mmol/L      Potassium 3.7 mmol/L      Chloride 105 mmol/L      CO2 26.7 mmol/L      Calcium 7.9 mg/dL      Albumin 2.90 g/dL      Phosphorus 3.9 mg/dL      Anion Gap 7.3 mmol/L      BUN/Creatinine Ratio 12.7     eGFR  African Amer 110 mL/min/1.73     Narrative:      GFR Normal >60  Chronic Kidney Disease <60  Kidney Failure <15      Magnesium [[id number]]  (Normal) Collected: 11/15/21 0721    Specimen: Blood Updated: 11/15/21 0910     Magnesium 1.8 mg/dL     Glutamic Acid Decarboxylase [[id number]] Collected: 11/15/21 0721    Specimen: Blood Updated: 11/15/21 0819    Anti-islet Cell Antibody [[id number]] Collected: 11/15/21 0721    Specimen: Blood Updated: 11/15/21 0819    ACTH [[id number]] Collected: 11/15/21 0721    Specimen: Blood Updated: 11/15/21 0817    POC Glucose Once [[id number]]  (Abnormal) Collected: 11/15/21 0720    Specimen: Blood Updated: 11/15/21 0722     Glucose 251 mg/dL      Comment: Meter: [id number] : [id number] Roel Oscar NA       POC Glucose Once [[id number]]  (Abnormal) Collected: 11/14/21 2003    Specimen: Blood Updated: 11/14/21 2004     Glucose 232 mg/dL      Comment: Meter: [id number] : [id number] Judah Matos NA       POC Glucose Once [[id number]]  (Abnormal) Collected: 11/14/21 1739    Specimen: Blood Updated: 11/14/21 1741     Glucose 340 mg/dL      Comment: Meter: [id number] : [id number] Dorothea Bartholomew NA       POC Glucose Once [[id number]]  (Abnormal) Collected: 11/14/21 1623    Specimen: Blood Updated: 11/14/21 1625     Glucose 313 mg/dL      Comment: Meter: [id number] : [id number] Dorothea Janganda NA       T4, Free [[id number]]  (Normal) Collected: 11/14/21 0839    Specimen: Blood Updated: 11/14/21 1212     Free T4 1.36 ng/dL     Narrative:      Results may be falsely increased if patient taking Biotin.      T3, Free [[id number]]  (Normal) Collected: 11/14/21 0839    Specimen: Blood Updated: 11/14/21 1212     T3, Free  2.43 pg/mL     Narrative:      Results may be falsely increased if patient taking Biotin.      D-dimer, Quantitative [[id number]]  (Abnormal) Collected: 11/14/21 1139    Specimen: Blood Updated: 11/14/21 1210     D-Dimer, Quantitative 1.33 MCGFEU/mL     Narrative:      The Stago D-Dimer test used in conjunction with a clinical pretest probability (PTP) assessment model, has been approved by the FDA to rule out the presence of venous thromboembolism (VTE) in outpatients suspected of deep venous thrombosis (DVT) or pulmonary embolism (PE). The cut-off for negative predictive value is <0.50 MCGFEU/mL.    POC Glucose Once [[id number]]  (Normal) Collected: 11/14/21 1201    Specimen: Blood Updated: 11/14/21 1202     Glucose 93 mg/dL      Comment: Meter: [id number] : [id number] Dorothea CARDENAS              Imaging:  Imaging Results (Last 24 Hours)     Procedure Component Value Units Date/Time    MRI Pituitary With & Without Contrast [[id number]] Resulted: 11/15/21 1050     Updated: 11/15/21 1125         PPE worn at all times washed before washed afterwards disposed of everything properly was now within 6 feet of them for more than few minutes during my exam no aerosols used at any point    Assessment and Plan:     This patient has newly diagnosed type 1 diabetes.  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  His vision in the left eye appears to be normal and there is no field cut either lets see what the MRI of the pituitary gland shows.  I am not at all convinced that he has a Wernicke encephalopathy but I am going to give him some extra thiamine today and tomorrow 200 mg IV daily for a couple of days.  He definitely has a cystic mass in his pituitary gland that is being worked up appropriately.  Nonetheless note that FSH, LH, TSH and prolactin are all normal his cortisol is also normal and an ACTH level has been drawn for him  earlier today  Given the normal left eye vision at this time I certainly do not believe he has an acute optic neuritis.  Note that blurring in the vision of his left eye it would be very typical of someone with diabetic ketoacidosis and that is now improved      Peter Pablo MD  11/15/21  11:58 EST

## 2021-11-16 ENCOUNTER — TRANSITIONAL CARE MANAGEMENT TELEPHONE ENCOUNTER (OUTPATIENT)
Dept: CALL CENTER | Facility: HOSPITAL | Age: 21
End: 2021-11-16

## 2021-11-16 LAB — ACTH PLAS-MCNC: 19.2 PG/ML (ref 7.2–63.3)

## 2021-11-16 NOTE — OUTREACH NOTE
Call Center TCM Note      Responses   [facility] patient discharged from? [facility]   Does the patient have one of the following disease processes/diagnoses(primary or secondary)? Other   TCM attempt successful? 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 Yes   Is the patient having any side effects they believe may be caused by any medication additions or changes? No   Does the patient have all medications ordered at discharge? Yes   Is the patient taking all medications as directed (includes completed medication regime)? Yes   Does the patient have a primary care provider?  Yes   Does the patient have an appointment with their PCP within 7 days of discharge? Yes   Comments regarding PCP TCM FWP with PCP Dr Albrecht is 11/19/2021.   Has the patient kept scheduled appointments due by today? N/A   Has home health visited the patient within 72 hours of discharge? N/A   Psychosocial issues? No   Did the patient receive a copy of their discharge instructions? Yes   Nursing interventions Reviewed instructions with patient   What is the patient's perception of their health status since discharge? Improving   Is the patient/caregiver able to teach back signs and symptoms related to disease process for when to call PCP? Yes   Is the patient/caregiver able to teach back signs and symptoms related to disease process for when to call 911? Yes   Is the patient/caregiver able to teach back the hierarchy of who to call/visit for symptoms/problems? PCP, Specialist, Home health nurse, Urgent Care, ED, 911 Yes   If the patient is a current smoker, are they able to teach back resources for cessation? Not a smoker   TCM call completed? Yes   Wrap up additional comments Pt states he is feeling better, has all the many new meds and DME needed for BS control in place. No questions at this time. TCM FWP with PCP Dr Albrecht is 11/19/2021.          Francesca Mendoza  MA    11/16/2021, 13:16 EST

## 2021-11-16 NOTE — PROGRESS NOTES
Case Management Discharge Note      Final Note: DC'ed home no needs         Selected Continued Care - Discharged on 11/15/2021 Admission date: 11/11/2021 - Discharge disposition: Home or Self Care    Destination    No services have been selected for the patient.              Durable Medical Equipment    No services have been selected for the patient.              Dialysis/Infusion    No services have been selected for the patient.              Home Medical Care    No services have been selected for the patient.              Therapy    No services have been selected for the patient.              Community Resources    No services have been selected for the patient.              Community & DME    No services have been selected for the patient.                       Final Discharge Disposition Code: 01 - home or self-care

## 2021-11-16 NOTE — PAYOR COMM NOTE
"CLINICAL PER REQUEST OF [facility] RN, ICM  REF #[id number]  P:  [phone]  F:  [phone]        Raquel Sparks (21 y.o. Male)             Date of Birth Social Security Number Address Home Phone MRN    2000  [id number]  [address]  [facility] [address] [phone] [id number]    Sikh Marital Status             Zoroastrian Single       Admission Date Admission Type Admitting Provider Attending Provider Department, Room/Bed    11/11/21 Emergency Jesús Brasher MD  [facility] 3 [place], P384/1    Discharge Date Discharge Disposition Discharge Destination          11/15/2021 Home or Self Care              Attending Provider: (none)   Allergies: Strawberry, Strawberry (Diagnostic), Strawberry Extract    Isolation: None   Infection: COVID (History) (11/12/21)   Code Status: Prior   Advance Care Planning Activity    Ht: 175.3 cm (69.02\")   Wt: 72.8 kg (160 lb 7.9 oz)    Admission Cmt: None   Principal Problem: Diabetic ketoacidosis without coma associated with type 1 diabetes mellitus (HCC) [E10.10]                 Active Insurance as of 11/11/2021     Primary Coverage     Payor Plan Insurance Group Employer/Plan Group    HUMANA HUMANA [id number]     Payor Plan Address Payor Plan Phone Number Payor Plan Fax Number Effective Dates    [address] [address] [phone]  1/1/2020 - None Entered    [facility] [id number]       Subscriber Name Subscriber Birth Date Member ID       RAQUEL SPARKS I 12/5/1972 [id number]           Secondary Coverage     Payor Plan Insurance Group Employer/Plan Group    [facility] [id number]     Payor Plan Address Payor Plan Phone Number Payor Plan Fax Number Effective Dates    [address]   9/20/2021 - None Entered    [facility] [id number]       Subscriber Name Subscriber Birth Date Member ID       ALEJANDRAVINCENT 5/14/1974 [id number]                 Emergency Contacts      (Rel.) Home Phone Work Phone Mobile Phone    " Kaylynn Sparks (Mother) [phone] -- --            Vital Signs (last day) before discharge     Date/Time Temp Temp src Pulse Resp BP Patient Position SpO2    11/15/21 0718 96.6 (35.9) Oral 57 16 102/65 Lying 99    11/15/21 0439 97.1 (36.2) Oral 60 16 92/51 Lying 98    11/14/21 1959 97.5 (36.4) Oral 63 18 111/71 Sitting 100    11/14/21 1624 98.5 (36.9) Oral 80 16 127/76 Lying 100    11/14/21 0700 97.5 (36.4) Oral 62 16 96/56 Lying 99    11/14/21 0458 97.1 (36.2) Oral 60 18 104/62 Lying 100          Oxygen Therapy (last day) before discharge     Date/Time SpO2 Device (Oxygen Therapy) Flow (L/min) Oxygen Concentration (%) ETCO2 (mmHg)    11/15/21 0834 -- room air -- -- --    11/15/21 0718 99 room air -- -- --    11/15/21 0439 98 room air -- -- --    11/14/21 2115 -- room air -- -- --    11/14/21 1959 100 room air -- -- --    11/14/21 1624 100 room air -- -- --    11/14/21 0846 -- room air -- -- --    11/14/21 0700 99 room air -- -- --    11/14/21 0458 100 room air -- -- --          Lines, Drains & Airways     Active LDAs     None          Inactive LDAs     Name Placement date Placement time Removal date Removal time Site Days    [REMOVED] Peripheral IV 11/11/21 0641 Distal; Posterior; Right Forearm 11/11/21  0641  11/13/21 2001  Forearm  2    [REMOVED] Peripheral IV 11/11/21 0903 Left Antecubital 11/11/21  0903  11/13/21 2002  Antecubital  2    [REMOVED] Peripheral IV 11/13/21 Posterior; Right Forearm 11/13/21  --  11/15/21  1555  Forearm  2                  Medication Administration Report for BridgerLamarilyn as of 11/16/21 1211   Legend:    Given Hold Not Given Due Canceled Entry Other Actions    Time Time (Time) Time  Time-Action       Discontinued     Completed     Future     MAR Hold     Linked           Medications 11/13/21 11/14/21 11/15/21   Completed Medications    gadobenate dimeglumine (MULTIHANCE) injection 15 mL  Dose: 15 mL  Freq: Once in Imaging Route: IV  Start: 11/15/21 1200   End:  11/15/21 1100   Admin Instructions:   Vesicant; admin as rapid bolus; flush with 5 mL NS after admin or 20 mL for renal or aortoiliofemoral vasculature         1100-Given [C]             ondansetron (ZOFRAN) injection 4 mg  Dose: 4 mg  Freq: Once Route: IV  Start: 11/11/21 0630   End: 11/11/21 0647          sodium chloride 0.9 % bolus 1,000 mL  Dose: 1,000 mL  Freq: Once Route: IV  Start: 11/11/21 0749   End: 11/11/21 0841          sodium chloride 0.9 % bolus 1,000 mL  Dose: 1,000 mL  Freq: Once Route: IV  Start: 11/11/21 0625   End: 11/11/21 0750         Discontinued Medications  Medications 11/13/21 11/14/21 11/15/21       enoxaparin (LOVENOX) syringe 40 mg  Dose: 40 mg  Freq: Every 24 Hours Route: SC  Indications of Use: PROPHYLAXIS OF VENOUS THROMBOEMBOLISM  Start: 11/12/21 0900   End: 11/15/21 2010   Admin Instructions:   Give subcutaneous in abdomen only. Do not massage site after injection.       0908-Given             0844-Given             0841-Given             heparin (porcine) 5000 UNIT/ML injection 5,000 Units  Dose: 5,000 Units  Freq: Every 8 Hours Scheduled Route: SC  Indications Comment: Prophylaxis of Venous Thromboembolism  Start: 11/12/21 0830   End: 11/12/21 0810          insulin glargine (LANTUS, SEMGLEE) injection 10 Units  Dose: 10 Units  Freq: Nightly Route: SC  Start: 11/14/21 2100   End: 11/15/21 0818   Admin Instructions:           2107-Given              insulin glargine (LANTUS, SEMGLEE) injection 12 Units  Dose: 12 Units  Freq: Nightly Route: SC  Start: 11/15/21 2100   End: 11/15/21 2010   Admin Instructions:             insulin glargine (LANTUS, SEMGLEE) injection 20 Units  Dose: 20 Units  Freq: Nightly Route: SC  Start: 11/13/21 2100   End: 11/14/21 0946   Admin Instructions:          2040-Given               insulin glargine (LANTUS, SEMGLEE) injection 30 Units  Dose: 30 Units  Freq: Nightly Route: SC  Start: 11/11/21 1900   End: 11/13/21 1620   Admin Instructions:              insulin lispro (ADMELOG) injection 0-7 Units  Dose: 0-7 Units  Freq: 3 Times Daily Before Meals Route: SC  Start: 11/14/21 1730   End: 11/15/21 2010   Admin Instructions:   Correction - Low Dose.  Less than 40 units/day total insulin dose or lean, elderly, renal patients    Blood glucose 150-199 mg/dL - 2 units  Blood glucose 200-249 mg/dL - 3 units  Blood glucose 250-299 mg/dL - 4 units  Blood glucose 300-349 mg/dL - 5 units  Blood glucose 350-400 mg/dL - 6 units  Blood glucose greater than 400 mg/dL - 7 units and call provider   Caution: Look alike/sound alike drug alert        1718-Given             0839-Given     1234-Given     1730           insulin lispro (ADMELOG) injection 0-9 Units  Dose: 0-9 Units  Freq: 3 Times Daily With Meals Route: SC  Start: 11/12/21 1800   End: 11/14/21 1608   Admin Instructions:   Correction - Moderate Dose.  40-60 units/day total insulin dose or average weight, on oral agents    Blood glucose 150-199 mg/dL - 2 units  Blood glucose 200-249 mg/dL - 4 units  Blood glucose 250-299 mg/dL - 6 units  Blood glucose 300-349 mg/dL - 7 units  Blood glucose 350-400 mg/dL - 8 units  Blood glucose greater than 400 mg/dL - 9 units and call provider   Caution: Look alike/sound alike drug alert       (0903)-Not Given [C]     1243-Given     (1743)-Not Given           0843-Given     (1141)-Not Given             insulin lispro (ADMELOG) injection 0-9 Units  Dose: 0-9 Units  Freq: 4 Times Daily With Meals & Nightly Route: SC  Start: 11/12/21 1200   End: 11/12/21 1536   Admin Instructions:   Correction - Moderate Dose.  40-60 units/day total insulin dose or average weight, on oral agents    Blood glucose 150-199 mg/dL - 2 units  Blood glucose 200-249 mg/dL - 4 units  Blood glucose 250-299 mg/dL - 6 units  Blood glucose 300-349 mg/dL - 7 units  Blood glucose 350-400 mg/dL - 8 units  Blood glucose greater than 400 mg/dL - 9 units and call provider   Caution: Look alike/sound alike drug alert           "insulin lispro (ADMELOG) injection 0-9 Units  Dose: 0-9 Units  Freq: Every 6 Hours Route: SC  Start: 11/11/21 1900   End: 11/12/21 1125   Admin Instructions:   Correction - Moderate Dose.  40-60 units/day total insulin dose or average weight, on oral agents    Blood glucose 150-199 mg/dL - 2 units  Blood glucose 200-249 mg/dL - 4 units  Blood glucose 250-299 mg/dL - 6 units  Blood glucose 300-349 mg/dL - 7 units  Blood glucose 350-400 mg/dL - 8 units  Blood glucose greater than 400 mg/dL - 9 units and call provider   Caution: Look alike/sound alike drug alert          insulin lispro (ADMELOG) injection 3 Units  Dose: 3 Units  Freq: 3 Times Daily With Meals Route: SC  Start: 11/13/21 1800   End: 11/15/21 2010   Admin Instructions:    \"Solution should be clear. If cloudy, contact pharmacy for a new vial.\"   Caution: Look alike/sound alike drug alert       (1745)-Not Given             0845-Given     (1141)-Not Given     1719-Given           0839-Given     1235-Given     1800           sennosides-docusate (PERICOLACE) 8.6-50 MG per tablet 2 tablet  Dose: 2 tablet  Freq: 2 Times Daily Route: PO  Start: 11/12/21 0900   End: 11/15/21 2010   Admin Instructions:   HOLD MEDICATION IF PATIENT HAS HAD BOWEL MOVEMENT. Start bowel management regimen if patient has not had a bowel movement after 12 hours.       (0911)-Not Given     (2003)-Not Given            (0845)-Not Given     (2145)-Not Given            (0843)-Not Given            And  polyethylene glycol (MIRALAX) packet 17 g  Dose: 17 g  Freq: Daily PRN Route: PO  PRN Reason: Constipation  PRN Comment: Use if senna-docusate is ineffective  Start: 11/12/21 0736   End: 11/15/21 2010   Admin Instructions:   Use if no bowel movement after 12 hours. Mix in 6-8 ounces of water.  Use 4-8 ounces of water, tea, or juice for each 17 gram dose.         And  bisacodyl (DULCOLAX) EC tablet 5 mg  Dose: 5 mg  Freq: Daily PRN Route: PO  PRN Reason: Constipation  PRN Comment: Use if " polyethylene glycol is ineffective  Start: 11/12/21 0736   End: 11/15/21 2010   Admin Instructions:   Use if no bowel movement after 12 hours.  Swallow whole. Do not crush, split, or chew tablet.         And  bisacodyl (DULCOLAX) suppository 10 mg  Dose: 10 mg  Freq: Daily PRN Route: RE  PRN Reason: Constipation  PRN Comment: Use if bisacodyl oral is ineffective  Start: 11/12/21 0736   End: 11/15/21 2010   Admin Instructions:   Use if no bowel movement after 12 hours.          sodium chloride 0.9 % bolus 2,000 mL  Dose: 2,000 mL  Freq: Once Route: IV  Start: 11/11/21 0806   End: 11/15/21 2010          sodium chloride 0.9 % flush 10 mL  Dose: 10 mL  Freq: Every 12 Hours Scheduled Route: IV  Start: 11/11/21 2100   End: 11/15/21 2010       0903-Canceled Entry     2003-Given            0845-Given     (2145)-Not Given            0842-Given             sodium chloride 0.9 % flush 10 mL  Dose: 10 mL  Freq: Every 12 Hours Scheduled Route: IV  Start: 11/12/21 0900   End: 11/15/21 2010       0904-Canceled Entry     2003-Canceled Entry [C]            0845-Canceled Entry     (2146)-Not Given            0843-Given             sodium chloride 0.9 % flush 3 mL  Dose: 3 mL  Freq: Every 12 Hours Scheduled Route: IV  Start: 11/11/21 0900   End: 11/15/21 2010       0903-Canceled Entry     2004-Canceled Entry [C]            0845-Canceled Entry     (2145)-Not Given            0843-Given             thiamine (B-1) 200 mg in sodium chloride 0.9 % 100 mL IVPB  Dose: 200 mg  Freq: Daily Route: IV  Start: 11/15/21 1330   End: 11/15/21 2010   Admin Instructions:   Protect from light.         (7242)-Not Given                  ,   Medication Administration Report for Raquel Sparks as of 11/16/21 1212   Legend:    Given Hold Not Given Due Canceled Entry Other Actions    Time Time (Time) Time  Time-Action       Discontinued     Completed     Future     MAR Hold     Linked           Medications 11/13/21 11/14/21 11/15/21   Discontinued  Medications    dextrose 5 % and sodium chloride 0.45 % infusion  Rate: 150 mL/hr Dose: 150 mL/hr  Freq: Continuous PRN Route: IV  PRN Comment: For Corrected Sodium >/= 135 and K > 5.5 and Glucose </= 200 for DKA  or Glucose </= 300 for HHS  Start: 11/11/21 0801   End: 11/11/21 1805          dextrose 5 % and sodium chloride 0.45 % with KCl 20 mEq/L infusion  Rate: 150 mL/hr Dose: 150 mL/hr  Freq: Continuous PRN Route: IV  PRN Comment: For Corrected Sodium >/=135 and K 3.3-5.5 and Glucose </= 200 for DKA or Glucose </= 300 for HHS  Start: 11/11/21 0801   End: 11/11/21 1805          dextrose 5 % and sodium chloride 0.45 % with KCl 40 mEq/L infusion  Rate: 150 mL/hr Dose: 150 mL/hr  Freq: Continuous PRN Route: IV  PRN Comment: For Corrected Sodium >/=135 and K <3.3 and Glucose </= 200 for DKA or Glucose </= 300 for HHS  Start: 11/11/21 0801   End: 11/11/21 1805          dextrose 5 % and sodium chloride 0.9 % infusion  Rate: 150 mL/hr Dose: 150 mL/hr  Freq: Continuous PRN Route: IV  PRN Comment: For Corrected Sodium <135 and K > 5.5 and Glucose </= 200 for DKA or Glucose </= 300 for HHS  Start: 11/11/21 0801   End: 11/11/21 1805          dextrose 5 % and sodium chloride 0.9 % with KCl 20 mEq/L infusion  Rate: 150 mL/hr Dose: 150 mL/hr  Freq: Continuous PRN Route: IV  PRN Comment: For Corrected Sodium <135 and K 3.3 - 5.5  and Glucose </= 200 for DKA or Glucose </= 300 for HHS  Start: 11/11/21 0801   End: 11/11/21 1805          dextrose 5 % and sodium chloride 0.9 % with KCl 40 mEq/L infusion  Rate: 150 mL/hr Dose: 150 mL/hr  Freq: Continuous PRN Route: IV  PRN Comment: For Corrected Sodium <135 and K <3.3 and Glucose </= 200 for DKA or Glucose </= 300 for HHS  Start: 11/11/21 0801   End: 11/11/21 1805          insulin regular 1 unit/mL in 0.9% sodium chloride  Rate: 6 mL/hr Dose: 6 Units/hr  Freq: Titrated Route: IV  Start: 11/11/21 0806   End: 11/11/21 1805   Admin Instructions:   Prior to starting Intravenous Insulin  Infusion: Notify provider if K < 3.3 for extra potassium orders, if indicated. Do not start insulin drip if K < 3.3.  FIRST STEP: Fixed Rate Intravenous Insulin Infusion. Refer to table in protocol to verify rate for insulin is correct for patient weight. If not, adjust accordingly.  If BG is not falling by at least 50 mg/dL per hour, then increase insulin 1 unit/hr to a maximum of 15 units/hr.      SECOND STEP: Variable Rate Intravenous Insulin Infusion. When BG is less than or equal to 200 (for DKA) or less than or equal to 300 (for HHS), DECREASE INTRAVENOUS INSULIN INFUSION DOSE BY HALF and adjust rate as follows:  BG < 100: Decrease dose by 1 unit/hr and give 25mL D50W intravenously.  -160: Decrease dose by 1 unit/hr  -220: No change in dose  -280: Increase dose by 1 unit/hr  BG > 280: Increase dose by 2 units/hr.    Once DKA or HHS resolves, call provider for transition orders from intravenous to SQ insulin. DO NOT DISCONTINUE INSULIN INFUSION FOR 1 HOUR AFTER FIRST DOSE OF LONG-ACTING SQ DOSE.  Prime the line with the drip then waste 20 mL prior to beginning infusion          sodium chloride 0.45 % 1,000 mL with potassium chloride 40 mEq infusion  Rate: 250 mL/hr Dose: 250 mL/hr  Freq: Continuous PRN Route: IV  PRN Comment: For Corrected Sodium >/=135 and K <3.3 and Glucose >200 for DKA or Glucose >300 for HHS  Start: 11/11/21 0801   End: 11/11/21 1805          sodium chloride 0.45 % infusion  Rate: 250 mL/hr Dose: 250 mL/hr  Freq: Continuous PRN Route: IV  PRN Comment: For Corrected Sodium >/= 135 and K >5.5 and Glucose >200 for DKA or Glucose >300 for HHS  Start: 11/11/21 0801   End: 11/11/21 1805          sodium chloride 0.45 % with KCl 20 mEq/L infusion  Rate: 250 mL/hr Dose: 250 mL/hr  Freq: Continuous PRN Route: IV  PRN Comment: For Corrected Sodium >/= 135 and K 3.3-5.5 and Glucose >200 for DKA or Glucose >300 for HHS  Start: 11/11/21 0801   End: 11/11/21 1805          sodium chloride  0.9 % infusion  Rate: 125 mL/hr Dose: 125 mL/hr  Freq: Continuous Route: IV  Start: 11/13/21 1730   End: 11/14/21 1748 1749-New Bag     1753-Stopped     2000-Restarted           0317-New Bag              sodium chloride 0.9 % infusion  Rate: 10 mL/hr Dose: 10 mL/hr  Freq: Continuous PRN Route: IV  PRN Comment: if insulin infusion rate is insufficient to maintain IV patency.  Start: 11/11/21 0801   End: 11/11/21 1805          sodium chloride 0.9 % infusion  Rate: 250 mL/hr Dose: 250 mL/hr  Freq: Continuous PRN Route: IV  PRN Comment: For Corrected Sodium < 135 and K>5.5 and Glucose >200 for DKA or Glucose >300 for HHS  Start: 11/11/21 0801   End: 11/11/21 1805          sodium chloride 0.9 % with KCl 20 mEq/L infusion  Rate: 250 mL/hr Dose: 250 mL/hr  Freq: Continuous PRN Route: IV  PRN Comment: For Corrected Sodium <135 and K 3.3-5.5 and Glucose >200 for DKA or Glucose >300 for HHS  Start: 11/11/21 0801   End: 11/11/21 1805          sodium chloride 0.9 % with KCl 40 mEq/L infusion  Rate: 250 mL/hr Dose: 250 mL/hr  Freq: Continuous PRN Route: IV  PRN Comment: For Corrected Sodium <135 and K <3.3 and Glucose >200 for DKA or Glucose >300 for HHS  Start: 11/11/21 0801   End: 11/11/21 1805                and   Medication Administration Report for Raquel Sparks as of 11/16/21 1212   Legend:    Given Hold Not Given Due Canceled Entry Other Actions    Time Time (Time) Time  Time-Action       Discontinued     Completed     Future     MAR Hold     Linked           Medications 11/13/21 11/14/21 11/15/21   Discontinued Medications    acetaminophen (TYLENOL) tablet 650 mg  Dose: 650 mg  Freq: Every 4 Hours PRN Route: PO  PRN Reason: Mild Pain   Start: 11/11/21 1806   End: 11/15/21 2010   Admin Instructions:   Do not exceed 4 grams of acetaminophen in a 24 hr period. Max dose of 2gm for AST/ALT greater than 120 units/L      If given for pain, use the following pain scale:   Mild Pain = Pain Score of 1-3, CPOT  1-2  Moderate Pain = Pain Score of 4-6, CPOT 3-4  Severe Pain = Pain Score of 7-10, CPOT 5-8         1234-Given            Or  acetaminophen (TYLENOL) 160 MG/5ML solution 650 mg  Dose: 650 mg  Freq: Every 4 Hours PRN Route: PO  PRN Reason: Mild Pain   Start: 11/11/21 1806   End: 11/15/21 2010   Admin Instructions:   Do not exceed 4 grams of acetaminophen in a 24 hr period. Max dose of 2gm for AST/ALT greater than 120 units/L      If given for pain, use the following pain scale:   Mild Pain = Pain Score of 1-3, CPOT 1-2  Moderate Pain = Pain Score of 4-6, CPOT 3-4  Severe Pain = Pain Score of 7-10, CPOT 5-8         1234-Not Given:  See Alt            Or  acetaminophen (TYLENOL) suppository 650 mg  Dose: 650 mg  Freq: Every 4 Hours PRN Route: RE  PRN Reason: Mild Pain   Start: 11/11/21 1806   End: 11/15/21 2010   Admin Instructions:   Do not exceed 4 grams of acetaminophen in a 24 hr period. Max dose of 2gm for AST/ALT greater than 120 units/L      If given for pain, use the following pain scale:   Mild Pain = Pain Score of 1-3, CPOT 1-2  Moderate Pain = Pain Score of 4-6, CPOT 3-4  Severe Pain = Pain Score of 7-10, CPOT 5-8         1234-Not Given:  See Alt             acetaminophen (TYLENOL) tablet 650 mg  Dose: 650 mg  Freq: Every 4 Hours PRN Route: PO  PRN Reason: Fever  PRN Comment: fever greater than 101.5 F or headache  Start: 11/12/21 0736   End: 11/15/21 2010   Admin Instructions:   Do not exceed 4 grams of acetaminophen in a 24 hr period. Max dose of 2gm for AST/ALT greater than 120 units/L      If given for pain, use the following pain scale:   Mild Pain = Pain Score of 1-3, CPOT 1-2  Moderate Pain = Pain Score of 4-6, CPOT 3-4  Severe Pain = Pain Score of 7-10, CPOT 5-8         Or  acetaminophen (TYLENOL) suppository 650 mg  Dose: 650 mg  Freq: Every 4 Hours PRN Route: RE  PRN Reason: Fever  PRN Comment: temperature greater than 101.5 F or headache  Start: 11/12/21 0736   End: 11/15/21 2010   Admin  Instructions:   Do not exceed 4 grams of acetaminophen in a 24 hr period. Max dose of 2gm for AST/ALT greater than 120 units/L      If given for pain, use the following pain scale:   Mild Pain = Pain Score of 1-3, CPOT 1-2  Moderate Pain = Pain Score of 4-6, CPOT 3-4  Severe Pain = Pain Score of 7-10, CPOT 5-8          sennosides-docusate (PERICOLACE) 8.6-50 MG per tablet 2 tablet  Dose: 2 tablet  Freq: 2 Times Daily Route: PO  Start: 11/12/21 0900   End: 11/15/21 2010   Admin Instructions:   HOLD MEDICATION IF PATIENT HAS HAD BOWEL MOVEMENT. Start bowel management regimen if patient has not had a bowel movement after 12 hours.       (0911)-Not Given     (2003)-Not Given            (0845)-Not Given     (2145)-Not Given            (0843)-Not Given            And  polyethylene glycol (MIRALAX) packet 17 g  Dose: 17 g  Freq: Daily PRN Route: PO  PRN Reason: Constipation  PRN Comment: Use if senna-docusate is ineffective  Start: 11/12/21 0736   End: 11/15/21 2010   Admin Instructions:   Use if no bowel movement after 12 hours. Mix in 6-8 ounces of water.  Use 4-8 ounces of water, tea, or juice for each 17 gram dose.         And  bisacodyl (DULCOLAX) EC tablet 5 mg  Dose: 5 mg  Freq: Daily PRN Route: PO  PRN Reason: Constipation  PRN Comment: Use if polyethylene glycol is ineffective  Start: 11/12/21 0736   End: 11/15/21 2010   Admin Instructions:   Use if no bowel movement after 12 hours.  Swallow whole. Do not crush, split, or chew tablet.         And  bisacodyl (DULCOLAX) suppository 10 mg  Dose: 10 mg  Freq: Daily PRN Route: RE  PRN Reason: Constipation  PRN Comment: Use if bisacodyl oral is ineffective  Start: 11/12/21 0736   End: 11/15/21 2010   Admin Instructions:   Use if no bowel movement after 12 hours.          calcium carbonate (TUMS) chewable tablet 500 mg (200 mg elemental)  Dose: 2 tablet  Freq: 2 Times Daily PRN Route: PO  PRN Reason: Heartburn  Start: 11/11/21 1806   End: 11/15/21 2010   Admin  Instructions:   One tablet contains 200 mg elemental calcium.  Take with food.          calcium gluconate 1g/50ml 0.675% NaCl IV SOLN  Dose: 1 g  Freq: As Needed Route: IV  PRN Comment: per protocol in admin instructions  Start: 11/11/21 1807   End: 11/15/21 2010   Admin Instructions:   Calcium replacement: If corrected Ca < 7.5 mg/dL: Give Calcium Gluconate 1gm/100mL NS IV over 1 hour Then Calcium Gluconate 6gm/500mL NS IV over 6 hours Corrected calcium = serum calcium + 0.8 (4 - serum albumin) Recheck Ca 6 hours after infusion complete. If corrected Ca < 7.5 mg/dL, contact physician.         And  calcium gluconate 6 g in sodium chloride 0.9 % 500 mL IVPB  Dose: 6 g  Freq: As Needed Route: IV  PRN Comment: per protocol in admin instructions  Start: 11/11/21 1807   End: 11/15/21 2010   Admin Instructions:   Calcium replacement: If corrected Ca < 7.5 mg/dL: Give Calcium Gluconate 1gm/100mL NS IV over 1 hour Then Calcium Gluconate 6gm/500mL NS IV over 6 hours Corrected calcium = serum calcium + 0.8 (4 - serum albumin) Recheck Ca 6 hours after infusion complete. If corrected Ca < 7.5 mg/dL, contact physician.          dextrose (D50W) (25 g/50 mL) IV injection 12.5 g  Dose: 12.5 g  Freq: As Needed Route: IV  PRN Reason: Low Blood Sugar  PRN Comment: for blood glucose < 100 mg/dL  Start: 11/11/21 0801   End: 11/11/21 1805          dextrose (D50W) (25 g/50 mL) IV injection 25 g  Dose: 25 g  Freq: Every 15 Minutes PRN Route: IV  PRN Reason: Low Blood Sugar  PRN Comment: Blood Sugar Less Than 70  Start: 11/11/21 1806   End: 11/15/21 2010   Admin Instructions:   Blood sugar less than 70; patient has IV access - Unresponsive, NPO or Unable To Safely Swallow          dextrose (GLUTOSE) oral gel 15 g  Dose: 15 g  Freq: Every 15 Minutes PRN Route: PO  PRN Reason: Low Blood Sugar  PRN Comment: Blood sugar less than 70  Start: 11/11/21 1806   End: 11/15/21 2010   Admin Instructions:   BS<70, Patient Alert, Is not NPO, Can safely  swallow.          dextrose 5 % and sodium chloride 0.45 % infusion  Rate: 150 mL/hr Dose: 150 mL/hr  Freq: Continuous PRN Route: IV  PRN Comment: For Corrected Sodium >/= 135 and K > 5.5 and Glucose </= 200 for DKA  or Glucose </= 300 for HHS  Start: 11/11/21 0801   End: 11/11/21 1805          dextrose 5 % and sodium chloride 0.45 % with KCl 20 mEq/L infusion  Rate: 150 mL/hr Dose: 150 mL/hr  Freq: Continuous PRN Route: IV  PRN Comment: For Corrected Sodium >/=135 and K 3.3-5.5 and Glucose </= 200 for DKA or Glucose </= 300 for HHS  Start: 11/11/21 0801   End: 11/11/21 1805          dextrose 5 % and sodium chloride 0.45 % with KCl 40 mEq/L infusion  Rate: 150 mL/hr Dose: 150 mL/hr  Freq: Continuous PRN Route: IV  PRN Comment: For Corrected Sodium >/=135 and K <3.3 and Glucose </= 200 for DKA or Glucose </= 300 for HHS  Start: 11/11/21 0801   End: 11/11/21 1805          dextrose 5 % and sodium chloride 0.9 % infusion  Rate: 150 mL/hr Dose: 150 mL/hr  Freq: Continuous PRN Route: IV  PRN Comment: For Corrected Sodium <135 and K > 5.5 and Glucose </= 200 for DKA or Glucose </= 300 for HHS  Start: 11/11/21 0801   End: 11/11/21 1805          dextrose 5 % and sodium chloride 0.9 % with KCl 20 mEq/L infusion  Rate: 150 mL/hr Dose: 150 mL/hr  Freq: Continuous PRN Route: IV  PRN Comment: For Corrected Sodium <135 and K 3.3 - 5.5  and Glucose </= 200 for DKA or Glucose </= 300 for HHS  Start: 11/11/21 0801   End: 11/11/21 1805          dextrose 5 % and sodium chloride 0.9 % with KCl 40 mEq/L infusion  Rate: 150 mL/hr Dose: 150 mL/hr  Freq: Continuous PRN Route: IV  PRN Comment: For Corrected Sodium <135 and K <3.3 and Glucose </= 200 for DKA or Glucose </= 300 for HHS  Start: 11/11/21 0801   End: 11/11/21 1805          diphenhydrAMINE (BENADRYL) injection 25 mg  Dose: 25 mg  Freq: Every 6 Hours PRN Route: IV  PRN Reason: Itching  Start: 11/14/21 1939   End: 11/15/21 2010   Admin Instructions:   25 mg may be given IV push over  less than 1 minute.  Caution: Look alike/sound alike drug alert. This med may be ordered in other forms and routes. Before giving verify the last time the drug was given by any route/form.          2115-Given             0533-Given     1404-Given            glucagon (human recombinant) (GLUCAGEN DIAGNOSTIC) injection 1 mg  Dose: 1 mg  Freq: Every 15 Minutes PRN Route: SC  PRN Reason: Low Blood Sugar  PRN Comment: Blood Glucose Less Than 70  Start: 11/11/21 1806   End: 11/15/21 2010   Admin Instructions:   Blood Glucose Less Than 70 - Patient Without IV Access - Unresponsive, NPO or Unable To Safely Swallow          HYDROcodone-acetaminophen (NORCO) 5-325 MG per tablet 1 tablet  Dose: 1 tablet  Freq: Every 4 Hours PRN Route: PO  PRN Reason: Moderate Pain   Start: 11/11/21 1806   End: 11/15/21 2010   Admin Instructions:   [ISIDRO]    Do not exceed 4 grams of acetaminophen in a 24 hr period. Max dose of 2gm for AST/ALT greater than 120 units/L        If given for pain, use the following pain scale:   Mild Pain = Pain Score of 1-3, CPOT 1-2  Moderate Pain = Pain Score of 4-6, CPOT 3-4  Severe Pain = Pain Score of 7-10, CPOT 5-8          Magnesium Sulfate 2 gram Bolus, followed by 8 gram infusion (total Mg dose 10 grams)- Mg less than or equal to 1mg/dL  Dose: 2 g  Freq: As Needed Route: IV  PRN Comment: See Administration Instructions  Start: 11/11/21 1807   End: 11/15/21 2010   Admin Instructions:   Mg less than or equal to 1mg/dL. Give 2 gm over 30 minutes as bolus, then infuse 2 gm over 2 hours for 4 doses (8 grams) for total dose of 10 grams.  Recheck Mg levels in the AM.        1231-Not Given:  See Alt             1235-Not Given:  See Alt            Or  Magnesium Sulfate 2 gram / 50mL Infusion (GIVE X 3 BAGS TO EQUAL 6GM TOTAL DOSE) - Mg 1.1 - 1.5 mg/dl  Dose: 2 g  Freq: As Needed Route: IV  PRN Comment: See Administration Instructions  Start: 11/11/21 1807   End: 11/15/21 2010   Admin Instructions:   Mg 1.1 -1.5  mg/dL. Infuse 2 grams over 2 hours for 3 doses (for a total Mg dose of 6 grams).  Recheck Mg level in the AM.        1231-New Bag             1235-Not Given:  See Alt            Or  Magnesium Sulfate 4 gram infusion- Mg 1.6-1.9 mg/dL  Dose: 4 g  Freq: As Needed Route: IV  PRN Comment: See Administration Instructions  Start: 11/11/21 1807   End: 11/15/21 2010   Admin Instructions:   Mg 1.6-1.9 mg/dL. Recheck Mg level in the AM.        1231-Not Given:  See Alt             1235-New Bag             melatonin tablet 5 mg  Dose: 5 mg  Freq: Nightly PRN Route: PO  PRN Reason: Sleep  Start: 11/11/21 1807   End: 11/15/21 2010          ondansetron (ZOFRAN) tablet 4 mg  Dose: 4 mg  Freq: Every 6 Hours PRN Route: PO  PRN Reasons: Nausea,Vomiting  Start: 11/11/21 1807   End: 11/12/21 0744   Admin Instructions:   If BOTH ondansetron (ZOFRAN) and promethazine (PHENERGAN) are ordered use ondansetron first and THEN promethazine IF ondansetron is ineffective.         Or  ondansetron (ZOFRAN) injection 4 mg  Dose: 4 mg  Freq: Every 6 Hours PRN Route: IV  PRN Reasons: Nausea,Vomiting  Start: 11/11/21 1807   End: 11/12/21 0744   Admin Instructions:   If BOTH ondansetron (ZOFRAN) and promethazine (PHENERGAN) are ordered use ondansetron first and THEN promethazine IF ondansetron is ineffective.          ondansetron (ZOFRAN) tablet 4 mg  Dose: 4 mg  Freq: Every 6 Hours PRN Route: PO  PRN Reasons: Nausea,Vomiting  Start: 11/12/21 0736   End: 11/15/21 2010   Admin Instructions:   If BOTH ondansetron (ZOFRAN) & promethazine (PHENERGAN) Ordered, Use ondansetron First & THEN promethazine IF ondansetron Ineffective.         Or  ondansetron (ZOFRAN) injection 4 mg  Dose: 4 mg  Freq: Every 6 Hours PRN Route: IV  PRN Reasons: Nausea,Vomiting  Start: 11/12/21 0736   End: 11/15/21 2010   Admin Instructions:   If BOTH ondansetron (ZOFRAN) & promethazine (PHENERGAN) Ordered, Use ondansetron First & THEN promethazine IF ondansetron Ineffective.           potassium & sodium phosphates (PHOS-NAK) 280-160-250 MG packet - for Phosphorus less than 1.25 mg/dL  Dose: 2 packet  Freq: Every 6 Hours PRN Route: PO  PRN Comment: Phosphorus less than 1.25 mg/dL  Start: 11/11/21 1807   End: 11/15/21 2010   Admin Instructions:   Phosphorus replacement:     If Phos    < 1.25 mg/dL   : Give Neutraphos 2 packets by mouth every 6 hours x 2 doses. Recheck Phosphorus level 6 hours after replacement complete.       0116-Not Given:  See Alt     2034-Not Given:  See Alt             Or  potassium & sodium phosphates (PHOS-NAK) 280-160-250 MG packet - for Phosphorus 1.25 - 2.5 mg/dL  Dose: 2 packet  Freq: Every 6 Hours PRN Route: PO  PRN Comment: Phosphorus 1.25 - 2.5 mg/dL  Start: 11/11/21 1807   End: 11/15/21 2010   Admin Instructions:   Phosphorus replacement:       If Phos 1.25 - 2.5  mg/dL: Give Neutraphos 2 packets by mouth every 6 hours x 1 dose. Recheck Phosphorus level 6 hours after replacement complete.       0116-Given     2034-Given              potassium chloride (K-DUR,KLOR-CON) ER tablet 40 mEq  Dose: 40 mEq  Freq: As Needed Route: PO  PRN Comment: Potassium Replacement.  See Admin Instructions  Start: 11/11/21 1807   End: 11/15/21 2010   Admin Instructions:   Potassium 3.1 or Less Give KCl 40 mEq q4h x3 Doses   Potassium 3.2 - 3.6 Give KCl 40 mEq q4h x2 Doses     Check Potassium 4 Hours After Last Dose Given   Check Magnesium if Potassium Level Remains Low After Replacement   DO NOT GIVE if CrCl is Less Than 30 mL/minute or Urine Output Less Than 30 mL/hr  Swallow whole; do not crush, split, or chew.       0117-Given     0537-Given     1025-Given       1452-Given               potassium chloride (KLOR-CON) packet 40 mEq  Dose: 40 mEq  Freq: As Needed Route: PO  PRN Comment: Potassium Replacement, See Admin Instructions  Start: 11/11/21 1807   End: 11/15/21 2010   Admin Instructions:   Potassium 3.1 or Less Give KCl 40 mEq q4h x3 Doses   Potassium 3.2 - 3.6 Give KCl 40 mEq q4h  x2 Doses     Check Potassium 4 Hours After Last Dose Given   Check Magnesium if Potassium Level Remains Low After Replacement   DO NOT GIVE if CrCl is Less Than 30 mL/minute or Urine Output Less Than 30 mL/hr          sodium chloride 0.45 % 1,000 mL with potassium chloride 40 mEq infusion  Rate: 250 mL/hr Dose: 250 mL/hr  Freq: Continuous PRN Route: IV  PRN Comment: For Corrected Sodium >/=135 and K <3.3 and Glucose >200 for DKA or Glucose >300 for HHS  Start: 11/11/21 0801   End: 11/11/21 1805          sodium chloride 0.45 % infusion  Rate: 250 mL/hr Dose: 250 mL/hr  Freq: Continuous PRN Route: IV  PRN Comment: For Corrected Sodium >/= 135 and K >5.5 and Glucose >200 for DKA or Glucose >300 for HHS  Start: 11/11/21 0801   End: 11/11/21 1805          sodium chloride 0.45 % with KCl 20 mEq/L infusion  Rate: 250 mL/hr Dose: 250 mL/hr  Freq: Continuous PRN Route: IV  PRN Comment: For Corrected Sodium >/= 135 and K 3.3-5.5 and Glucose >200 for DKA or Glucose >300 for HHS  Start: 11/11/21 0801   End: 11/11/21 1805          sodium chloride 0.9 % flush 10 mL  Dose: 10 mL  Freq: As Needed Route: IV  PRN Reason: Line Care  Start: 11/11/21 1806   End: 11/15/21 2010          sodium chloride 0.9 % flush 10 mL  Dose: 10 mL  Freq: As Needed Route: IV  PRN Reason: Line Care  Start: 11/12/21 0736   End: 11/15/21 2010          sodium chloride 0.9 % flush 10 mL  Dose: 10 mL  Freq: Once As Needed Route: IV  PRN Reason: Line Care  Start: 11/11/21 0801   End: 11/11/21 1805   Admin Instructions:   Flush approximately 10 mL through line prior to first use of tubing.          sodium chloride 0.9 % flush 10 mL  Dose: 10 mL  Freq: As Needed Route: IV  PRN Reason: Line Care  Start: 11/11/21 0801   End: 11/11/21 1805          sodium chloride 0.9 % infusion  Rate: 10 mL/hr Dose: 10 mL/hr  Freq: Continuous PRN Route: IV  PRN Comment: if insulin infusion rate is insufficient to maintain IV patency.  Start: 11/11/21 0801   End: 11/11/21 1801           sodium chloride 0.9 % infusion  Rate: 250 mL/hr Dose: 250 mL/hr  Freq: Continuous PRN Route: IV  PRN Comment: For Corrected Sodium < 135 and K>5.5 and Glucose >200 for DKA or Glucose >300 for HHS  Start: 11/11/21 0801   End: 11/11/21 1805          sodium chloride 0.9 % with KCl 20 mEq/L infusion  Rate: 250 mL/hr Dose: 250 mL/hr  Freq: Continuous PRN Route: IV  PRN Comment: For Corrected Sodium <135 and K 3.3-5.5 and Glucose >200 for DKA or Glucose >300 for HHS  Start: 11/11/21 0801   End: 11/11/21 1805          sodium chloride 0.9 % with KCl 40 mEq/L infusion  Rate: 250 mL/hr Dose: 250 mL/hr  Freq: Continuous PRN Route: IV  PRN Comment: For Corrected Sodium <135 and K <3.3 and Glucose >200 for DKA or Glucose >300 for HHS  Start: 11/11/21 0801   End: 11/11/21 1805                      Physician Progress Notes       Stephanie Daniel APRN at 11/15/21 1531     Attestation signed by Won Ford MD at 11/15/21 2788    I have reviewed this documentation and agree.                      [facility] PROGRESS NOTE    PATIENT IDENTIFICATION:   Name:  Raquel Sparks      MRN:  [id number]     21 y.o.  male               CC: Blurred vision in left eye      Subjective     Interval History:  Feels pretty good. States that the blurred vision in his left eye comes and goes. No other new complaints at this time. Mother is at bedside.     ROS:  Constitutional: No fever, chills  HEENT: No headache, no tinnitus, no hearing difficulties  GI: No nausea, vomiting, no swallow difficulties  Neuro: No numbness, tingling, or weakness, no speech difficulties, no balance difficulties    Objective     Vital signs in last 24 hours:  Temp:  [96.6 °F (35.9 °C)-98.5 °F (36.9 °C)] 96.6 °F (35.9 °C)  Heart Rate:  [57-80] 57  Resp:  [16-18] 16  BP: ()/(51-76) 102/65      Intake/Output this shift:  I/O this shift:  In: 580 [P.O.:580]  Out: -       Intake/Output last 3 shifts:  I/O last 3 completed shifts:  In: 1278.8  [P.O.:360; I.V.:918.8]  Out: -     LABS:  Results from last 7 days   Lab Units 11/15/21  0721 11/14/21  0839 11/13/21  0610   WBC 10*3/mm3 4.07 4.16 5.82   HEMOGLOBIN g/dL 12.3* 11.9* 12.9*   HEMATOCRIT % 36.4* 33.6* 38.9   PLATELETS 10*3/mm3 159 160 180     Results from last 7 days   Lab Units 11/15/21  0721 11/14/21  0839 11/13/21  0610 11/11/21  0826 11/11/21  0640   SODIUM mmol/L 139 140 143   < > 139   POTASSIUM mmol/L 3.7 3.5 3.1*   < > 3.7   CHLORIDE mmol/L 105 108* 110*   < > 107   CO2 mmol/L 26.7 24.6 24.9   < > 12.0*   BUN mg/dL 13 10 10   < > 14   CREATININE mg/dL 1.02 0.84 0.91   < > 1.31*   CALCIUM mg/dL 7.9* 7.9* 8.4*   < > 9.7   BILIRUBIN mg/dL  --   --   --   --  0.3   ALK PHOS U/L  --   --   --   --  101   ALT (SGPT) U/L  --   --   --   --  26   AST (SGOT) U/L  --   --   --   --  13   GLUCOSE mg/dL 280* 227* 52*   < > 317*    < > = values in this interval not displayed.          IMAGING STUDIES:  MRI PITUITARY W/ AND W/O CONTRAST 11/15-  Localized widening of the left side of the sella turcica containing CSF consistent with a small arachnoid cyst or simply a partially empty sella. Otherwise unremarkable MRI of the brain with/to the pituitary gland.    I personally viewed and interpreted the patient's clinical data and MRI pituitary results and discussed the results with Dr. Ford who also viewed and interpreted the MRI.    Meds reviewed/changed: Yes    Scheduled Meds:enoxaparin, 40 mg, Subcutaneous, Q24H  insulin glargine, 12 Units, Subcutaneous, Nightly  insulin lispro, 0-7 Units, Subcutaneous, TID AC  insulin lispro, 3 Units, Subcutaneous, TID With Meals  senna-docusate sodium, 2 tablet, Oral, BID  sodium chloride, 2,000 mL, Intravenous, Once  sodium chloride, 10 mL, Intravenous, Q12H  sodium chloride, 10 mL, Intravenous, Q12H  sodium chloride, 3 mL, Intravenous, Q12H  thiamine (VITAMIN B1) IVPB, 200 mg, Intravenous, Daily      Continuous Infusions:   PRN Meds:.•  acetaminophen **OR**  acetaminophen **OR** acetaminophen  •  acetaminophen **OR** acetaminophen  •  senna-docusate sodium **AND** polyethylene glycol **AND** bisacodyl **AND** bisacodyl  •  calcium carbonate  •  Calcium Gluconate-NaCl **AND** calcium gluconate IVPB **AND** Calcium  •  dextrose  •  dextrose  •  diphenhydrAMINE  •  glucagon (human recombinant)  •  HYDROcodone-acetaminophen  •  magnesium sulfate **OR** magnesium sulfate **OR** magnesium sulfate  •  melatonin  •  ondansetron **OR** ondansetron  •  potassium & sodium phosphates **OR** potassium & sodium phosphates  •  potassium chloride  •  potassium chloride  •  sodium chloride  •  sodium chloride      Physical Exam:    General:   Awake, alert, oriented x3. Speech clear with no aphasia    CN III IV VI: Extraocular movements are full , PERRL. Blind in the right eye. Reports intermittent blurriness in left eye.  CN V: Normal facial sensation and strength of muscles of mastication.  CN VII: Facial movements are symmetric. No weakness.      Motor: Normal muscle strength, bulk and tone in upper and lower extremities.    Sensation: Normal to light touch; no extinction  Station and Gait: Normal gait and station.    Coordination:  Finger-to-nose test shows no dysmetria.    Extremities:   Wearing SCDs    Assessment/Plan     ASSESSMENT:      Diabetic ketoacidosis without coma associated with type 1 diabetes mellitus (HCC)    Hypomagnesemia    Anemia    Abnormal MRI    20 y/o male patient who presented with DKA and noticed some blurring in his left eye. Brain MRI revealed a cystic structure abutting the left margin of the pituitary. Further imaging with pituitary MRI showed a localized area of widening of the left side of the sella turcica containing CSF consistent with a small arachnoid cyst or simply a partially empty sella.     On exam, the patient states that the left eye blurry vision is intermittent, however no other focal deficits noted.   The left eye blurriness is more than  "likely related to his newly diagnosed Type 1 DM as there was nothing on the pituitary MRI to suggest a pituitary mass or compression of the optic nerves. We recommend the patient following up with an ophthalmologist on an outpatient basis for visual field testing. At this time, there is nothing further from a neurosurgical standpoint and we will sign off but will remain available for any questions or concerns. Would like for the patient to follow-up with us in about 8 weeks once he has seen the ophthalmologist.    PLAN:   Recommend follow-up with ophthalmologist on an outpatient basis for visual field testing  Would like for the patient to follow-up with us in about 8 weeks once he has seen the ophthalmologist      I discussed the patient's findings and my recommendations with patient, family, nursing staff and Dr. Ford       LOS: 4 days       Stephanie Daniel, MONE  11/15/2021  15:31 EST    \"Dictated utilizing Dragon dictation\".      Electronically signed by Won Ford MD at 11/15/21 1969     Peter Pablo MD at 11/15/21 2114            Patient Identification:  NAME:  Raquel Sparks  Age:  21 y.o.   Sex:  male   :  2000   MRN:  [id number]       Chief complaint: Pituitary mass, blurred vision left eye, newly diagnosed type 1 diabetes    History of present illness: He feels like he is doing well today really has no new complaints he feels like his vision in the left eye is good now but he thought it got blurry when he was getting rolled downstairs for the MRI scan.  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  Context is a patient who has newly diagnosed type 1 diabetes mellitus    Past medical history:  Past Medical History:   Diagnosis Date   • ADHD    • Asthma    • Diabetes mellitus (HCC)    • Visual impairment     blind in right eye       Allergies:  Strawberry, Strawberry (diagnostic), and [place] extract    Home medications:  Medications Prior to " Admission   Medication Sig Dispense Refill Last Dose   • metFORMIN (GLUCOPHAGE) 500 MG tablet Take 500 mg by mouth 2 (Two) Times a Day With Meals.      • acetaminophen (TYLENOL) 500 MG tablet Take 500 mg by mouth.      • ibuprofen (ADVIL,MOTRIN) 400 MG tablet Take 400 mg by mouth.           Hospital medications:  enoxaparin, 40 mg, Subcutaneous, Q24H  insulin glargine, 12 Units, Subcutaneous, Nightly  insulin lispro, 0-7 Units, Subcutaneous, TID AC  insulin lispro, 3 Units, Subcutaneous, TID With Meals  senna-docusate sodium, 2 tablet, Oral, BID  sodium chloride, 2,000 mL, Intravenous, Once  sodium chloride, 10 mL, Intravenous, Q12H  sodium chloride, 10 mL, Intravenous, Q12H  sodium chloride, 3 mL, Intravenous, Q12H         •  acetaminophen **OR** acetaminophen **OR** acetaminophen  •  acetaminophen **OR** acetaminophen  •  senna-docusate sodium **AND** polyethylene glycol **AND** bisacodyl **AND** bisacodyl  •  calcium carbonate  •  Calcium Gluconate-NaCl **AND** calcium gluconate IVPB **AND** Calcium  •  dextrose  •  dextrose  •  diphenhydrAMINE  •  glucagon (human recombinant)  •  HYDROcodone-acetaminophen  •  magnesium sulfate **OR** magnesium sulfate **OR** magnesium sulfate  •  melatonin  •  ondansetron **OR** ondansetron  •  potassium & sodium phosphates **OR** potassium & sodium phosphates  •  potassium chloride  •  potassium chloride  •  sodium chloride  •  sodium chloride      Objective:  Vitals Ranges:   Temp:  [96.6 °F (35.9 °C)-98.5 °F (36.9 °C)] 96.6 °F (35.9 °C)  Heart Rate:  [57-80] 57  Resp:  [16-18] 16  BP: ()/(51-76) 102/65      Physical Exam:  Patient is awake and alert has conjugate gaze as rest but he becomes obvious his right eye is blind and has decreased extraocular movements compared to the left which is normal left pupil constricting 2-1/2-2 vision up close and far away is normal in the left eye and visual fields are normal in the left eye again he is blind in the right eye normal  remainder of the cranial nerves II through VII tongue is midline excellent motor x4 extremities reflexes trace throughout symmetrical toes downgoing bilaterally    Results review:   I reviewed the patient's new clinical results.    Data review:  Lab Results (last 24 hours)     Procedure Component Value Units Date/Time    FSH & LH [[id number]] Collected: 11/15/21 0721    Specimen: Blood Updated: 11/15/21 1057     FSH 0.81 mIU/mL      LH 6.71 mIU/mL     Narrative:      FSH Reference Ranges:    Adult Males:  1.5-12.4 mIU/mL    Adult Females:   Folicular Phase  3.5-12.5 mIU/ml   Ovulation Phase  4.7-21.5 mIU/ml   Lutal Phase      1.7-7.7 mIU/ml   Postmenopausal   25.8-134.8 mIU/ml     LH Reference Ranges:    Adult Males:  1.7-8.6 mIU/ml    Adult Females:  Folicular Phase  2.4-12.6 mIU/ml   Ovulation Phase  14.0-95.6 mIU/ml   Lutal Phase      1.0-11.4 mIU/ml   Postmenopausal   7.7-58.5 mIU/ml    Results may be falsely decreased if patient taking Biotin.      Prolactin [[id number]]  (Normal) Collected: 11/15/21 0721    Specimen: Blood Updated: 11/15/21 1057     Prolactin 14.30 ng/mL     Narrative:      Results may be falsely decreased if patient taking Biotin.     Manual Differential [[id number]]  (Abnormal) Collected: 11/15/21 0721    Specimen: Blood Updated: 11/15/21 0946     Neutrophil % 33.0 %      Lymphocyte % 59.3 %      Monocyte % 3.3 %      Eosinophil % 3.3 %      Atypical Lymphocyte % 1.1 %      Neutrophils Absolute 1.34 10*3/mm3      Lymphocytes Absolute 2.46 10*3/mm3      Monocytes Absolute 0.13 10*3/mm3      Eosinophils Absolute 0.13 10*3/mm3      RBC Morphology Normal     WBC Morphology Normal     Platelet Morphology Normal    CBC & Differential [[id number]]  (Abnormal) Collected: 11/15/21 0721    Specimen: Blood Updated: 11/15/21 0946    Narrative:      The following orders were created for panel order CBC & Differential.  Procedure                               Abnormality         Status                      ---------                               -----------         ------                     CBC Auto Differential[[id number]]        Abnormal            Final result                 Please view results for these tests on the individual orders.    CBC Auto Differential [[id number]]  (Abnormal) Collected: 11/15/21 0721    Specimen: Blood Updated: 11/15/21 0946     WBC 4.07 10*3/mm3      RBC 4.11 10*6/mm3      Hemoglobin 12.3 g/dL      Hematocrit 36.4 %      MCV 88.6 fL      MCH 29.9 pg      MCHC 33.8 g/dL      RDW 14.1 %      RDW-SD 45.0 fl      MPV 11.6 fL      Platelets 159 10*3/mm3     Cortisol [[id number]] Collected: 11/15/21 0721    Specimen: Blood Updated: 11/15/21 0912     Cortisol 3.17 mcg/dL     Narrative:      Cortisol Reference Ranges:    Cortisol 6AM - 10AM Range: 6.02-18.40 mcg/dl  Cortisol 4PM - 8PM Range: 2.68-10.50 mcg/dl      Results may be falsely increased if patient taking Biotin.      Renal Function Panel [[id number]]  (Abnormal) Collected: 11/15/21 0721    Specimen: Blood Updated: 11/15/21 0910     Glucose 280 mg/dL      BUN 13 mg/dL      Creatinine 1.02 mg/dL      Sodium 139 mmol/L      Potassium 3.7 mmol/L      Chloride 105 mmol/L      CO2 26.7 mmol/L      Calcium 7.9 mg/dL      Albumin 2.90 g/dL      Phosphorus 3.9 mg/dL      Anion Gap 7.3 mmol/L      BUN/Creatinine Ratio 12.7     eGFR  African Amer 110 mL/min/1.73     Narrative:      GFR Normal >60  Chronic Kidney Disease <60  Kidney Failure <15      Magnesium [[id number]]  (Normal) Collected: 11/15/21 0721    Specimen: Blood Updated: 11/15/21 0910     Magnesium 1.8 mg/dL     Glutamic Acid Decarboxylase [[id number]] Collected: 11/15/21 0721    Specimen: Blood Updated: 11/15/21 0819    Anti-islet Cell Antibody [[id number]] Collected: 11/15/21 0721    Specimen: Blood Updated: 11/15/21 0819    ACTH [[id number]] Collected: 11/15/21 0721    Specimen: Blood Updated: 11/15/21 0817    POC Glucose Once [[id number]]  (Abnormal) Collected: 11/15/21 0720     Specimen: Blood Updated: 11/15/21 0722     Glucose 251 mg/dL      Comment: Meter: [id number] : [id number] Roel CARDENAS       POC Glucose Once [[id number]]  (Abnormal) Collected: 11/14/21 2003    Specimen: Blood Updated: 11/14/21 2004     Glucose 232 mg/dL      Comment: Meter: [id number] : [id number] Judah Shawna NA       POC Glucose Once [[id number]]  (Abnormal) Collected: 11/14/21 1739    Specimen: Blood Updated: 11/14/21 1741     Glucose 340 mg/dL      Comment: Meter: [id number] : [id number] Dorothea CARDENAS       POC Glucose Once [[id number]]  (Abnormal) Collected: 11/14/21 1623    Specimen: Blood Updated: 11/14/21 1625     Glucose 313 mg/dL      Comment: Meter: [id number] : [id number] Dorothea CARDENAS       T4, Free [[id number]]  (Normal) Collected: 11/14/21 0839    Specimen: Blood Updated: 11/14/21 1212     Free T4 1.36 ng/dL     Narrative:      Results may be falsely increased if patient taking Biotin.      T3, Free [[id number]]  (Normal) Collected: 11/14/21 0839    Specimen: Blood Updated: 11/14/21 1212     T3, Free 2.43 pg/mL     Narrative:      Results may be falsely increased if patient taking Biotin.      D-dimer, Quantitative [[id number]]  (Abnormal) Collected: 11/14/21 1139    Specimen: Blood Updated: 11/14/21 1210     D-Dimer, Quantitative 1.33 MCGFEU/mL     Narrative:      The [place] D-Dimer test used in conjunction with a clinical pretest probability (PTP) assessment model, has been approved by the FDA to rule out the presence of venous thromboembolism (VTE) in outpatients suspected of deep venous thrombosis (DVT) or pulmonary embolism (PE). The cut-off for negative predictive value is <0.50 MCGFEU/mL.    POC Glucose Once [[id number]]  (Normal) Collected: 11/14/21 1201    Specimen: Blood Updated: 11/14/21 1202     Glucose 93 mg/dL      Comment: Meter: [id number] : [id number] Dorothea CARDENAS              Imaging:  Imaging Results (Last 24 Hours)     Procedure Component Value Units  Date/Time    MRI Pituitary With & Without Contrast [[id number]] Resulted: 11/15/21 1050     Updated: 11/15/21 1125         PPE worn at all times washed before washed afterwards disposed of everything properly was now within 6 feet of them for more than few minutes during my exam no aerosols used at any point    Assessment and Plan:     This patient has newly diagnosed type 1 diabetes.  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  His vision in the left eye appears to be normal and there is no field cut either lets see what the MRI of the pituitary gland shows.  I am not at all convinced that he has a Wernicke encephalopathy but I am going to give him some extra thiamine today and tomorrow 200 mg IV daily for a couple of days.  He definitely has a cystic mass in his pituitary gland that is being worked up appropriately.  Nonetheless note that FSH, LH, TSH and prolactin are all normal his cortisol is also normal and an ACTH level has been drawn for him earlier today  Given the normal left eye vision at this time I certainly do not believe he has an acute optic neuritis.  Note that blurring in the vision of his left eye it would be very typical of someone with diabetic ketoacidosis and that is now improved      Peter Pablo MD  11/15/21  11:58 EST    Electronically signed by Peter Pbalo MD at 11/15/21 1455     Stephan Hernandez MD at 11/14/21 0947            Daily Progress Note.   [address]  11/14/2021    Patient:  Name:  Raquel Sparks  MRN:  [id number]  2000  21 y.o.  male         CC: dka    Interval History/ros:  Pt is awake alert oriented.  +extreme fatigue with minimal exertion  +hypoglycemia this am resolved with po intake   +discoordinated eye movements     Physical Exam:  BP 96/56 (BP Location: Right arm, Patient Position: Lying)   Pulse 62   Temp 97.5 °F (36.4 °C) (Oral)    "Resp 16   Ht 175.3 cm (69.02\")   Wt 69.6 kg (153 lb 7 oz)   SpO2 99%   BMI 22.65 kg/m²   Body mass index is 22.65 kg/m².    Intake/Output Summary (Last 24 hours) at 11/14/2021 0947  Last data filed at 11/14/2021 0317  Gross per 24 hour   Intake 918.75 ml   Output --   Net 918.75 ml     General appearance: NAD, conversant   Eyes: anicteric sclerae, moist conjunctivae; no lid-lag;    HENT: Atraumatic; oropharynx clear with moist mucous membranes and no mucosal ulcerations; normal hard and soft palate  Neck: Trachea midline; FROM,    Lungs: CTA, with normal respiratory effort and no intercostal retractions  CV: RRR, no MRGs   Abdomen: Soft, non-tender  Extremities: No peripheral edema    Skin: wwp no diffuse rash  Psych: Appropriate affect, alert and oriented   Neuro moves all ext, cns 2-12 intact speech intact    Data Review:  Notable Labs:  Results from last 7 days   Lab Units 11/13/21  0610 11/11/21  0640   WBC 10*3/mm3 5.82 4.61   HEMOGLOBIN g/dL 12.9* 16.3   PLATELETS 10*3/mm3 180 230     Results from last 7 days   Lab Units 11/14/21  0839 11/13/21  0610 11/12/21  2305 11/12/21  0454 11/11/21  1613 11/11/21  1416 11/11/21  0826 11/11/21  0640   SODIUM mmol/L  --  143  --  144 142 142  --  139   POTASSIUM mmol/L  --  3.1* 3.3* 3.0* 3.4* 3.5 4.1 3.7   CHLORIDE mmol/L  --  110*  --  113* 115* 115*  --  107   CO2 mmol/L  --  24.9  --  20.1* 18.8* 17.4*  --  12.0*   BUN mg/dL  --  10  --  11 10 11  --  14   CREATININE mg/dL  --  0.91  --  0.91 1.09 1.02  --  1.31*   GLUCOSE mg/dL  --  52*  --  79 192* 208*  --  317*   CALCIUM mg/dL  --  8.4*  --  8.9 8.3* 8.3*  --  9.7   MAGNESIUM mg/dL 1.5*  --   --  1.8 1.9 1.8  --  2.1   PHOSPHORUS mg/dL  --  2.3* 1.5* 1.4* 1.7* 1.6*  --  3.3   Estimated Creatinine Clearance: 124.3 mL/min (by C-G formula based on SCr of 0.91 mg/dL).    Results from last 7 days   Lab Units 11/13/21  0610 11/11/21  0831 11/11/21  0640   AST (SGOT) U/L  --   --  13   ALT (SGPT) U/L  --   --  26 "   LACTATE mmol/L  --  0.8  --    PLATELETS 10*3/mm3 180  --  230             Imaging:  Reviewed chest images personally from past 3 days    Scheduled meds:    enoxaparin, 40 mg, Subcutaneous, Q24H  insulin glargine, 10 Units, Subcutaneous, Nightly  insulin lispro, 0-9 Units, Subcutaneous, TID With Meals  insulin lispro, 3 Units, Subcutaneous, TID With Meals  senna-docusate sodium, 2 tablet, Oral, BID  sodium chloride, 2,000 mL, Intravenous, Once  sodium chloride, 10 mL, Intravenous, Q12H  sodium chloride, 10 mL, Intravenous, Q12H  sodium chloride, 3 mL, Intravenous, Q12H        ASSESSMENT  /  PLAN:  DKA resolved  DM - adult onset type I?  Fatigue  Diplopia   TBI history  Pituitary Abnormality ?adenoma with diplopia and blurried vision new    Neurosurgery and neurology consult for pituitary abn with disconjugate eye movement and blurred vision with new onset dm?pit adenoma as causative agent?  glargine decrease to 5 units at night given am low - mother wishes to try glargine in AM, seems reasonable, will order  Low dose this pm and then see where sugars are in am to dose glargine in am.  Cont meal time insulin with sliding scale as well    Will consult [facility] for DM mgmt now that patient out of icu and will need ongoing fine tuning of his DM mgmt as well as pituitary work up and plan prior to dc, has proven challenging with high daytime readings but low am readings.    IV fluids to stop tonight    Dm educators     Still significantly weakened I will go ahead and order physical therapy for evaluation.  Hemoglobin A1c was 13.8 on arrival wonder if his normoglycemia makes him feel fatigued compared to his hyperglycemia that he is probably had for a while.---->PT consult placed d dimer sent will send free t3 t4       D/w pt and his mother.        Stephan Hernandez MD  [facility]  11/14/21  949am      Electronically signed by Stephan Hernandez MD at 11/14/21 1041     Stephan Hernandez MD at 11/13/21  "1618            Daily Progress Note.   [facility] [address]  11/13/2021    Patient:  Name:  Raquel Sparks  MRN:  [id number]  2000  21 y.o.  male         CC: dka    Interval History/ros:  Pt is awake alert oriented.  Still feels very fatigued overall this morning had sugars a little bit hypoglycemic recovered well.  He is tolerating a diet still decreased exercise tolerance significantly less than his baseline.  No pleuritic chest pain.  Also still reports discoordinated eye movements     Physical Exam:  /63 (BP Location: Right arm, Patient Position: Sitting)   Pulse 77   Temp 97.5 °F (36.4 °C) (Oral)   Resp 18   Ht 175.3 cm (69.02\")   Wt 61.3 kg (135 lb 2.3 oz) Comment: not weighed by RD  SpO2 98%   BMI 19.95 kg/m²   Body mass index is 19.95 kg/m².    Intake/Output Summary (Last 24 hours) at 11/13/2021 1618  Last data filed at 11/13/2021 0855  Gross per 24 hour   Intake 250 ml   Output --   Net 250 ml     General appearance: NAD, conversant   Eyes: anicteric sclerae, moist conjunctivae; no lid-lag;    HENT: Atraumatic; oropharynx clear with moist mucous membranes and no mucosal ulcerations; normal hard and soft palate  Neck: Trachea midline; FROM,    Lungs: CTA, with normal respiratory effort and no intercostal retractions  CV: RRR, no MRGs   Abdomen: Soft, non-tender  Extremities: No peripheral edema    Skin: wwp no diffuse rash  Psych: Appropriate affect, alert and oriented   Neuro moves all ext, cns 2-12 intact speech intact    Data Review:  Notable Labs:  Results from last 7 days   Lab Units 11/13/21  0610 11/11/21  0640   WBC 10*3/mm3 5.82 4.61   HEMOGLOBIN g/dL 12.9* 16.3   PLATELETS 10*3/mm3 180 230     Results from last 7 days   Lab Units 11/13/21  0610 11/12/21  2305 11/12/21  0454 11/11/21  1613 11/11/21  1416 11/11/21  0826 11/11/21  0640   SODIUM mmol/L 143  --  144 142 142  --  139   POTASSIUM mmol/L 3.1* 3.3* 3.0* 3.4* 3.5 4.1 3.7   CHLORIDE mmol/L 110*  --  113* " 115* 115*  --  107   CO2 mmol/L 24.9  --  20.1* 18.8* 17.4*  --  12.0*   BUN mg/dL 10  --  11 10 11  --  14   CREATININE mg/dL 0.91  --  0.91 1.09 1.02  --  1.31*   GLUCOSE mg/dL 52*  --  79 192* 208*  --  317*   CALCIUM mg/dL 8.4*  --  8.9 8.3* 8.3*  --  9.7   MAGNESIUM mg/dL  --   --  1.8 1.9 1.8  --  2.1   PHOSPHORUS mg/dL 2.3* 1.5* 1.4* 1.7* 1.6*  --  3.3   Estimated Creatinine Clearance: 109.5 mL/min (by C-G formula based on SCr of 0.91 mg/dL).    Results from last 7 days   Lab Units 11/13/21  0610 11/11/21  0831 11/11/21  0640   AST (SGOT) U/L  --   --  13   ALT (SGPT) U/L  --   --  26   LACTATE mmol/L  --  0.8  --    PLATELETS 10*3/mm3 180  --  230             Imaging:  Reviewed chest images personally from past 3 days    Scheduled meds:    enoxaparin, 40 mg, Subcutaneous, Q24H  insulin glargine, 30 Units, Subcutaneous, Nightly  insulin lispro, 0-9 Units, Subcutaneous, TID With Meals  senna-docusate sodium, 2 tablet, Oral, BID  sodium chloride, 2,000 mL, Intravenous, Once  sodium chloride, 10 mL, Intravenous, Q12H  sodium chloride, 10 mL, Intravenous, Q12H  sodium chloride, 3 mL, Intravenous, Q12H        ASSESSMENT  /  PLAN:  DKA  DM - adult onset type I?  Fatigue  Diplopia - mri pituitary?  TBI history    MRI still pending discoordinated eye movements-we will consult neurology for any evaluation of this  glargine decrease to 20 units given am low  Start meal time insulin with sliding scale as well  IV fluids    Dm educators  Wait mri    Still significantly weakened I will go ahead and order physical therapy for evaluation.  Hemoglobin A1c was 13.8 on arrival wonder if his normoglycemia makes him feel fatigued compared to his hyperglycemia that he is probably had for a while.       D/w pt and his mother.        Stephan Hernandez MD  [facility]  11/13/21  15:34 EST      Mri reports back  Neurosurgery consult - discoordinated eye movements, diplopia worsening with pituitary  abnormality      Stephan Hernandez MD  [facility]  21  18:42 EST        Electronically signed by Stephan Hernandez MD at 21 1842          Consult Notes       Won Ford MD at 21 1727          [facility]   Consult Note    Patient Name: Raquel Sparks  : 2000  MRN: [id number]  Primary Care Physician:  Ralf Albrecht MD  Referring Physician: Jesús Brasher MD  Date of admission: 2021    Consults  Subjective   Subjective     Reason for Consult/ Chief Complaint: Blurring of the vision of the left eye with an abnormal MRI finding    We were asked to see this patient because of an abnormal brain MRI finding.  He was admitted for treatment of DKA.  He has been blind in the right eye since about age 1.  He noticed recently there was some blurring in his left eye that he was concerned about that and he mentioned it.  Apparently he had an MRI done earlier this year ordered by Dr. Subramanian his primary care physician.  There was some T2 flair abnormalities in the region of the periaqueductal gray.  The radiologist brought up the possibility of neuromyelitis optica.  In any event another MRI was done just upon admission that described this structures being cystic and seemingly arising out of the left side of the pituitary region.  This was interpreted by another radiologist.  He has good extraocular movements and I cannot detect any visual loss in his left eye.  A MRI of the pituitary gland with and without contrast has been ordered.  He was with his mother and I told him we would touch base with him after that is done.  I am doubtful that this is necessarily related to his symptoms which are probably explained by his DKA.    Raquel Sparks is a 21 y.o. male see above    Review of Systems   Constitutional: Negative.    HENT: Negative.    Eyes:        Blurring of vision in left eye   Respiratory: Negative.    Cardiovascular: Negative.    Gastrointestinal:  Negative.    Endocrine: Negative.    Genitourinary: Negative.    Musculoskeletal: Negative.    Skin: Negative.    Allergic/Immunologic: Negative.    Neurological: Negative.    Hematological: Negative.    Psychiatric/Behavioral: Negative.    All other systems reviewed and are negative.     Allergies:  Allergies   Allergen Reactions   • Strawberry Angioedema   • Strawberry Extract Hives       Objective    Objective     Vitals:  Temp:  [97.1 °F (36.2 °C)-98.5 °F (36.9 °C)] 98.5 °F (36.9 °C)  Heart Rate:  [60-80] 80  Resp:  [16-18] 16  BP: ()/(56-76) 127/76    Physical Exam  Constitutional:       Appearance: He is normal weight.   HENT:      Head: Normocephalic.      Right Ear: External ear normal.      Left Ear: External ear normal.      Nose: Nose normal.      Mouth/Throat:      Mouth: Mucous membranes are moist.   Eyes:      Extraocular Movements: Extraocular movements intact.      Comments: Blind in right eye   Cardiovascular:      Rate and Rhythm: Normal rate.      Pulses: Normal pulses.   Pulmonary:      Effort: Pulmonary effort is normal.   Abdominal:      General: Abdomen is flat.   Musculoskeletal:         General: Normal range of motion.      Cervical back: Normal range of motion.   Skin:     General: Skin is warm.      Capillary Refill: Capillary refill takes less than 2 seconds.   Neurological:      General: No focal deficit present.      Mental Status: He is alert.   Psychiatric:         Mood and Affect: Mood normal.         Result Review    Result Review:  I have personally reviewed the results from the time of this admission to 11/14/2021 17:27 EST and agree with these findings:  []  Laboratory  []  Microbiology  [x]  Radiology  []  EKG/Telemetry   []  Cardiology/Vascular   []  Pathology  []  Old records  []  Other:MR SCAN OF THE BRAIN WITHOUT CONTRAST     HISTORY: Hyperglycemia. Blurred vision.     The MR scan was performed with sagittal and axial images without  contrast and compared to previous  MRI scan dated 01/06/2021. The  ventricles are normal in size and midline. Some previous T2 FLAIR  hyperintensity in the periaqueductal gray matter noted on 01/06/2021 is  less apparent on today's exam and remains nonspecific. There is no  evidence of intracranial hemorrhage or mass effect and the diffusion  sequence appears normal.     There are normal flow voids in the major vessels. There is a 7 x 10 mm  cystic structure abutting the left margin of the pituitary gland and the  medial margin of the cavernous carotid artery. It appears to be  displacing the cavernous carotid artery laterally. It does not abut the  optic chiasm but could potentially relate to the patient's symptoms of  visual changes.     CONCLUSION: Cystic structure abutting the left margin of the pituitary  gland and causing slight displacement of the cavernous carotid artery  laterally and measuring 7 x 10 mm. It shows no change in size from the  previous MRI scan of 01/06/2021. The previous scan was performed with  intravenous contrast and shows no abnormal enhancement. The findings  suggest a cystic lesion that may relate to the pituitary gland. The  pituitary gland is otherwise normal in appearance. Further evaluation  with pituitary protocol MRI may be helpful.     This report was finalized on 11/13/2021 4:53 PM by Dr. Pito Perez M.D.       Assessment/Plan   Assessment / Plan     Brief Patient Summary:  Raquel Sparks is a 21 y.o. male who [facility] with a hospital with DKA but had some complaints of left eye blurring.  A MRI showed a cystic lesion arising on the left side the pituitary.  I am somewhat doubtful that this is related to his symptoms which probably are related to his DKA but we will review the MRI of the pituitary with and without contrast once it is done.    Active Hospital Problems:  Active Hospital Problems    Diagnosis    • **Diabetic ketoacidosis without coma associated with type 1 diabetes mellitus (HCC)    •  Hypomagnesemia    • Anemia    • Abnormal MRI      Plan: See above      Electronically signed by Won Ford MD, 11/14/21, 5:27 PM EST.    Electronically signed by Won Ford MD at 11/14/21 6803     Dontrell Soriano APRN at 11/14/21 2203      Consult Orders    1. Inpatient Internal Medicine Consult [[id number]] ordered by Stephan Hernandez MD          Attestation signed by Crispin Gutierres MD at 11/14/21 3325    I have reviewed this documentation and agree.    21-year-old gentleman who had Covid the end of September and then 2 weeks later developed spiking blood sugars.  Patient likely is new onset type I diabetic.  Patient was started on Metformin outpatient clinic.  Patient is now mid to the hospital with nausea vomiting.  Patient is found to be in DKA    General awake alert oriented x3  Heart is regular rhythm no murmurs rubs gallops  Lungs are clear to auscultation bilaterally no wheezes rhonchi  Abdomen is soft nontender nondistended  Extremities no cyanosis clubbing or edema    Patient was beta hydroxybutyrate positive in the ER.  Anion gap was 20 but bicarbonate was 18.8    Patient likely type I diabetic but has limited pancreatic function.  Would anticipate eventually patient will be fully insulin-dependent.    Patient needs follow-up with endocrinology outpatient    MARLENI antibody and islet cell antibodies complete    Pituitary cyst we will get FSH LH ACTH levels  Neurosurgery seen    Discussed with neurology                          Patient Name:  Raquel Sparks  YOB: 2000  Patient Care Team:  Ralf Albrecht MD as PCP - General (Internal Medicine)    Date of Admission:  11/11/2021  Date of Consult:  11/14/2021    Inpatient Internal Medicine Consult  Consult performed by: Dontrell Soriano APRN  Consult ordered by: Stephan Hernandez MD  Reason for consult: Evaluate status and make recommendations regarding treatment for type I diabetes  mellitus.        Subjective   History of Present Illness  Mr. Sparks is a 21 y.o. male that has been admitted to [facility] due to DKA.  He has been admitted by Stephan Hernandez MD and and we were asked to see and assist with his medical problems, specifically relating to his type 1 diabetes mellitus management and anemia.    Patient recently started on Metformin prior to hospital admission; however, patient's mother noted blood sugars uncontrolled and increased fatigued; therefore, patient admitted through [facility] ER for DKA with glucose 317 and started on insulin drip.  Pulmonologist requests transfer of care to [facility] for remainder of hospitalization for management of type I diabetes mellitus.    Recommendations pending hospital course.  See additional details on assessment.        Allergies:  Strawberry and Strawberry extract    Review of Systems   Constitutional: Negative for chills and fever.   HENT: Negative for congestion and rhinorrhea.    Eyes: Positive for visual disturbance.   Respiratory: Negative for cough and shortness of breath.    Cardiovascular: Negative for chest pain and leg swelling.   Gastrointestinal: Negative for abdominal pain, constipation, diarrhea and vomiting.   Endocrine: Negative for polydipsia, polyphagia and polyuria.   Genitourinary: Negative for difficulty urinating and dysuria.   Musculoskeletal: Negative for back pain and myalgias.   Skin: Negative for rash and wound.   Neurological: Positive for weakness (generalized). Negative for dizziness.   Psychiatric/Behavioral: Negative for confusion and hallucinations.       Objective      Vital Signs  Temp:  [97.1 °F (36.2 °C)-98.5 °F (36.9 °C)] 98.5 °F (36.9 °C)  Heart Rate:  [60-80] 80  Resp:  [16-18] 16  BP: ()/(56-76) 127/76  Body mass index is 22.65 kg/m².    Physical Exam  Constitutional:       General: He is not in acute distress.     Appearance: He is not toxic-appearing.      Comments: Generally weak    HENT:      Head: Normocephalic and atraumatic.   Eyes:      Extraocular Movements: Extraocular movements intact.      Conjunctiva/sclera: Conjunctivae normal.   Cardiovascular:      Rate and Rhythm: Normal rate.      Heart sounds: Normal heart sounds.   Pulmonary:      Effort: Pulmonary effort is normal.      Breath sounds: Normal breath sounds.   Abdominal:      General: Bowel sounds are normal.      Palpations: Abdomen is soft.   Musculoskeletal:         General: No tenderness.      Cervical back: Normal range of motion and neck supple.      Right lower leg: No edema.      Left lower leg: No edema.   Skin:     General: Skin is warm and dry.   Neurological:      Mental Status: He is alert and oriented to person, place, and time.      Cranial Nerves: No cranial nerve deficit.   Psychiatric:         Behavior: Behavior normal.         Thought Content: Thought content normal.         Results Review:   I reviewed the patient's new clinical results.  I reviewed the patient's new imaging results and agree with the interpretation.  I reviewed the patient's other test results and agree with the interpretation  I personally viewed and interpreted the patient's EKG/Telemetry data        Active Hospital Problems    Diagnosis  POA   • **Diabetic ketoacidosis without coma associated with type 1 diabetes mellitus (HCC) [E10.10]  Yes   • Hypomagnesemia [E83.42]  Clinically Undetermined   • Anemia [D64.9]  Yes   • Abnormal MRI [R93.89]  Yes         Diabetic ketoacidosis without coma associated with type 1 diabetes mellitus (HCC)  A1c 13.8 (11/11) increased from 5.7 (10/2020)  Glucose rather labile yet noted Lantus recently decreased from 20 units nightly to 10 units nightly  Plan to continue long-acting & schedule lispro 3 units with meals & decrease moderate dose sliding scale to low dose TID with meals  Tolerating P.O.     Abnormal MRI  Neurology following MRI brain / pituitary protocol & request [facility] to advise further       Hypomagnesemia  Replete per hospital protocol  Labs in a.m.      Anemia  Serum Hgb trending down without overt signs of bleeding  Suspect hemodilution given continuous IVFS  Ordering iron profile, ferritin, vitamin B12, folate in a.m.    Thank you very much for asking [facility] to be involved in this patient's care. We will follow along with you.      MONE Herron  [facility]  11/14/21  16:41 EST      Electronically signed by Crispin Gutierres MD at 11/14/21 4577     Pablo Javed MD at 11/14/21 0917      Consult Orders    1. Inpatient Neurology Consult General [[id number]] ordered by Stephan Hernandez MD at 11/13/21 1840               Neurology Consult Note    Consult Date: 11/14/2021    Referring MD: Jesús Brasher MD    Reason for Consult I have been asked to see the patient in neurological consultation to render advice and opinion regarding disconjugate gaze    Raquel Sparks is a 21 y.o.  male with past medical history of pituitary cystic adenoma, recently diagnosed diabetes mellitus on October 2021, ADHD, asthma who presented to the hospital for generalized weakness and elevated blood sugar.  Patient had a recent Covid infection in September/2021.  On October he felt generalized weakness, increased thirst and urination then presented to outside hospital and his blood sugar was found to be high and started on Metformin since that time he was not feeling well complaining of generalized weakness, blurry vision.  On this admission he presented with blood sugar of 317 with anion gap of 20, venous blood gas 7.14 and elevated ketone bodies and started on DKA protocol.  Neurology consulted for the disconjugate gaze.      ROS:  Positive for fatigue and generalized weakness  Positive for polyuria, polydipsia  Positive for weight gain, no sweating  Positive for nausea and vomiting improved, no diarrhea  No fevers, chills  No weakness, numbness  No chest pain or  "palpitation    All other systems reviewed and they are negative    Exam  BP 96/56 (BP Location: Right arm, Patient Position: Lying)   Pulse 62   Temp 97.5 °F (36.4 °C) (Oral)   Resp 16   Ht 175.3 cm (69.02\")   Wt 69.6 kg (153 lb 7 oz)   SpO2 99%   BMI 22.65 kg/m²   Gen: NAD, vitals reviewed  MS: oriented x3, recent/remote memory intact, normal attention/concentration, language intact, no neglect.  CN: visual acuity grossly normal, no visual field cut, pupils 3 mm on the right, 2 mm on the left both reactive to the light, disconjugate gaze with right esotropia patient states he does have decreased vision on the right eye at baseline, no facial droop, no dysarthria  Motor: 5/5 throughout upper and lower extremities, normal tone  Sensory exam: Normal to light touch throughout  Abnormal movements: Not seen  Reflexes: 2+ throughout with downgoing plantars bilaterally  Coordination: Normal finger-nose-finger and heel-to-shin bilaterally  Gait: Not assessed    DATA:    Lab Results   Component Value Date    GLUCOSE 52 (L) 11/13/2021    CALCIUM 8.4 (L) 11/13/2021     11/13/2021    K 3.1 (L) 11/13/2021    CO2 24.9 11/13/2021     (H) 11/13/2021    BUN 10 11/13/2021    CREATININE 0.91 11/13/2021    EGFRIFAFRI 125 11/13/2021    EGFRIFNONA 76 09/17/2020    BCR 11.0 11/13/2021    ANIONGAP 8.1 11/13/2021     Lab Results   Component Value Date    WBC 5.82 11/13/2021    HGB 12.9 (L) 11/13/2021    HCT 38.9 11/13/2021    MCV 88.8 11/13/2021     11/13/2021       Lab review: Glucose 327 on presentation currently 52, sodium 143, potassium 3.1, BUN 10, creatinine 0.91, WBC 5.8    Imaging review: Personally reviewed his MRI brain without contrast which showed a cystic mass at the pituitary gland measuring 10 mm in diameter. It shows no change in size from the  previous MRI scan of 01/06/2021.    Diagnoses:  Pituitary cystic mass likely cystic adenoma  Disconjugate gaze  New onset type 1 diabetes mellitus  Diabetic " "ketoacidosis improved  Rule out hypercortisolism as the reason for his diabetes        PLAN:   1.  Recommend endocrine consult work-up for the adenoma  2.  Recommend neurosurgery consult and follow-up on the pituitary adenoma  3.  We will order MRI brain with and without contrast pituitary protocol and follow on the results.  4.  Will defer hypercortisolism work-up to endocrine if they feel necessary.      We will continue to follow and advise.      Plan discussed with the patient,mother nursing staff.    \"Dictated utilizing Dragon dictation\".        Electronically signed by Pablo Javed MD at 21 [id number]          Discharge Summary      Crispin Gutierres MD at 11/15/21 4731                       [facility]  [phone]    DISCHARGE SUMMARY  [facility]    Patient Identification:  Name: Raquel Sparks  Age: 21 y.o.  Sex: male  :  2000  MRN: [id number]  Primary Care Physician: Ralf Albrecht MD    Admit date: 2021  Discharge date and time:      Discharge Diagnoses:  Diabetic ketoacidosis without coma associated with type 1 diabetes mellitus (HCC)    Hypomagnesemia    Anemia    Abnormal MRI       History of present illness from H&P:    Mr. García is a 20yo male with recently diagnosed DM, he was placed on metformin but mother reports his blood sugars have not been controlld on this and he has been very fatigued as well.  He has had some nausea and vomiting  And visual changes. He has had polydipsia and polyuria.  Symptoms have been pretty consistent, worsening, become severe today. No alleviating or exacerbating factors.  He was found to be in DKA and was started on inuslin gtt. He is hungry now and gap is closed.  Mother reports constipation as well.    Hospital Course:     Patient was admitted to the hospital and started on insulin drip for DKA.  Patient is now tolerating oral intake feeling much better and anxious for " discharge.    Patient did have significant low blood sugars each morning over the last few mornings.  Likely patient still has some pancreatic function so only requires approximately 12 units of Lantus at night along with mealtime insulin during the day.  Patient will need to follow-up with endocrinology outpatient and the patient has been educated on a sliding scale insulin as well.    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  Patient was patient was seen by neurology and MRI showed cyst near the pituitary gland therefore underwent an MRI of the pituitary and was seen by neurosurgery.  Neurosurgery would like to see the patient back in 8 weeks.  And the patient needs to follow-up with ophthalmology per neurosurgery's instructions, nurse will give them information for [facility] on [facility].    Basic labs for pituitary were ordered including FSH LH, cortisol level, prolactin which were unremarkable.  ACTH level is currently pending and sheila as well as islet cell antibodies currently pending.      The patient was seen and examined on the day of discharge.    Consults:   Consults     Date and Time Order Name Status Description    11/15/2021  2:36 PM Inpatient Endocrinology Consult      11/14/2021  9:47 AM Inpatient Internal Medicine Consult Completed     11/14/2021 12:33 AM Inpatient Neurology Consult General Completed     11/13/2021  6:41 PM Inpatient Neurosurgery Consult      11/11/2021  8:04 AM Pulmonology (on-call MD unless specified)            Results from last 7 days   Lab Units 11/15/21  0721   WBC 10*3/mm3 4.07   HEMOGLOBIN g/dL 12.3*   HEMATOCRIT % 36.4*   PLATELETS 10*3/mm3 159       Results from last 7 days   Lab Units 11/15/21  0721   SODIUM mmol/L 139   POTASSIUM mmol/L 3.7   CHLORIDE mmol/L 105   CO2 mmol/L 26.7   BUN mg/dL 13   CREATININE mg/dL 1.02   GLUCOSE mg/dL 280*   CALCIUM  mg/dL 7.9*       Significant Diagnostic Studies:   WBC   Date Value Ref Range Status   11/15/2021 4.07 3.40 - 10.80 10*3/mm3 Final     Hemoglobin   Date Value Ref Range Status   11/15/2021 12.3 (L) 13.0 - 17.7 g/dL Final     Hematocrit   Date Value Ref Range Status   11/15/2021 36.4 (L) 37.5 - 51.0 % Final     Platelets   Date Value Ref Range Status   11/15/2021 159 140 - 450 10*3/mm3 Final     Sodium   Date Value Ref Range Status   11/15/2021 139 136 - 145 mmol/L Final     Potassium   Date Value Ref Range Status   11/15/2021 3.7 3.5 - 5.2 mmol/L Final     Chloride   Date Value Ref Range Status   11/15/2021 105 98 - 107 mmol/L Final     CO2   Date Value Ref Range Status   11/15/2021 26.7 22.0 - 29.0 mmol/L Final     BUN   Date Value Ref Range Status   11/15/2021 13 6 - 20 mg/dL Final     Creatinine   Date Value Ref Range Status   11/15/2021 1.02 0.76 - 1.27 mg/dL Final     Glucose   Date Value Ref Range Status   11/15/2021 280 (H) 65 - 99 mg/dL Final     Calcium   Date Value Ref Range Status   11/15/2021 7.9 (L) 8.6 - 10.5 mg/dL Final     Magnesium   Date Value Ref Range Status   11/15/2021 1.8 1.6 - 2.6 mg/dL Final     Phosphorus   Date Value Ref Range Status   11/15/2021 3.9 2.5 - 4.5 mg/dL Final     No results found for: AST, ALT, ALKPHOS  No results found for: APTT, INR  No results found for: COLORU, CLARITYU, SPECGRAV, PHUR, PROTEINUR, GLUCOSEU, KETONESU, BLOODU, NITRITE, LEUKOCYTESUR, BILIRUBINUR, UROBILINOGEN, RBCUA, WBCUA, BACTERIA, UACOMMENT  No results found for: TROPONINT, TROPONINI, BNP  No components found for: HGBA1C;2  No components found for: TSH;2    Imaging Results (All)     Procedure Component Value Units Date/Time    MRI Pituitary With & Without Contrast [[id number]] Collected: 11/15/21 1357     Updated: 11/15/21 1422    Narrative:      MRI PITUITARY W WO CONTRAST-     CLINICAL HISTORY: Follow-up possible cystic lesion adjacent to the  pituitary gland seen on previous imaging. Follow up  minimal  periaqueductal T2 hyperintensity.     TECHNIQUE: Multiple axial T1, T2 and diffusion images were acquired.  Thin section coronal and sagittal T1 and T2-weighted images were  obtained through the sella turcica including T1-weighted images acquired  after intravenous injection of MultiHance contrast.      COMPARISON: MRIs of the brain dated 11/13/2021 and 1/6/2021.     FINDINGS: The brain and ventricular system appear structurally normal.  There is no evidence of recent or old infarct or hemorrhage. No foci of  restricted diffusion are identified in the brain or brainstem. The MRI  on 01/06/2021 showed minimal T2 high signal in the periaqueductal white  matter. This is less prominent on the current study, and was most likely  due to artifact. The possible small cystic lesion located along the left  side of the pituitary gland shown on the previous MRI study is better  delineated on the current MRI due to the thin section T2-weighted images  and postcontrast T1-weighted images through the sella turcica. This  appears to represent CSF extending into the left side of the sella  turcica producing mild localized bone remodeling and widening of the  sella turcica on the left and perhaps minimal flattening of the left  side of the pituitary gland. In addition, the pituitary stalk is  deviated slightly toward the right. The findings are consistent with a  partially empty sella, or possibly a small arachnoid cyst. Regardless,  this is unlikely to be of any clinical significance. The pituitary gland  shows normal homogeneous enhancement. No abnormal enhancement is  identified within the brain parenchyma. There are no masses. The  paranasal sinuses are all well aerated. Normal flow voids are observed  in the major vascular structures.       Impression:      Localized widening of the left side of the sella turcica  containing CSF consistent with a small arachnoid cyst or simply a  partially empty sella. Otherwise  unremarkable MRI of the brain with/to  the pituitary gland. Minimal T2 hyperintensity in the periaqueductal  white matter shown on previous imaging is less prominent, and was likely  artifactual     This report was finalized on 11/15/2021 2:19 PM by Dr. Roger Raya M.D.       MRI Brain Without Contrast [[id number]] Collected: 11/13/21 1625     Updated: 11/13/21 1656    Narrative:      MR SCAN OF THE BRAIN WITHOUT CONTRAST     HISTORY: Hyperglycemia. Blurred vision.     The MR scan was performed with sagittal and axial images without  contrast and compared to previous MRI scan dated 01/06/2021. The  ventricles are normal in size and midline. Some previous T2 FLAIR  hyperintensity in the periaqueductal gray matter noted on 01/06/2021 is  less apparent on today's exam and remains nonspecific. There is no  evidence of intracranial hemorrhage or mass effect and the diffusion  sequence appears normal.     There are normal flow voids in the major vessels. There is a 7 x 10 mm  cystic structure abutting the left margin of the pituitary gland and the  medial margin of the cavernous carotid artery. It appears to be  displacing the cavernous carotid artery laterally. It does not abut the  optic chiasm but could potentially relate to the patient's symptoms of  visual changes.     CONCLUSION: Cystic structure abutting the left margin of the pituitary  gland and causing slight displacement of the cavernous carotid artery  laterally and measuring 7 x 10 mm. It shows no change in size from the  previous MRI scan of 01/06/2021. The previous scan was performed with  intravenous contrast and shows no abnormal enhancement. The findings  suggest a cystic lesion that may relate to the pituitary gland. The  pituitary gland is otherwise normal in appearance. Further evaluation  with pituitary protocol MRI may be helpful.     This report was finalized on 11/13/2021 4:53 PM by Dr. Pito Perez M.D.       XR Chest 1 View [[id number]]  Collected: 11/11/21 0639     Updated: 11/11/21 0643    Narrative:      XR CHEST 1 VW-     Clinical: Hyperglycemia, generalized weakness     FINDINGS: Normal heart size. Mediastinum and froilan have a satisfactory  appearance. No effusion, edema or acute airspace disease. Lungs clear.     CONCLUSION: Normal chest     This report was finalized on 11/11/2021 6:40 AM by Dr. Kong Sandy M.D.         No results found for: SITE, ALLENTEST, PHART, HRI6XOA, PO2ART, VJC0TIE, BASEEXCESS, T0UBFNYX, HGBBG, HCTABG, OXYHEMOGLOBI, METHHGBN, CARBOXYHGB, CO2CT, BAROMETRIC, MODALITY, FIO2       Discharge Medications      New Medications      Instructions Start Date   b complex vitamins capsule   1 capsule, Oral, Daily      glucose blood test strip   test blood sugar 4 times a day      Glucose Management tablet   Use as needed for emergently low blood glucose levels. Formulary Compliance Approval      Gvoke HypoPen 1-Pack 1 MG/0.2ML solution auto-injector  Generic drug: Glucagon   Use as directed for emergently low blood glucose level.      HumaLOG KwikPen 100 UNIT/ML solution pen-injector  Generic drug: Insulin Lispro (1 Unit Dial)   3 Units, Subcutaneous, 3 Times Daily With Meals      insulin lispro 100 UNIT/ML injection  Commonly known as: ADMELOG   0-7 Units, Subcutaneous, 3 Times Daily Before Meals      insulin detemir 100 UNIT/ML injection  Commonly known as: LEVEMIR   12 Units, Subcutaneous, Nightly, Formulary Compliance Approval      Lantus SoloStar 100 UNIT/ML injection pen  Generic drug: Insulin Glargine   12 Units, Subcutaneous, Nightly      Microlet Lancets misc   Ok to substitute for the patients needs      NovoFine Plus Pen Needle 32G X 4 MM misc  Generic drug: Insulin Pen Needle   1 each, Subcutaneous, 4 Times Daily PRN, Formulary Compliance Approval      NovoLOG FlexPen 100 UNIT/ML solution pen-injector sc pen  Generic drug: insulin aspart   5 Units, Subcutaneous, 3 Times Daily With Meals, Formulary Compliance  Approval         Stop These Medications    acetaminophen 500 MG tablet  Commonly known as: TYLENOL     ibuprofen 400 MG tablet  Commonly known as: ADVIL,MOTRIN     metFORMIN 500 MG tablet  Commonly known as: GLUCOPHAGE              Patient Instructions:       Future Appointments   Date Time Provider Department Center   11/19/2021  3:30 PM Ralf Albrecht MD [facility]         Follow-up Information     Ralf Albrecht MD Follow up in 1 week(s).    Specialty: Internal Medicine  Contact information:  [address] [facility] [id number] [phone]             Jd Pike MD Follow up in 2 week(s).    Specialty: Endocrinology  [facility] information:  [address] [facility] [address] [id number] [phone]                         Discharge Order (From admission, onward)     Start     Ordered    11/15/21 0923  Discharge patient  Once        Expected Discharge Date: 11/15/21    Discharge Disposition: Home or Self Care    Physician of Record for Attribution - Please select from Treatment Team: CRISPIN GUTIERRES [[id number]]    Review needed by CMO to determine Physician of Record: No       Question Answer Comment   Physician of Record for Attribution - Please select from Treatment Team CRISPIN GUTIERRES    Review needed by CMO to determine Physician of Record No        11/15/21 0927                Diet Order   Procedures   • Diet Regular; Consistent Carbohydrate, Lactose Restricted       TEST  RESULTS PENDING AT DISCHARGE  Pending Labs     Order Current Status    ACTH In process    Anti-islet Cell Antibody In process    Glutamic Acid Decarboxylase In process            Discharge instructions:  Follow up with your primary care provider in 1-2 weeks with a cbc and cmp         Total time spent discharging patient including evaluation, post hospitalization follow up,  medication and post hospitalization instructions and education, total time exceeds 30 minutes.    Signed:  Crispin Gutierres  MD  11/15/2021  17:26 EST    Electronically signed by Crispin Gutierres MD at 11/15/21 1737       Discharge Order (From admission, onward)     Start     Ordered    11/15/21 0923  Discharge patient  Once        Expected Discharge Date: 11/15/21    Discharge Disposition: Home or Self Care    Physician of Record for Attribution - Please select from Treatment Team: CRISPIN GUTIERRES [[id number]]    Review needed by CMO to determine Physician of Record: No       Question Answer Comment   Physician of Record for Attribution - Please select from Treatment Team CRISPIN GUTIERRES    Review needed by CMO to determine Physician of Record No        11/15/21 0994

## 2021-11-17 ENCOUNTER — TELEPHONE (OUTPATIENT)
Dept: FAMILY MEDICINE CLINIC | Facility: CLINIC | Age: 21
End: 2021-11-17

## 2021-11-17 DIAGNOSIS — E10.10 DIABETIC KETOACIDOSIS WITHOUT COMA ASSOCIATED WITH TYPE 1 DIABETES MELLITUS (HCC): Primary | ICD-10-CM

## 2021-11-17 LAB
GAD65 AB SER IA-ACNC: 323.9 U/ML (ref 0–5)
PANC ISLET CELL AB TITR SER: NEGATIVE {TITER}

## 2021-11-17 NOTE — TELEPHONE ENCOUNTER
Caller: Raquel Sparks    Relationship: Self    Best call back number: [phone]    What is the medical concern/diagnosis:     What specialty or service is being requested: ENDOCRINOLOGY    What is the provider, practice or medical service name: [facility]      Any additional details: PATIENTS MOM STATES THAT SHE WOULD LIKE TO GET THIS IN ASAP SO THAT HE CAN GET INTO SEE THEM ASAP

## 2021-11-18 NOTE — PAYOR COMM NOTE
"DISCHARGED  REF #[id number]      Raquel Roberts (21 y.o. Male)             Date of Birth Social Security Number Address Home Phone MRN    2000  [id number]  [address]  [facility] [address] [phone] [id number]    [employer] Mar[employer] Status             Mosque Single       Admission Date Admission Type Admitting Provider Attending Provider Department, Room/Bed    11/11/21 Emergency Jesús Brasher MD  [address], P384/1    Discharge Date Discharge Disposition Discharge Destination          11/15/2021 Home or Self Care              Attending Provider: (none)   Allergies: Strawberry, Strawberry (Diagnostic), Strawberry Extract    Isolation: None   Infection: COVID (History) (11/12/21)   Code Status: Prior   Advance Care Planning Activity    Ht: 175.3 cm (69.02\")   Wt: 72.8 kg (160 lb 7.9 oz)    Admission Cmt: None   Principal Problem: Diabetic ketoacidosis without coma associated with type 1 diabetes mellitus (HCC) [E10.10]                 Active Insurance as of 11/11/2021     Primary Coverage     Payor Plan Insurance Group Employer/Plan Group    HUMANA HUMANA [id number]     Payor Plan Address Payor Plan Phone Number Payor Plan Fax Number Effective Dates    [address] [phone]  1/1/2020 - None Entered    [facility] [id number]       Subscriber Name Subscriber Birth Date Member ID       RAQUEL ROBERTS 12/5/1972 [id number]           Secondary Coverage     Payor Plan Insurance Group Employer/Plan Group    [facility] [id number]     Payor Plan Address Payor Plan Phone Number Payor Plan Fax Number Effective Dates    [address]   9/20/2021 - None Entered    [facility] [id number]       Subscriber Name Subscriber Birth Date Member ID       KAYLYNN ROBERTS 5/14/1974 [id number]                 Emergency Contacts      (Rel.) Home Phone Work Phone Mobile Phone    Kaylynn Roberts (Mother) [phone] -- --             "   Discharge Summary      Crispin Gutierres MD at 11/15/21 1726                       [facility]  [phone]    DISCHARGE SUMMARY  [facility]    Patient Identification:  Name: Raquel Sparks  Age: 21 y.o.  Sex: male  :  2000  MRN: [id number]  Primary Care Physician: Ralf Albrecht MD    Admit date: 2021  Discharge date and time:      Discharge Diagnoses:  Diabetic ketoacidosis without coma associated with type 1 diabetes mellitus (HCC)    Hypomagnesemia    Anemia    Abnormal MRI       History of present illness from H&P:    Mr. García is a 22yo male with recently diagnosed DM, he was placed on metformin but mother reports his blood sugars have not been controlld on this and he has been very fatigued as well.  He has had some nausea and vomiting  And visual changes. He has had polydipsia and polyuria.  Symptoms have been pretty consistent, worsening, become severe today. No alleviating or exacerbating factors.  He was found to be in DKA and was started on inuslin gtt. He is hungry now and gap is closed.  Mother reports constipation as well.    Hospital Course:     Patient was admitted to the hospital and started on insulin drip for DKA.  Patient is now tolerating oral intake feeling much better and anxious for discharge.    Patient did have significant low blood sugars each morning over the last few mornings.  Likely patient still has some pancreatic function so only requires approximately 12 units of Lantus at night along with mealtime insulin during the day.  Patient will need to follow-up with endocrinology outpatient and the patient has been educated on a sliding scale insulin as well.    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  Patient was patient was seen by neurology and MRI showed cyst near the pituitary gland therefore underwent an MRI  of the pituitary and was seen by neurosurgery.  Neurosurgery would like to see the patient back in 8 weeks.  And the patient needs to follow-up with ophthalmology per neurosurgery's instructions, nurse will give them information for [facility] on [facility].    Basic labs for pituitary were ordered including FSH LH, cortisol level, prolactin which were unremarkable.  ACTH level is currently pending and sheila as well as islet cell antibodies currently pending.      The patient was seen and examined on the day of discharge.    Consults:   Consults     Date and Time Order Name Status Description    11/15/2021  2:36 PM Inpatient Endocrinology Consult      11/14/2021  9:47 AM Inpatient Internal Medicine Consult Completed     11/14/2021 12:33 AM Inpatient Neurology Consult General Completed     11/13/2021  6:41 PM Inpatient Neurosurgery Consult      11/11/2021  8:04 AM Pulmonology (on-call MD unless specified)            Results from last 7 days   Lab Units 11/15/21  0721   WBC 10*3/mm3 4.07   HEMOGLOBIN g/dL 12.3*   HEMATOCRIT % 36.4*   PLATELETS 10*3/mm3 159       Results from last 7 days   Lab Units 11/15/21  0721   SODIUM mmol/L 139   POTASSIUM mmol/L 3.7   CHLORIDE mmol/L 105   CO2 mmol/L 26.7   BUN mg/dL 13   CREATININE mg/dL 1.02   GLUCOSE mg/dL 280*   CALCIUM mg/dL 7.9*       Significant Diagnostic Studies:   WBC   Date Value Ref Range Status   11/15/2021 4.07 3.40 - 10.80 10*3/mm3 Final     Hemoglobin   Date Value Ref Range Status   11/15/2021 12.3 (L) 13.0 - 17.7 g/dL Final     Hematocrit   Date Value Ref Range Status   11/15/2021 36.4 (L) 37.5 - 51.0 % Final     Platelets   Date Value Ref Range Status   11/15/2021 159 140 - 450 10*3/mm3 Final     Sodium   Date Value Ref Range Status   11/15/2021 139 136 - 145 mmol/L Final     Potassium   Date Value Ref Range Status   11/15/2021 3.7 3.5 - 5.2 mmol/L Final     Chloride   Date Value Ref Range Status   11/15/2021 105 98 - 107 mmol/L Final     CO2    Date Value Ref Range Status   11/15/2021 26.7 22.0 - 29.0 mmol/L Final     BUN   Date Value Ref Range Status   11/15/2021 13 6 - 20 mg/dL Final     Creatinine   Date Value Ref Range Status   11/15/2021 1.02 0.76 - 1.27 mg/dL Final     Glucose   Date Value Ref Range Status   11/15/2021 280 (H) 65 - 99 mg/dL Final     Calcium   Date Value Ref Range Status   11/15/2021 7.9 (L) 8.6 - 10.5 mg/dL Final     Magnesium   Date Value Ref Range Status   11/15/2021 1.8 1.6 - 2.6 mg/dL Final     Phosphorus   Date Value Ref Range Status   11/15/2021 3.9 2.5 - 4.5 mg/dL Final     No results found for: AST, ALT, ALKPHOS  No results found for: APTT, INR  No results found for: COLORU, CLARITYU, SPECGRAV, PHUR, PROTEINUR, GLUCOSEU, KETONESU, BLOODU, NITRITE, LEUKOCYTESUR, BILIRUBINUR, UROBILINOGEN, RBCUA, WBCUA, BACTERIA, UACOMMENT  No results found for: TROPONINT, TROPONINI, BNP  No components found for: HGBA1C;2  No components found for: TSH;2    Imaging Results (All)     Procedure Component Value Units Date/Time    MRI Pituitary With & Without Contrast [[id number]] Collected: 11/15/21 1357     Updated: 11/15/21 1422    Narrative:      MRI PITUITARY W WO CONTRAST-     CLINICAL HISTORY: Follow-up possible cystic lesion adjacent to the  pituitary gland seen on previous imaging. Follow up minimal  periaqueductal T2 hyperintensity.     TECHNIQUE: Multiple axial T1, T2 and diffusion images were acquired.  Thin section coronal and sagittal T1 and T2-weighted images were  obtained through the sella turcica including T1-weighted images acquired  after intravenous injection of MultiHance contrast.      COMPARISON: MRIs of the brain dated 11/13/2021 and 1/6/2021.     FINDINGS: The brain and ventricular system appear structurally normal.  There is no evidence of recent or old infarct or hemorrhage. No foci of  restricted diffusion are identified in the brain or brainstem. The MRI  on 01/06/2021 showed minimal T2 high signal in the  periaqueductal white  matter. This is less prominent on the current study, and was most likely  due to artifact. The possible small cystic lesion located along the left  side of the pituitary gland shown on the previous MRI study is better  delineated on the current MRI due to the thin section T2-weighted images  and postcontrast T1-weighted images through the sella turcica. This  appears to represent CSF extending into the left side of the sella  turcica producing mild localized bone remodeling and widening of the  sella turcica on the left and perhaps minimal flattening of the left  side of the pituitary gland. In addition, the pituitary stalk is  deviated slightly toward the right. The findings are consistent with a  partially empty sella, or possibly a small arachnoid cyst. Regardless,  this is unlikely to be of any clinical significance. The pituitary gland  shows normal homogeneous enhancement. No abnormal enhancement is  identified within the brain parenchyma. There are no masses. The  paranasal sinuses are all well aerated. Normal flow voids are observed  in the major vascular structures.       Impression:      Localized widening of the left side of the sella turcica  containing CSF consistent with a small arachnoid cyst or simply a  partially empty sella. Otherwise unremarkable MRI of the brain with/to  the pituitary gland. Minimal T2 hyperintensity in the periaqueductal  white matter shown on previous imaging is less prominent, and was likely  artifactual     This report was finalized on 11/15/2021 2:19 PM by Dr. Roger Raya M.D.       MRI Brain Without Contrast [[id number]] Collected: 11/13/21 1625     Updated: 11/13/21 1656    Narrative:      MR SCAN OF THE BRAIN WITHOUT CONTRAST     HISTORY: Hyperglycemia. Blurred vision.     The MR scan was performed with sagittal and axial images without  contrast and compared to previous MRI scan dated 01/06/2021. The  ventricles are normal in size and midline.  Some previous T2 FLAIR  hyperintensity in the periaqueductal gray matter noted on 01/06/2021 is  less apparent on today's exam and remains nonspecific. There is no  evidence of intracranial hemorrhage or mass effect and the diffusion  sequence appears normal.     There are normal flow voids in the major vessels. There is a 7 x 10 mm  cystic structure abutting the left margin of the pituitary gland and the  medial margin of the cavernous carotid artery. It appears to be  displacing the cavernous carotid artery laterally. It does not abut the  optic chiasm but could potentially relate to the patient's symptoms of  visual changes.     CONCLUSION: Cystic structure abutting the left margin of the pituitary  gland and causing slight displacement of the cavernous carotid artery  laterally and measuring 7 x 10 mm. It shows no change in size from the  previous MRI scan of 01/06/2021. The previous scan was performed with  intravenous contrast and shows no abnormal enhancement. The findings  suggest a cystic lesion that may relate to the pituitary gland. The  pituitary gland is otherwise normal in appearance. Further evaluation  with pituitary protocol MRI may be helpful.     This report was finalized on 11/13/2021 4:53 PM by Dr. Pito Perez M.D.       XR Chest 1 View [[id number]] Collected: 11/11/21 0639     Updated: 11/11/21 0643    Narrative:      XR CHEST 1 VW-     Clinical: Hyperglycemia, generalized weakness     FINDINGS: Normal heart size. Mediastinum and froilan have a satisfactory  appearance. No effusion, edema or acute airspace disease. Lungs clear.     CONCLUSION: Normal chest     This report was finalized on 11/11/2021 6:40 AM by Dr. Kong Sandy M.D.         No results found for: SITE, ALLENTEST, PHART, OWN8ARB, PO2ART, QGK0CBA, BASEEXCESS, B0QJDUPC, HGBBG, HCTABG, OXYHEMOGLOBI, METHHGBN, CARBOXYHGB, CO2CT, BAROMETRIC, MODALITY, FIO2       Discharge Medications      New Medications      Instructions Start Date    b complex vitamins capsule   1 capsule, Oral, Daily      glucose blood test strip   test blood sugar 4 times a day      Glucose Management tablet   Use as needed for emergently low blood glucose levels. Formulary Compliance Approval      Gvoke HypoPen 1-Pack 1 MG/0.2ML solution auto-injector  Generic drug: Glucagon   Use as directed for emergently low blood glucose level.      HumaLOG KwikPen 100 UNIT/ML solution pen-injector  Generic drug: Insulin Lispro (1 Unit Dial)   3 Units, Subcutaneous, 3 Times Daily With Meals      insulin lispro 100 UNIT/ML injection  Commonly known as: ADMELOG   0-7 Units, Subcutaneous, 3 Times Daily Before Meals      insulin detemir 100 UNIT/ML injection  Commonly known as: LEVEMIR   12 Units, Subcutaneous, Nightly, Formulary Compliance Approval      Lantus SoloStar 100 UNIT/ML injection pen  Generic drug: Insulin Glargine   12 Units, Subcutaneous, Nightly      Microlet Lancets misc   Ok to substitute for the patients needs      NovoFine Plus Pen Needle 32G X 4 MM misc  Generic drug: Insulin Pen Needle   1 each, Subcutaneous, 4 Times Daily PRN, Formulary Compliance Approval      NovoLOG FlexPen 100 UNIT/ML solution pen-injector sc pen  Generic drug: insulin aspart   5 Units, Subcutaneous, 3 Times Daily With Meals, Formulary Compliance Approval         Stop These Medications    acetaminophen 500 MG tablet  Commonly known as: TYLENOL     ibuprofen 400 MG tablet  Commonly known as: ADVIL,MOTRIN     metFORMIN 500 MG tablet  Commonly known as: GLUCOPHAGE              Patient Instructions:       Future Appointments   Date Time Provider Department Center   11/19/2021  3:30 PM Ralf Albrecht MD [facility]         Follow-up Information     Ralf Albrecht MD Follow up in 1 week(s).    Specialty: Internal Medicine  Contact information:  [address] [place] [id number]  [phone]             Jd Pike MD Follow up in 2 week(s).    Specialty: Endocrinology  Contact  information:  [address]  [address] [address]  [phone]                         Discharge Order (From admission, onward)     Start     Ordered    11/15/21 0923  Discharge patient  Once        Expected Discharge Date: 11/15/21    Discharge Disposition: Home or Self Care    Physician of Record for Attribution - Please select from Treatment Team: CRISPIN GUTIERRES [[id number]]    Review needed by CMO to determine Physician of Record: No       Question Answer Comment   Physician of Record for Attribution - Please select from Treatment Team CRISPIN GUTIERRES    Review needed by CMO to determine Physician of Record No        11/15/21 0927                Diet Order   Procedures   • Diet Regular; Consistent Carbohydrate, Lactose Restricted       TEST  RESULTS PENDING AT DISCHARGE  Pending Labs     Order Current Status    ACTH In process    Anti-islet Cell Antibody In process    Glutamic Acid Decarboxylase In process            Discharge instructions:  Follow up with your primary care provider in 1-2 weeks with a cbc and cmp         Total time spent discharging patient including evaluation, post hospitalization follow up,  medication and post hospitalization instructions and education, total time exceeds 30 minutes.    Signed:  Crispin Gutierres MD  11/15/2021  17:26 EST    Electronically signed by Crispin Gutierres MD at 11/15/21 1737       Discharge Order (From admission, onward)     Start     Ordered    11/15/21 0923  Discharge patient  Once        Expected Discharge Date: 11/15/21    Discharge Disposition: Home or Self Care    Physician of Record for Attribution - Please select from Treatment Team: CRISPIN GUTIERRES [[id number]]    Review needed by CMO to determine Physician of Record: No       Question Answer Comment   Physician of Record for Attribution - Please select from Treatment Team CRISPIN GUTIERRES    Review needed by CMO to determine Physician of Record No        11/15/21 0927

## 2021-11-19 ENCOUNTER — OFFICE VISIT (OUTPATIENT)
Dept: FAMILY MEDICINE CLINIC | Facility: CLINIC | Age: 21
End: 2021-11-19

## 2021-11-19 VITALS
HEIGHT: 69 IN | RESPIRATION RATE: 16 BRPM | DIASTOLIC BLOOD PRESSURE: 78 MMHG | WEIGHT: 149.6 LBS | BODY MASS INDEX: 22.16 KG/M2 | SYSTOLIC BLOOD PRESSURE: 110 MMHG

## 2021-11-19 DIAGNOSIS — E78.1 HYPERTRIGLYCERIDEMIA: Chronic | ICD-10-CM

## 2021-11-19 DIAGNOSIS — D50.9 IRON DEFICIENCY ANEMIA, UNSPECIFIED IRON DEFICIENCY ANEMIA TYPE: Chronic | ICD-10-CM

## 2021-11-19 DIAGNOSIS — E10.10 DIABETIC KETOACIDOSIS WITHOUT COMA ASSOCIATED WITH TYPE 1 DIABETES MELLITUS (HCC): Primary | ICD-10-CM

## 2021-11-19 DIAGNOSIS — F90.1 ATTENTION DEFICIT HYPERACTIVITY DISORDER (ADHD), PREDOMINANTLY HYPERACTIVE TYPE: Chronic | ICD-10-CM

## 2021-11-19 DIAGNOSIS — S06.9X9S TRAUMATIC BRAIN INJURY WITH LOSS OF CONSCIOUSNESS, SEQUELA (HCC): Chronic | ICD-10-CM

## 2021-11-19 PROCEDURE — 99214 OFFICE O/P EST MOD 30 MIN: CPT | Performed by: INTERNAL MEDICINE

## 2021-11-22 ENCOUNTER — READMISSION MANAGEMENT (OUTPATIENT)
Dept: CALL CENTER | Facility: HOSPITAL | Age: 21
End: 2021-11-22

## 2021-11-22 NOTE — OUTREACH NOTE
Medical Week 2 Survey      Responses   [facility] patient discharged from? [place]   Does the patient have one of the following disease processes/diagnoses(primary or secondary)? Other   Week 2 attempt successful? Yes   Call start time 1550   Discharge diagnosis DKA, T1DM, anemia, cyst near pituitary gland   Call end time 1551   Meds reviewed with patient/caregiver? Yes   Is the patient taking all medications as directed (includes completed medication regime)? Yes   Has the patient kept scheduled appointments due by today? Yes   What is the patient's perception of their health status since discharge? Improving   Week 2 Call Completed? Yes   Graduated Yes   Is the patient interested in additional calls from an ambulatory ?  NOTE:  applies to high risk patients requiring additional follow-up. No   Did the patient feel the follow up calls were helpful during their recovery period? Yes   Was the number of calls appropriate? Yes   Graduated/Revoked comments Doing great.  BS prior to call was 160, has had follow up, no needs or questions at this time.  States he feels so much better.          Cherise Osorio RN

## 2021-11-23 LAB
ALBUMIN SERPL-MCNC: 4.5 G/DL (ref 4.1–5.2)
ALBUMIN/GLOB SERPL: 1.6 {RATIO} (ref 1.2–2.2)
ALP SERPL-CCNC: 79 IU/L (ref 44–121)
ALT SERPL-CCNC: 178 IU/L (ref 0–44)
AST SERPL-CCNC: 59 IU/L (ref 0–40)
BASOPHILS # BLD AUTO: 0 X10E3/UL (ref 0–0.2)
BASOPHILS NFR BLD AUTO: 1 %
BILIRUB SERPL-MCNC: <0.2 MG/DL (ref 0–1.2)
BUN SERPL-MCNC: 21 MG/DL (ref 6–20)
BUN/CREAT SERPL: 18 (ref 9–20)
CALCIUM SERPL-MCNC: 10 MG/DL (ref 8.7–10.2)
CHLORIDE SERPL-SCNC: 100 MMOL/L (ref 96–106)
CHOLEST SERPL-MCNC: 197 MG/DL (ref 100–199)
CHOLEST/HDLC SERPL: 3 RATIO (ref 0–5)
CO2 SERPL-SCNC: 24 MMOL/L (ref 20–29)
CREAT SERPL-MCNC: 1.15 MG/DL (ref 0.76–1.27)
EOSINOPHIL # BLD AUTO: 0.1 X10E3/UL (ref 0–0.4)
EOSINOPHIL NFR BLD AUTO: 3 %
ERYTHROCYTE [DISTWIDTH] IN BLOOD BY AUTOMATED COUNT: 15 % (ref 11.6–15.4)
GLOBULIN SER CALC-MCNC: 2.8 G/DL (ref 1.5–4.5)
GLUCOSE SERPL-MCNC: 233 MG/DL (ref 65–99)
HCT VFR BLD AUTO: 41 % (ref 37.5–51)
HDLC SERPL-MCNC: 65 MG/DL
HGB BLD-MCNC: 13.8 G/DL (ref 13–17.7)
IMM GRANULOCYTES # BLD AUTO: 0 X10E3/UL (ref 0–0.1)
IMM GRANULOCYTES NFR BLD AUTO: 0 %
LDLC SERPL CALC-MCNC: 114 MG/DL (ref 0–99)
LYMPHOCYTES # BLD AUTO: 1.9 X10E3/UL (ref 0.7–3.1)
LYMPHOCYTES NFR BLD AUTO: 40 %
MCH RBC QN AUTO: 30.1 PG (ref 26.6–33)
MCHC RBC AUTO-ENTMCNC: 33.7 G/DL (ref 31.5–35.7)
MCV RBC AUTO: 90 FL (ref 79–97)
MONOCYTES # BLD AUTO: 0.5 X10E3/UL (ref 0.1–0.9)
MONOCYTES NFR BLD AUTO: 10 %
NEUTROPHILS # BLD AUTO: 2.2 X10E3/UL (ref 1.4–7)
NEUTROPHILS NFR BLD AUTO: 46 %
PLATELET # BLD AUTO: 240 X10E3/UL (ref 150–450)
POTASSIUM SERPL-SCNC: 4.4 MMOL/L (ref 3.5–5.2)
PROT SERPL-MCNC: 7.3 G/DL (ref 6–8.5)
RBC # BLD AUTO: 4.58 X10E6/UL (ref 4.14–5.8)
SODIUM SERPL-SCNC: 137 MMOL/L (ref 134–144)
TRIGL SERPL-MCNC: 101 MG/DL (ref 0–149)
VLDLC SERPL CALC-MCNC: 18 MG/DL (ref 5–40)
WBC # BLD AUTO: 4.7 X10E3/UL (ref 3.4–10.8)

## 2021-11-23 RX ORDER — ATORVASTATIN CALCIUM 20 MG/1
TABLET, FILM COATED ORAL
Qty: 90 TABLET | Refills: 2 | Status: SHIPPED | OUTPATIENT
Start: 2021-11-23 | End: 2022-08-16
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
## 2021-11-29 ENCOUNTER — OFFICE VISIT (OUTPATIENT)
Dept: ENDOCRINOLOGY | Age: 21
End: 2021-11-29

## 2021-11-29 VITALS
HEIGHT: 70 IN | HEART RATE: 69 BPM | DIASTOLIC BLOOD PRESSURE: 70 MMHG | BODY MASS INDEX: 22.79 KG/M2 | WEIGHT: 159.2 LBS | OXYGEN SATURATION: 99 % | SYSTOLIC BLOOD PRESSURE: 110 MMHG

## 2021-11-29 DIAGNOSIS — E23.6 EMPTY SELLA (HCC): ICD-10-CM

## 2021-11-29 DIAGNOSIS — H54.40 BLINDNESS OF RIGHT EYE WITH NORMAL VISION IN CONTRALATERAL EYE: ICD-10-CM

## 2021-11-29 DIAGNOSIS — E10.9 TYPE 1 DIABETES MELLITUS WITHOUT COMPLICATION (HCC): Primary | ICD-10-CM

## 2021-11-29 DIAGNOSIS — R76.8 POSITIVE GAD ANTIBODY: ICD-10-CM

## 2021-11-29 PROBLEM — R93.89 ABNORMAL MRI: Status: RESOLVED | Noted: 2021-11-14 | Resolved: 2021-11-29

## 2021-11-29 PROBLEM — F31.81 BIPOLAR II DISORDER: Status: RESOLVED | Noted: 2018-01-12 | Resolved: 2021-11-29

## 2021-11-29 PROCEDURE — 99205 OFFICE O/P NEW HI 60 MIN: CPT | Performed by: INTERNAL MEDICINE

## 2021-11-29 RX ORDER — PROCHLORPERAZINE 25 MG/1
1 SUPPOSITORY RECTAL
Qty: 1 EACH | Refills: 3 | Status: SHIPPED | OUTPATIENT
Start: 2021-11-29 | End: 2021-12-14 | Stop reason: SDUPTHER

## 2021-11-29 RX ORDER — INSULIN LISPRO 100 [IU]/ML
INJECTION, SOLUTION INTRAVENOUS; SUBCUTANEOUS
Qty: 10 PEN | Refills: 1 | Status: SHIPPED | OUTPATIENT
Start: 2021-11-29 | End: 2021-11-30

## 2021-11-29 RX ORDER — PROCHLORPERAZINE 25 MG/1
SUPPOSITORY RECTAL
Qty: 9 EACH | Refills: 2 | Status: SHIPPED | OUTPATIENT
Start: 2021-11-29 | End: 2021-12-14 | Stop reason: SDUPTHER

## 2021-11-29 RX ORDER — INSULIN GLARGINE 100 [IU]/ML
16 INJECTION, SOLUTION SUBCUTANEOUS NIGHTLY
Qty: 10 PEN | Refills: 1 | Status: SHIPPED | OUTPATIENT
Start: 2021-11-29 | End: 2022-04-08

## 2021-11-29 RX ORDER — PROCHLORPERAZINE 25 MG/1
1 SUPPOSITORY RECTAL DAILY
Qty: 1 EACH | Refills: 1 | Status: SHIPPED | OUTPATIENT
Start: 2021-11-29 | End: 2022-05-31

## 2021-11-29 RX ORDER — INSULIN GLARGINE 100 [IU]/ML
16 INJECTION, SOLUTION SUBCUTANEOUS NIGHTLY
Qty: 15 ML | Refills: 1 | Status: SHIPPED | OUTPATIENT
Start: 2021-11-29 | End: 2021-11-29

## 2021-11-29 NOTE — ADDENDUM NOTE
Addended by: EFFIE SHARMA on: 11/29/2021 06:11 PM     Modules accepted: Level of Service, SmartSet

## 2021-11-29 NOTE — PROGRESS NOTES
Macey Sparks is a 21 y.o. male.     History of Present Illness     Patient is a 21-year-old male who was referred for diabetes control.    He was diagnosed to have diabetes mellitus in October 2021 and was initially treated with Metformin.  He was admitted to the hospital in November 2021 and diabetic ketoacidosis and insulin was started.  MARLENI antibody was elevated.  Hemoglobin A1c in November 2021 is 13.8%.  He is on Lantus 14 units every evening and Humalog 3 units 3 times a day with a sliding scale.  Sliding scale insulin as follows: 150- 199-2 units.  200-249-3 units.  250-299-4 units.  300-349-5.  350-400-6 units.  Greater than 407 units.  Fasting glucose has been greater than 200.  He checks his blood sugar at least 3 times a day.  He has no early morning hypoglycemia.  He is no longer polydipsic or nocturia.  He has seen the dietitian and diabetes educator.  His last meal was 7:15 AM    His last eye examination was 2 years ago.  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  He has intermittent blurry vision whenever his blood sugar goes high.  He denies numbness, tingling or burning in his hands or feet.    He has hyperlipidemia and was recently started on atorvastatin 20 mg/day.  Lipid panel before atorvastatin was started are as follows: Cholesterol 197.  .  Triglycerides 101.    MRI of the pituitary done in November 2021 showed a localized widening of the left side of the sella turcica containing his CSF consistent with small arachnoid cyst or partially empty sella.  Lab studies done in November 2021 are as follows: Normal prolactin at 14.30.  Normal TSH of 0.966.  Normal free T4 of 1.36 ng per DL.  Normal free T3 at 2.43 pg/mL.  He was seen by Dr. Ford and has a follow-up with him.    He denies easy bruising.  He denies muscle weakness.  He denies heat or cold intolerance.  He denies bowel changes.  He denies changes in shoes or ring size.    He has  "fathered 2 children with the youngest age 7 months.      The following portions of the patient's history were reviewed and updated as appropriate: allergies, current medications, past family history, past medical history, past social history, past surgical history and problem list.    Review of Systems   Eyes: Positive for visual disturbance.   Respiratory: Negative for shortness of breath and wheezing.    Cardiovascular: Negative for chest pain and palpitations.   Gastrointestinal: Negative.    Endocrine: Negative for cold intolerance, heat intolerance, polydipsia and polyuria.   Genitourinary: Negative.    Musculoskeletal: Negative for myalgias.   Neurological: Negative for numbness.     Objective      Vitals:    11/29/21 0809   BP: 110/70   Pulse: 69   SpO2: 99%   Weight: 72.2 kg (159 lb 3.2 oz)   Height: 176.5 cm (69.5\")     Physical Exam  Constitutional:       General: He is not in acute distress.     Appearance: Normal appearance. He is obese. He is not ill-appearing, toxic-appearing or diaphoretic.   HENT:      Nose: No congestion or rhinorrhea.      Mouth/Throat:      Pharynx: No oropharyngeal exudate or posterior oropharyngeal erythema.   Eyes:      General: No scleral icterus.        Right eye: No discharge.         Left eye: No discharge.      Conjunctiva/sclera: Conjunctivae normal.   Neck:      Vascular: No carotid bruit.   Cardiovascular:      Rate and Rhythm: Normal rate and regular rhythm.      Pulses: Normal pulses.      Heart sounds: Normal heart sounds. No murmur heard.  No friction rub.   Pulmonary:      Effort: Pulmonary effort is normal. No respiratory distress.      Breath sounds: Normal breath sounds. No stridor. No rales.   Chest:      Chest wall: No tenderness.   Abdominal:      General: Bowel sounds are normal. There is no distension.      Palpations: Abdomen is soft. There is no mass.      Tenderness: There is no right CVA tenderness or left CVA tenderness.      Comments: No prominent " striae   Genitourinary:     Penis: Normal.    Musculoskeletal:         General: Normal range of motion.      Cervical back: Neck supple. No rigidity or tenderness.      Right lower leg: No edema.      Left lower leg: No edema.   Lymphadenopathy:      Cervical: No cervical adenopathy.   Skin:     General: Skin is warm and dry.   Neurological:      Mental Status: He is alert.      Comments: Able to get up from a deep squat.   Psychiatric:         Mood and Affect: Mood normal.         Behavior: Behavior normal.       Results Encounter on 11/24/2021   Component Date Value Ref Range Status   • Magnesium 11/22/2021 1.8  1.6 - 2.3 mg/dL Final     Assessment/Plan   Diagnoses and all orders for this visit:    1. Type 1 diabetes mellitus without complication (HCC) (Primary)  -     Microalbumin / Creatinine Urine Ratio - Urine, Clean Catch  -     Comprehensive Metabolic Panel    2. Empty sella (HCC)  -     Testosterone, Free / Tot Equilib  -     Follicle Stimulating Hormone  -     Luteinizing Hormone    3. 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    4. Positive MARLENI antibody  -     Microalbumin / Creatinine Urine Ratio - Urine, Clean Catch  -     Comprehensive Metabolic Panel    Other orders  -     Discontinue: Insulin Glargine (Lantus SoloStar) 100 UNIT/ML injection pen; Inject 16 Units under the skin into the appropriate area as directed Every Night for 30 days.  Dispense: 15 mL; Refill: 1  -     Insulin Glargine (Lantus SoloStar) 100 UNIT/ML injection pen; Inject 16 Units under the skin into the appropriate area as directed Every Night for 30 days.  Dispense: 10 pen; Refill: 1  -     Continuous Blood Gluc Sensor (Dexcom G6 Sensor); Every 10 (Ten) Days.  Dispense: 9 each; Refill: 2  -     Continuous Blood Gluc  (Dexcom G6 ) device; 1 each Daily.  Dispense: 1 each; Refill: 1  -     Continuous Blood Gluc Transmit (Dexcom G6 Transmitter) misc; 1 each Every 3 (Three) Months.  Dispense: 1  each; Refill: 3  -     Insulin Lispro, 1 Unit Dial, (HumaLOG KwikPen) 100 UNIT/ML solution pen-injector; 10 units 3 times daily with meals.  Take extra insulin on a sliding scale.  Maximum 10 units per dose.  Dispense: 10 pen; Refill: 1      Increase Lantus to 16 units every evening.  Continue NovoLog 3 units with each meal plus sliding scale.  Prescription for Dexcom 6 sensor was sent to the pharmacist.  Continue Lipitor 20 mg/day.  Follow-up with Dr. Ford as scheduled.  Advised to have an eye examination.    Copy my note sent to Dr. Albrecht and Dr. Ford.    RTC 4-6 weeks with Shelbi Lechuga NP.    Total time 75 minutes.

## 2021-11-30 RX ORDER — INSULIN ASPART 100 [IU]/ML
INJECTION, SOLUTION INTRAVENOUS; SUBCUTANEOUS
Qty: 45 ML | Refills: 1 | Status: SHIPPED | OUTPATIENT
Start: 2021-11-30 | End: 2022-02-28 | Stop reason: ALTCHOICE

## 2021-12-01 ENCOUNTER — TELEPHONE (OUTPATIENT)
Dept: FAMILY MEDICINE CLINIC | Facility: CLINIC | Age: 21
End: 2021-12-01

## 2021-12-01 LAB
ALBUMIN SERPL-MCNC: 4.3 G/DL (ref 4.1–5.2)
ALBUMIN/CREAT UR: <3 MG/G CREAT (ref 0–29)
ALBUMIN/GLOB SERPL: 1.5 {RATIO} (ref 1.2–2.2)
ALP SERPL-CCNC: 84 IU/L (ref 44–121)
ALT SERPL-CCNC: 64 IU/L (ref 0–44)
AST SERPL-CCNC: 34 IU/L (ref 0–40)
BILIRUB SERPL-MCNC: 0.3 MG/DL (ref 0–1.2)
BUN SERPL-MCNC: 14 MG/DL (ref 6–20)
BUN/CREAT SERPL: 13 (ref 9–20)
CALCIUM SERPL-MCNC: 9.5 MG/DL (ref 8.7–10.2)
CHLORIDE SERPL-SCNC: 101 MMOL/L (ref 96–106)
CO2 SERPL-SCNC: 24 MMOL/L (ref 20–29)
CREAT SERPL-MCNC: 1.11 MG/DL (ref 0.76–1.27)
CREAT UR-MCNC: 91.9 MG/DL
FSH SERPL-ACNC: 1.2 MIU/ML (ref 1.5–12.4)
GLOBULIN SER CALC-MCNC: 2.8 G/DL (ref 1.5–4.5)
GLUCOSE SERPL-MCNC: 223 MG/DL (ref 65–99)
LH SERPL-ACNC: 3.3 MIU/ML (ref 1.7–8.6)
MICROALBUMIN UR-MCNC: <3 UG/ML
POTASSIUM SERPL-SCNC: 4.3 MMOL/L (ref 3.5–5.2)
PROT SERPL-MCNC: 7.1 G/DL (ref 6–8.5)
SODIUM SERPL-SCNC: 138 MMOL/L (ref 134–144)
TESTOST FREE MFR SERPL: 2.34 % (ref 1.5–4.2)
TESTOST FREE SERPL-MCNC: 9.34 NG/DL (ref 5–21)
TESTOST SERPL-MCNC: 399 NG/DL (ref 264–916)

## 2021-12-01 NOTE — TELEPHONE ENCOUNTER
I talked to the patient's mom who relates that the patient is seeing Dr. Pike for his diabetes.  Unfortunately the patient continues to be noncompliant.  We will see the patient back in follow-up in the next 2 weeks or so.

## 2021-12-10 DIAGNOSIS — Z13.39 ADHD (ATTENTION DEFICIT HYPERACTIVITY DISORDER) EVALUATION: Primary | ICD-10-CM

## 2021-12-14 RX ORDER — PROCHLORPERAZINE 25 MG/1
1 SUPPOSITORY RECTAL
Qty: 1 EACH | Refills: 3 | Status: SHIPPED | OUTPATIENT
Start: 2021-12-14 | End: 2022-12-20 | Stop reason: SDUPTHER

## 2021-12-14 RX ORDER — PROCHLORPERAZINE 25 MG/1
SUPPOSITORY RECTAL
Qty: 9 EACH | Refills: 3 | Status: SHIPPED | OUTPATIENT
Start: 2021-12-14 | End: 2021-12-17 | Stop reason: SDUPTHER

## 2021-12-16 ENCOUNTER — OFFICE VISIT (OUTPATIENT)
Dept: FAMILY MEDICINE CLINIC | Facility: CLINIC | Age: 21
End: 2021-12-16

## 2021-12-16 VITALS
SYSTOLIC BLOOD PRESSURE: 120 MMHG | BODY MASS INDEX: 22.1 KG/M2 | HEIGHT: 70 IN | RESPIRATION RATE: 16 BRPM | DIASTOLIC BLOOD PRESSURE: 72 MMHG | WEIGHT: 154.4 LBS

## 2021-12-16 DIAGNOSIS — F98.4 ATYPICAL STEREOTYPED MOVEMENT DISORDER: Chronic | ICD-10-CM

## 2021-12-16 DIAGNOSIS — E10.10 DIABETIC KETOACIDOSIS WITHOUT COMA ASSOCIATED WITH TYPE 1 DIABETES MELLITUS (HCC): Primary | Chronic | ICD-10-CM

## 2021-12-16 PROCEDURE — 99214 OFFICE O/P EST MOD 30 MIN: CPT | Performed by: INTERNAL MEDICINE
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
## 2021-12-20 RX ORDER — PROCHLORPERAZINE 25 MG/1
SUPPOSITORY RECTAL
Qty: 9 EACH | Refills: 3 | Status: SHIPPED | OUTPATIENT
Start: 2021-12-20 | End: 2022-12-20 | Stop reason: SDUPTHER

## 2021-12-27 ENCOUNTER — OFFICE VISIT (OUTPATIENT)
Dept: ENDOCRINOLOGY | Age: 21
End: 2021-12-27

## 2021-12-27 VITALS
WEIGHT: 155.8 LBS | BODY MASS INDEX: 22.3 KG/M2 | OXYGEN SATURATION: 97 % | HEIGHT: 70 IN | HEART RATE: 72 BPM | SYSTOLIC BLOOD PRESSURE: 128 MMHG | DIASTOLIC BLOOD PRESSURE: 72 MMHG | RESPIRATION RATE: 20 BRPM

## 2021-12-27 DIAGNOSIS — E10.65 TYPE 1 DIABETES MELLITUS WITH HYPERGLYCEMIA (HCC): Primary | ICD-10-CM

## 2021-12-27 DIAGNOSIS — E78.49 OTHER HYPERLIPIDEMIA: ICD-10-CM

## 2021-12-27 DIAGNOSIS — E53.8 VITAMIN B 12 DEFICIENCY: ICD-10-CM

## 2021-12-27 DIAGNOSIS — R76.8 POSITIVE GAD ANTIBODY: ICD-10-CM

## 2021-12-27 PROCEDURE — 99214 OFFICE O/P EST MOD 30 MIN: CPT | Performed by: NURSE PRACTITIONER

## 2021-12-27 PROCEDURE — 95251 CONT GLUC MNTR ANALYSIS I&R: CPT | Performed by: NURSE PRACTITIONER

## 2021-12-27 NOTE — PROGRESS NOTES
"Chief Complaint  Chief Complaint   Patient presents with   • Diabetes     type 1     Mom was at visit with pt.    Subjective          History of Present Illness    Raquel Sparks 21 y.o. presents for a follow-up evaluation for type 1 DM    He has been diabetic since October 2021 and started insulin in November 2021    Pt is on the following medications for their DM:  Lantus 16 units every evening and Novolog 3 units 3 times a day with a sliding scale.    Sliding scale insulin as follows: 150- 199=2 units.  200-249=3 units.  250-299=4 units.  300-349=5.  350-400=6 units.  Greater than 400 =7 units      Pt complains of Blurry vision with hyperglycemia.    Denies diarrhea or constipation, difficulty breathing,chest pain, palpitations, vision changes, numbness and tingling in feet/hands.      Pt does/not have a history of DM retinopathy.  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    Pt does not have a history of nephropathy.  Patient is not currently taking ACE/ARB    Pt does not have neuropathy.    Pt does not have a history of CAD or CVA.    Last A1C in 11/21 was 13.80    Last microalbumin in 11/21 was negative      Blood Sugars    Blood glucoses are checked via dexcom.    Fasting blood glucoses:180-mid 200s    Pre-meal blood glucoses: 200s    Pt has rare episodes of hypoglycemia.      Sensor Data    Time in range 37%  High 28%  Very high 34%  Low <1%  Very low 0%      Average Glucose - 221 mg/dL      Sensor Wear - 93 %            Hyperlipidemia     Pt denies any muscle/body aches, chest pain, or shortness of breath    Pt is currently not taking atorvastatin 20 mg HS - pt states that he's \"no a medication person\"    Last lipid panel in 11/21 showed Total 197, Triglycerides 101, HDL 65 and  - these were before treatment with statin        I have reviewed the patient's allergies, medicines, past medical hx, family hx and social hx.    Objective   Vital Signs:   BP " "128/72   Pulse 72   Resp 20   Ht 177.8 cm (70\")   Wt 70.7 kg (155 lb 12.8 oz)   SpO2 97%   BMI 22.35 kg/m²       Physical Exam   Physical Exam  Constitutional:       General: He is not in acute distress.     Appearance: Normal appearance. He is not diaphoretic.   HENT:      Head: Normocephalic and atraumatic.   Eyes:      General:         Right eye: No discharge.         Left eye: No discharge.   Cardiovascular:      Rate and Rhythm: Normal rate and regular rhythm.      Heart sounds: Normal heart sounds. No murmur heard.  No friction rub. No gallop.    Pulmonary:      Effort: Pulmonary effort is normal. No respiratory distress.      Breath sounds: Normal breath sounds. No wheezing.   Skin:     General: Skin is warm and dry.   Neurological:      Mental Status: He is alert.   Psychiatric:         Mood and Affect: Mood normal.         Behavior: Behavior normal.                    Results Review:   Hemoglobin A1C   Date Value Ref Range Status   11/11/2021 13.80 (H) 4.80 - 5.60 % Final     Triglycerides   Date Value Ref Range Status   11/22/2021 101 0 - 149 mg/dL Final     HDL Cholesterol   Date Value Ref Range Status   11/22/2021 65 >39 mg/dL Final     LDL Chol Calc (NIH)   Date Value Ref Range Status   11/22/2021 114 (H) 0 - 99 mg/dL Final     VLDL Cholesterol Jeffrey   Date Value Ref Range Status   11/22/2021 18 5 - 40 mg/dL Final         Assessment and Plan {CC Problem List  Visit Diagnosis  ROS  Review (Popup)  Health Maintenance  Quality  BestPractice  Medications  SmartSets  SnapShot Encounters  Media :23  Diagnoses and all orders for this visit:    1. Type 1 diabetes mellitus with hyperglycemia (HCC) (Primary)    Increase Lantus to 18 units daily  Increase Novolog to 3 units with breakfast and Lunch, plus SS  Increase novolog to 6 units with dinner, plus SS  Continue with Dexcom - change low to 70  Patient states that he is eating 5 times a day, but was only taking insulin with breakfast, lunch and " dinner.  Advised to take 1-2 units of Novolog with snacks  Reviewed the rules of 15  Also reviewed how to treat hyperglycemia      2. Positive MARLENI antibody    Make changes with insulin noted above      3. Vitamin B 12 deficiency    Not on replacement at this time  Advised to increase PO intake if not going to take supplement  Continue to monitor      4. Other hyperlipidemia    Pt not taking statin at this time  Talked to patient and mother about his elevated LDL and why statin treatment is important to diabetics  Restart statin - advised to take at bedtime           No refills needed at this time      RTC in 4-6 weeks with me and 3 months with Dr. Pike      Follow Up     Patient was given instructions and counseling regarding her condition or for health maintenance advice. Please see specific information pulled into the AVS if appropriate.              Shelbi Lechuga, APRN  12/27/21

## 2022-01-10 ENCOUNTER — TELEPHONE (OUTPATIENT)
Dept: FAMILY MEDICINE CLINIC | Facility: CLINIC | Age: 22
End: 2022-01-10

## 2022-01-10 NOTE — TELEPHONE ENCOUNTER
Caller: Kaylynn Sparks    Relationship: Mother    Best call back number: [phone]    What is the best time to reach you: ANYTIME    Who are you requesting to speak with (clinical staff, provider,  specific staff member): DR. GEORGE    What was the call regarding: PATIENTS MOM STATES THE PATIENT HAS BEEN SICK A WEEK AND A HALF NOW WITH A SORE THROAT, CONGESTION, COUGHING AND LOSING HIS VOICE. PATIENT WAS TESTED FOR STREP AT [facility] AND IT CAME BACK NEGATIVE. THE PATIENT WAS ADVISED BY [facility] TO TAKE NYQUIL TO HELP HIS SYMPTOMS BUT THE PATIENTS MOM DOES NOT KNOW IF HE SHOULD BE TAKING THE NYQUIL OR NOT BECAUSE OF THE PATIENTS DIABETES.     PATIENTS MOM WANTS TO KNOW WHAT SHE SHOULD DO. IF SHE NEEDS TO HAVE THE PATIENT COME IN AND BE SEEN OR IF THERE IS ANOTHER MEDICATION THEY SHOULD GET FOR THE PATIENT.     PLEASE CALL AND ADVISE.

## 2022-01-11 NOTE — TELEPHONE ENCOUNTER
I called the patient's mother back at [phone] and left a message that it would be okay to take the NyQuil.

## 2022-01-13 NOTE — PROGRESS NOTES
Subjective   Patient ID: Raquel Sparsk is a 21 y.o. male is here today for a HFU for a possible arachnoid cyst.  MRI brain done on 11/13/21 .    11/15/21 MRI Pituitary [facility] ordered by Tegan  11/13/21 MRI Brain    Today patient reports here for a hospital follow up.    This patient is here with his mother.  I saw him a few months ago because of some visual blurring.  He was in the hospital for DKA and I felt that the visual symptoms were probably result of his elevated blood sugars.  Brain and pituitary MRI was done which showed a pituitary cyst mainly off to the left side.  It seems to be the same compared to a scan that was done 10 months earlier and ordered by his primary care physician.  I felt that these were incidental findings and just recommended follow-up in the office and ultimately follow-up imaging.  He is blurring is better essentially gone.  His blood sugars are under much better control.  He has no extraocular movement disorder and his visual field testing to confrontation is normal.  I told him that this was likely incidental in the first place.  But we will get another scan in about 8 months which will be 10 months from the previous one.  If that 1 is stable we can probably stretch it out to 2 years.        History of Present Illness    The following portions of the patient's history were reviewed and updated as appropriate: allergies, current medications, past family history, past medical history, past social history, past surgical history and problem list.    Review of Systems   Constitutional: Negative for fever.   All other systems reviewed and are negative.      Objective   Physical Exam  Constitutional:       Appearance: He is well-developed.   HENT:      Head: Normocephalic and atraumatic.   Eyes:      Extraocular Movements: EOM normal.      Conjunctiva/sclera: Conjunctivae normal.      Pupils: Pupils are equal, round, and reactive to light.   Neck:      Vascular: No carotid bruit.    Neurological:      Mental Status: He is oriented to person, place, and time.      Coordination: Finger-Nose-Finger Test and Heel to Shin Test normal.      Gait: Gait is intact.      Deep Tendon Reflexes:      Reflex Scores:       Tricep reflexes are 2+ on the right side and 2+ on the left side.       Bicep reflexes are 2+ on the right side and 2+ on the left side.       Brachioradialis reflexes are 2+ on the right side and 2+ on the left side.       Patellar reflexes are 2+ on the right side and 2+ on the left side.       Achilles reflexes are 2+ on the right side and 2+ on the left side.  Psychiatric:         Speech: Speech normal.       Neurologic Exam     Mental Status   Oriented to person, place, and time.   Registration of memory: Good recent and remote memory.   Attention: normal. Concentration: normal.   Speech: speech is normal   Level of consciousness: alert  Knowledge: consistent with education.     Cranial Nerves     CN II   Visual fields full to confrontation.   Visual acuity: normal    CN III, IV, VI   Pupils are equal, round, and reactive to light.  Extraocular motions are normal.     CN V   Facial sensation intact.   Right corneal reflex: normal  Left corneal reflex: normal    CN VII   Facial expression full, symmetric.   Right facial weakness: none  Left facial weakness: none    CN VIII   Hearing: intact    CN IX, X   Palate: symmetric    CN XI   Right sternocleidomastoid strength: normal  Left sternocleidomastoid strength: normal    CN XII   Tongue: not atrophic  Tongue deviation: none    Motor Exam   Muscle bulk: normal  Right arm tone: normal  Left arm tone: normal  Right leg tone: normal  Left leg tone: normal    Strength   Strength 5/5 except as noted.     Sensory Exam   Light touch normal.     Gait, Coordination, and Reflexes     Gait  Gait: normal    Coordination   Finger to nose coordination: normal  Heel to shin coordination: normal    Reflexes   Right brachioradialis: 2+  Left  brachioradialis: 2+  Right biceps: 2+  Left biceps: 2+  Right triceps: 2+  Left triceps: 2+  Right patellar: 2+  Left patellar: 2+  Right achilles: 2+  Left achilles: 2+  Right : 2+  Left : 2+      Assessment/Plan   Independent Review of Radiographic Studies:      The MRI done on 11/15/2021 shows a left-sided pituitary cyst or possibly a partially empty sella.  It appears not that much different from the scan done on 1/6/2021.  Agree with the report.        Medical Decision Making:      His visual blurring has resolved since his sugars have been more normalized.  Again I think that was originally related to his DKA.  But we will continue to follow him for this incidental radiographic discovery and get another MRI of the pituitary in about 8 months.  If that one is stable then we can perhaps look at it 2 years afterwards.      Diagnoses and all orders for this visit:    1. Pituitary cyst (HCC) (Primary)  -     MRI pituitary w wo contrast; Future      Return in about 8 months (around 9/17/2022) for After MRI is done.

## 2022-01-17 ENCOUNTER — OFFICE VISIT (OUTPATIENT)
Dept: NEUROSURGERY | Facility: CLINIC | Age: 22
End: 2022-01-17

## 2022-01-17 VITALS
TEMPERATURE: 97.9 F | BODY MASS INDEX: 22.79 KG/M2 | HEIGHT: 70 IN | SYSTOLIC BLOOD PRESSURE: 118 MMHG | DIASTOLIC BLOOD PRESSURE: 72 MMHG | OXYGEN SATURATION: 97 % | WEIGHT: 159.2 LBS | HEART RATE: 68 BPM

## 2022-01-17 DIAGNOSIS — E23.6 PITUITARY CYST: Primary | ICD-10-CM

## 2022-01-17 PROCEDURE — 99213 OFFICE O/P EST LOW 20 MIN: CPT | Performed by: NEUROLOGICAL SURGERY

## 2022-01-26 ENCOUNTER — OFFICE VISIT (OUTPATIENT)
Dept: FAMILY MEDICINE CLINIC | Facility: CLINIC | Age: 22
End: 2022-01-26

## 2022-01-26 VITALS
HEIGHT: 70 IN | BODY MASS INDEX: 22.05 KG/M2 | RESPIRATION RATE: 16 BRPM | DIASTOLIC BLOOD PRESSURE: 70 MMHG | SYSTOLIC BLOOD PRESSURE: 110 MMHG | WEIGHT: 154 LBS

## 2022-01-26 DIAGNOSIS — E10.65 TYPE 1 DIABETES MELLITUS WITH HYPERGLYCEMIA: Chronic | ICD-10-CM

## 2022-01-26 DIAGNOSIS — D35.2 PITUITARY ADENOMA: Primary | Chronic | ICD-10-CM

## 2022-01-26 PROCEDURE — 99213 OFFICE O/P EST LOW 20 MIN: CPT | Performed by: INTERNAL MEDICINE
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
## 2022-02-07 ENCOUNTER — OFFICE VISIT (OUTPATIENT)
Dept: ENDOCRINOLOGY | Age: 22
End: 2022-02-07

## 2022-02-07 VITALS
DIASTOLIC BLOOD PRESSURE: 78 MMHG | HEIGHT: 70 IN | WEIGHT: 156 LBS | SYSTOLIC BLOOD PRESSURE: 110 MMHG | HEART RATE: 70 BPM | OXYGEN SATURATION: 98 % | BODY MASS INDEX: 22.33 KG/M2

## 2022-02-07 DIAGNOSIS — E10.65 TYPE 1 DIABETES MELLITUS WITH HYPERGLYCEMIA: Primary | ICD-10-CM

## 2022-02-07 DIAGNOSIS — E53.8 VITAMIN B 12 DEFICIENCY: ICD-10-CM

## 2022-02-07 DIAGNOSIS — E78.49 OTHER HYPERLIPIDEMIA: ICD-10-CM

## 2022-02-07 PROCEDURE — 99214 OFFICE O/P EST MOD 30 MIN: CPT | Performed by: NURSE PRACTITIONER

## 2022-02-07 RX ORDER — INSULIN PUMP CONTROLLER
1 EACH MISCELLANEOUS
Qty: 10 EACH | Refills: 3 | Status: SHIPPED | OUTPATIENT
Start: 2022-02-07 | End: 2022-04-26 | Stop reason: SINTOL
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
## 2022-02-14 ENCOUNTER — HOSPITAL ENCOUNTER (OUTPATIENT)
Dept: MRI IMAGING | Facility: HOSPITAL | Age: 22
Discharge: HOME | End: 2022-02-14
Admitting: NEUROLOGICAL SURGERY

## 2022-02-14 DIAGNOSIS — E23.6 PITUITARY CYST: ICD-10-CM

## 2022-02-14 PROCEDURE — A9577 INJ MULTIHANCE: HCPCS | Performed by: NEUROLOGICAL SURGERY

## 2022-02-14 PROCEDURE — 70553 MRI BRAIN STEM W/O & W/DYE: CPT

## 2022-02-14 PROCEDURE — 0 GADOBENATE DIMEGLUMINE 529 MG/ML SOLUTION: Performed by: NEUROLOGICAL SURGERY

## 2022-02-14 RX ADMIN — GADOBENATE DIMEGLUMINE 14 ML: 529 INJECTION, SOLUTION INTRAVENOUS at 09:06

## 2022-02-16 ENCOUNTER — SPECIALTY PHARMACY (OUTPATIENT)
Dept: ENDOCRINOLOGY | Age: 22
End: 2022-02-16

## 2022-02-16 NOTE — PROGRESS NOTES
Specialty Pharmacy Refill Coordination Note     Raquel is a 21 y.o. male contacted today regarding refills of his specialty medication(s).    Specialty medication(s) and dose(s) confirmed: Omnipod Dash  Changes to medications: no  Changes to insurance: no  Reviewed and verified with patient:  Allergies  Meds             Delivery Questions      Most Recent Value   Delivery method [employer]  [Ship 2/17, delivery 2/18]   Delivery address correct? Yes   Delivery phone number [phone]   Preferred delivery time? Anytime   Number of medications in delivery 1   Medication being filled and delivered Omnipod Dash   Doses left of specialty medications new   Is there any medication that is due not being filled? No   Supplies needed? No supplies needed   Cooler needed? No   Do any medications need mixed or dated? No   Copay form of payment Payment plan already set up   Additional comments No copay w/insurance   Questions or concerns for the pharmacist? No   Explain any questions or concerns for the pharmacist PharmD led call   Are any medications first time fills? Yes  [Omnipod Dash]               Follow-up: 1 month(s)     Yuliya Humphrey PharmD  2/16/2022  16:33 EST

## 2022-02-28 RX ORDER — PEN NEEDLE, DIABETIC 30 GX3/16"
1 NEEDLE, DISPOSABLE MISCELLANEOUS 4 TIMES DAILY PRN
Qty: 100 EACH | Refills: 0 | Status: SHIPPED | OUTPATIENT
Start: 2022-02-28 | End: 2022-03-31

## 2022-03-01 ENCOUNTER — TELEPHONE (OUTPATIENT)
Dept: ENDOCRINOLOGY | Age: 22
End: 2022-03-01

## 2022-03-03 ENCOUNTER — TELEPHONE (OUTPATIENT)
Dept: ENDOCRINOLOGY | Age: 22
End: 2022-03-03

## 2022-03-09 ENCOUNTER — SPECIALTY PHARMACY (OUTPATIENT)
Dept: ENDOCRINOLOGY | Age: 22
End: 2022-03-09

## 2022-03-09 NOTE — PROGRESS NOTES
Specialty Pharmacy Refill Coordination Note     Raquel is a 21 y.o. male contacted today regarding refills of  1 specialty medication(s).    Reviewed and verified with patient:         Specialty medication(s) and dose(s) confirmed: yes        Delivery Questions    Flowsheet Row Most Recent Value   Delivery method [employer]   Delivery address correct? Yes   Preferred delivery time? Anytime   Number of medications in delivery 1   Medication being filled and delivered omnipod dash   Doses left of specialty medications 1 week   Is there any medication that is due not being filled? No   Supplies needed? No supplies needed   Cooler needed? No   Do any medications need mixed or dated? No   Additional comments 0   Questions or concerns for the pharmacist? No   Explain any questions or concerns for the pharmacist n/a   Are any medications first time fills? No            Medication Adherence    Any gaps in refill history greater than 2 weeks in the last 3 months: no  Demonstrates understanding of importance of adherence: yes  Informant: patient  Reliability of informant: reliable  Provider-estimated medication adherence level: %  Reasons for non-adherence: no problems identified  Adherence tools used: alarm  Support network for adherence: family member, healthcare provider          Follow-up: 1 month(s)     KAUSHIK GLORIA  Specialty Pharmacy Technician

## 2022-03-17 ENCOUNTER — OFFICE VISIT (OUTPATIENT)
Dept: NEUROLOGY | Facility: CLINIC | Age: 22
End: 2022-03-17

## 2022-03-17 VITALS
WEIGHT: 149 LBS | SYSTOLIC BLOOD PRESSURE: 110 MMHG | DIASTOLIC BLOOD PRESSURE: 70 MMHG | HEART RATE: 60 BPM | OXYGEN SATURATION: 98 % | HEIGHT: 70 IN | BODY MASS INDEX: 21.33 KG/M2

## 2022-03-17 DIAGNOSIS — H47.20 OPTIC ATROPHY: Primary | ICD-10-CM

## 2022-03-17 PROCEDURE — 99213 OFFICE O/P EST LOW 20 MIN: CPT | Performed by: PSYCHIATRY & NEUROLOGY

## 2022-03-17 NOTE — PROGRESS NOTES
CC:Tremor/ tremulousness episodes    HPI:  Raquel Sparks is a  21 y.o.  right-handed   male with an unusual history of mild tremor. He was sent by Dr Albrecht for a neurologic consult. The tremor is in the hands noted more by his mother than him. It doesn't interfere with his activities. He has an additional involuntary movement that occurs very rarely perhaps once in a year for the last for 5 years where when he is driving his upper body including the torso and arms stiffens up and he becomes tremulous.  His neck seems involved.  His face or jaw is not involved nor are his legs.  He states this lasts only about 3 or 4 seconds.  There is no alteration of consciousness..  He indicates no association with blood sugars.    He has some background history of a significant head injury with skull fracture at age 1-1/1 years when a television set fell on his head.  He had brain swelling and was hospitalized about 5 days.  Within a few months of that it was noted that he was losing vision in the right eye and over about a 2-year.  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  He was followed by ophthalmology.  No issues with the left eye.  He has no issues with gait or bowel or bladder dysfunction.    No other head traumas or meningitis seizures stroke or syncope.  His family history is positive for shaking of the right leg and his mother.  This seems to occur with action.  Nothing in the left leg and nothing in the arms.  He states no history of thyroid disease and recent thyroid blood test were normal.  He is followed by Dr. Pike of endocrinology.  He has diabetes and is insulin requiring which was diagnosed relatively recently.  He indicates his blood sugars are in pretty good shape and he does not really have significant hypoglycemia.  He denies numbness or tingling or burning in the hands or feet.  He states some weight loss has occurred recently in  association with his diagnosis of diabetes.    MRI of the brain was obtained showing a cystic structure in the sella turcica and follow-up scans of the pituitary have demonstrated what appears to be an arachnoid cyst in the sella turcica moving the pituitary somewhat to the right.  I have looked at the films and agree.  Of interest also on his initial scan about a year ago he had some bright signal intensity on T2 and flair images in the periaqueductal gray of the midbrain.  This has progressively improved actually.  There is some vague T2/flair brightness diffusely in the white matter and a single white matter lesion in the left parieto-occipital area not close to the ventricle.        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   • Diabetes mellitus (HCC)          Past Surgical History:   Procedure Laterality Date   • CIRCUMCISION     • EYE SURGERY Right            Current Outpatient Medications:   •  atorvastatin (LIPITOR) 20 MG tablet, Take 1 tablet at bedtime, Disp: 90 tablet, Rfl: 2  •  NovoLOG 100 UNIT/ML injection, Use 200 units on insulin pump every 3 days., Disp: 20 mL, Rfl: 3  •  Continuous Blood Gluc  (Dexcom G6 ) device, 1 each Daily., Disp: 1 each, Rfl: 1  •  Continuous Blood Gluc Sensor (Dexcom G6 Sensor), Every 10 (Ten) Days., Disp: 9 each, Rfl: 3  •  Continuous Blood Gluc Transmit (Dexcom G6 Transmitter) misc, 1 each Every 3 (Three) Months., Disp: 1 each, Rfl: 3  •  Glucagon (Gvoke HypoPen 1-Pack) 1 MG/0.2ML solution auto-injector, Use as directed for emergently low blood glucose level., Disp: 0.2 mL, Rfl: 0  •  Insulin Disposable Pump (OmniPod Dash 5 Pack Pods) misc, Change Every 3 (Three) Days., Disp: 10 each, Rfl: 3  •  Insulin Glargine (Lantus SoloStar) 100 UNIT/ML injection pen, Inject 16 Units under the skin into the appropriate area as directed Every Night for 30 days., Disp: 10 pen, Rfl: 1  •  Insulin Pen Needle (Pen  Needles) 32G X 4 MM misc, Inject 1 each under the skin into the appropriate area as directed 4 (Four) Times a Day As Needed (for insulin injections). Formulary Compliance Approval, Disp: 100 each, Rfl: 0  •  Nutritional Supplements (Glucose Management) tablet, Use as needed for emergently low blood glucose levels. Formulary Compliance Approval, Disp: 30 tablet, Rfl: 0      Family History   Problem Relation Age of Onset   • Migraines Mother    • Hypertension Mother    • Hyperthyroidism Mother    • Diabetes Father    • Hypertension Father    • Hypertension Maternal Grandmother    • Hyperlipidemia Maternal Grandmother    • Diabetes Paternal Grandmother          Social History     Socioeconomic History   • Marital status: Single   Tobacco Use   • Smoking status: Never Smoker   • Smokeless tobacco: Never Used   Vaping Use   • Vaping Use: Never used   Substance and Sexual Activity   • Alcohol use: No   • Drug use: Not Currently     Types: Marijuana   • Sexual activity: Yes         Allergies   Allergen Reactions   • Strawberry Angioedema   • Strawberry (Diagnostic) Other (See Comments)   • Strawberry Extract Hives         Pain Scale: 0/10        ROS:  Review of Systems   Constitutional: Negative for activity change, appetite change and fatigue.   Eyes: Negative for pain, redness and itching.   Respiratory: Negative for cough, choking and shortness of breath.    Cardiovascular: Positive for chest pain. Negative for leg swelling.   Gastrointestinal: Negative for abdominal pain, nausea and vomiting.   Endocrine: Negative for cold intolerance and heat intolerance.   Musculoskeletal: Negative for arthralgias, back pain and joint swelling.   Allergic/Immunologic: Negative for environmental allergies and food allergies.   Neurological: Positive for tremors. Negative for dizziness, seizures, syncope, facial asymmetry, speech difficulty, weakness, light-headedness, numbness and headaches.   Psychiatric/Behavioral: Positive for  "confusion. Negative for agitation, behavioral problems, decreased concentration, dysphoric mood, hallucinations, self-injury, sleep disturbance and suicidal ideas. The patient is not nervous/anxious and is not hyperactive.          I have reviewed and agree with the above ROS completed by the medical assistant.      Physical Exam:  Vitals:    03/17/22 0811   BP: 110/70   Pulse: 60   SpO2: 98%   Weight: 67.6 kg (149 lb)   Height: 177.8 cm (70\")     Orthostatic BP:    Body mass index is 21.38 kg/m².    Physical Exam  General: Well-developed athletic appearing -American male no acute distress  HEENT: Normocephalic no evidence of trauma.  The right disc is pale.  The left disc is of normal color.  Throat negative  Neck: Supple.  No Lhermitte sign.  No thyromegaly.  No cervical bruits.  Heart: Regular rate and rhythm no murmurs.  No pedal edema.  Extremities: Radial pulses were strong and simultaneous.      Neurological Exam:   Mental Status: Awake, alert, oriented to person, place and time.  Conversant without evidence of an affective disorder, thought disorder, delusions or hallucinations.  Attention span and concentration are normal.  HCF: No aphasia, apraxia or dysarthria.  Recent and remote memory intact.  Knowledge of recent events intact.  CN: I:   II: Visual fields full without left inattention in the left eye.  Blind in the right eye.   III, IV, VI: Eye movements intact without nystagmus or ptosis.  Pupils demonstrate a Yonathan Ruben pupil on the right.   V,VII: Light touch and pinprick intact all 3 divisions of V.  Facial muscles symmetrical.   VIII: Hearing intact to finger rub   IX,X: Soft palate elevates symmetrically   XI: Sternomastoid and trapezius are strong.   XII: Tongue midline without atrophy or fasciculations  Motor: Normal tone and bulk in the upper and lower extremities   Power testing: Full power in all muscles tested arms and legs  Reflexes: Upper extremities: +1 diffusely        Lower " extremities: +1 diffusely        Toe signs: Downgoing bilaterally  Sensory: Light touch: Diffusely intact arms and leg        Pinprick: Diffusely intact arms and legs        Vibration: Intact at the ankle        Position: Holds intact at the great toes    Cerebellar: Finger-to-nose: Normal           Rapid movement: Normal           Heel-to-shin: Normal  Gait and Station: Normal casual, toe, heel and tandem walk.  No Romberg no drift    Results:      Lab Results   Component Value Date    GLUCOSE 223 (H) 11/29/2021    BUN 14 11/29/2021    CREATININE 1.11 11/29/2021    EGFRIFNONA 93 11/29/2021    EGFRIFAFRI 108 11/29/2021    BCR 13 11/29/2021    CO2 24 11/29/2021    CALCIUM 9.5 11/29/2021    PROTENTOTREF 7.1 11/29/2021    ALBUMIN 4.3 11/29/2021    LABIL2 1.5 11/29/2021    AST 34 11/29/2021    ALT 64 (H) 11/29/2021       Lab Results   Component Value Date    WBC 4.7 11/22/2021    HGB 13.8 11/22/2021    HCT 41.0 11/22/2021    MCV 90 11/22/2021     11/22/2021         .No results found for: RPR      Lab Results   Component Value Date    TSH 0.966 11/11/2021         No results found for: FOMMEFET80      No results found for: FOLATE      Lab Results   Component Value Date    HGBA1C 13.80 (H) 11/11/2021         Lab Results   Component Value Date    GLUCOSE 223 (H) 11/29/2021    BUN 14 11/29/2021    CREATININE 1.11 11/29/2021    EGFRIFNONA 93 11/29/2021    EGFRIFAFRI 108 11/29/2021    BCR 13 11/29/2021    K 4.3 11/29/2021    CO2 24 11/29/2021    CALCIUM 9.5 11/29/2021    PROTENTOTREF 7.1 11/29/2021    ALBUMIN 4.3 11/29/2021    LABIL2 1.5 11/29/2021    AST 34 11/29/2021    ALT 64 (H) 11/29/2021         Lab Results   Component Value Date    WBC 4.7 11/22/2021    HGB 13.8 11/22/2021    HCT 41.0 11/22/2021    MCV 90 11/22/2021     11/22/2021       MRI brain films reviewed as noted above      Assessment:   1.  Tremor, mild m and intermittent, ost likely essential tremor however it is intermittent and is not  interfering with his life he states.  Mother has a tremor in the right leg with use also intermittent.  Origin is not entirely certain.  2.  Rare episodes of tensing up of muscles of the torso and arms and neck for very brief period of a few seconds-origin not entirely clear.  Patient remains awake without alteration of mental status.  Highly unlikely they are epileptic.  3.  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  The origin of this is not clear to me.  The hyperintensity in the periaqueductal gray seen on a previous MRI is of interest since neuromyelitis optica can be seen with this finding associated with optic atrophy.          Plan:  1.  We discussed medications for tremor although currently he does not have enough symptoms to warrant being treated.  2.  NMO antibodies and MOG antibodies.  Call office for results.  3.  Follow-up as needed      Time: 45 minutes                    Dictated utilizing Dragon dictation.

## 2022-03-21 ENCOUNTER — PATIENT ROUNDING (BHMG ONLY) (OUTPATIENT)
Dept: NEUROLOGY | Facility: CLINIC | Age: 22
End: 2022-03-21

## 2022-03-23 ENCOUNTER — LAB (OUTPATIENT)
Dept: LAB | Facility: HOSPITAL | Age: 22
End: 2022-03-23

## 2022-03-23 DIAGNOSIS — H47.20 OPTIC ATROPHY: ICD-10-CM

## 2022-03-23 LAB
BASOPHILS # BLD AUTO: 0.02 10*3/MM3 (ref 0–0.2)
BASOPHILS NFR BLD AUTO: 0.5 % (ref 0–1.5)
BILIRUB UR QL STRIP: NEGATIVE
CLARITY UR: CLEAR
COLOR UR: YELLOW
DEPRECATED RDW RBC AUTO: 41.7 FL (ref 37–54)
EOSINOPHIL # BLD AUTO: 0.18 10*3/MM3 (ref 0–0.4)
EOSINOPHIL NFR BLD AUTO: 4.1 % (ref 0.3–6.2)
ERYTHROCYTE [DISTWIDTH] IN BLOOD BY AUTOMATED COUNT: 13 % (ref 12.3–15.4)
GLUCOSE UR STRIP-MCNC: ABNORMAL MG/DL
HCT VFR BLD AUTO: 45.1 % (ref 37.5–51)
HGB BLD-MCNC: 15 G/DL (ref 13–17.7)
HGB UR QL STRIP.AUTO: NEGATIVE
IMM GRANULOCYTES # BLD AUTO: 0 10*3/MM3 (ref 0–0.05)
IMM GRANULOCYTES NFR BLD AUTO: 0 % (ref 0–0.5)
KETONES UR QL STRIP: ABNORMAL
LEUKOCYTE ESTERASE UR QL STRIP.AUTO: NEGATIVE
LIPASE SERPL-CCNC: 30 U/L (ref 13–60)
LYMPHOCYTES # BLD AUTO: 1.99 10*3/MM3 (ref 0.7–3.1)
LYMPHOCYTES NFR BLD AUTO: 45.2 % (ref 19.6–45.3)
MCH RBC QN AUTO: 29.8 PG (ref 26.6–33)
MCHC RBC AUTO-ENTMCNC: 33.3 G/DL (ref 31.5–35.7)
MCV RBC AUTO: 89.7 FL (ref 79–97)
MONOCYTES # BLD AUTO: 0.36 10*3/MM3 (ref 0.1–0.9)
MONOCYTES NFR BLD AUTO: 8.2 % (ref 5–12)
NEUTROPHILS NFR BLD AUTO: 1.85 10*3/MM3 (ref 1.7–7)
NEUTROPHILS NFR BLD AUTO: 42 % (ref 42.7–76)
NITRITE UR QL STRIP: NEGATIVE
NRBC BLD AUTO-RTO: 0 /100 WBC (ref 0–0.2)
PH UR STRIP.AUTO: 7.5 [PH] (ref 5–8)
PLATELET # BLD AUTO: 216 10*3/MM3 (ref 140–450)
PMV BLD AUTO: 11 FL (ref 6–12)
PROT UR QL STRIP: NEGATIVE
RBC # BLD AUTO: 5.03 10*6/MM3 (ref 4.14–5.8)
SP GR UR STRIP: 1.03 (ref 1–1.03)
UROBILINOGEN UR QL STRIP: ABNORMAL
WBC NRBC COR # BLD: 4.4 10*3/MM3 (ref 3.4–10.8)

## 2022-03-23 PROCEDURE — 86051 AQUAPORIN-4 ANTB ELISA: CPT

## 2022-03-23 PROCEDURE — 36415 COLL VENOUS BLD VENIPUNCTURE: CPT

## 2022-03-23 PROCEDURE — 81003 URINALYSIS AUTO W/O SCOPE: CPT

## 2022-03-23 PROCEDURE — 96360 HYDRATION IV INFUSION INIT: CPT

## 2022-03-23 PROCEDURE — 80053 COMPREHEN METABOLIC PANEL: CPT

## 2022-03-23 PROCEDURE — 83690 ASSAY OF LIPASE: CPT

## 2022-03-23 PROCEDURE — 86362 MOG-IGG1 ANTB CBA EACH: CPT

## 2022-03-23 PROCEDURE — 85025 COMPLETE CBC W/AUTO DIFF WBC: CPT

## 2022-03-23 PROCEDURE — 99283 EMERGENCY DEPT VISIT LOW MDM: CPT

## 2022-03-23 RX ORDER — SODIUM CHLORIDE 0.9 % (FLUSH) 0.9 %
10 SYRINGE (ML) INJECTION AS NEEDED
Status: DISCONTINUED | OUTPATIENT
Start: 2022-03-23 | End: 2022-03-24 | Stop reason: HOSPADM

## 2022-03-24 ENCOUNTER — HOSPITAL ENCOUNTER (EMERGENCY)
Facility: HOSPITAL | Age: 22
Discharge: HOME | End: 2022-03-24
Attending: EMERGENCY MEDICINE | Admitting: EMERGENCY MEDICINE

## 2022-03-24 VITALS
RESPIRATION RATE: 18 BRPM | WEIGHT: 149 LBS | HEIGHT: 71 IN | BODY MASS INDEX: 20.86 KG/M2 | TEMPERATURE: 98.7 F | SYSTOLIC BLOOD PRESSURE: 123 MMHG | DIASTOLIC BLOOD PRESSURE: 79 MMHG | HEART RATE: 89 BPM | OXYGEN SATURATION: 100 %

## 2022-03-24 DIAGNOSIS — E10.65 TYPE 1 DIABETES MELLITUS WITH HYPERGLYCEMIA: ICD-10-CM

## 2022-03-24 DIAGNOSIS — K62.5 RECTAL BLEEDING: Primary | ICD-10-CM

## 2022-03-24 LAB
ALBUMIN SERPL-MCNC: 4.5 G/DL (ref 3.5–5.2)
ALBUMIN/GLOB SERPL: 1.6 G/DL
ALP SERPL-CCNC: 110 U/L (ref 39–117)
ALT SERPL W P-5'-P-CCNC: 33 U/L (ref 1–41)
ANION GAP SERPL CALCULATED.3IONS-SCNC: 10 MMOL/L (ref 5–15)
AST SERPL-CCNC: 29 U/L (ref 1–40)
BILIRUB SERPL-MCNC: 0.2 MG/DL (ref 0–1.2)
BUN SERPL-MCNC: 16 MG/DL (ref 6–20)
BUN/CREAT SERPL: 12.7 (ref 7–25)
CALCIUM SPEC-SCNC: 9.3 MG/DL (ref 8.6–10.5)
CHLORIDE SERPL-SCNC: 93 MMOL/L (ref 98–107)
CO2 SERPL-SCNC: 24 MMOL/L (ref 22–29)
CREAT SERPL-MCNC: 1.26 MG/DL (ref 0.76–1.27)
EGFRCR SERPLBLD CKD-EPI 2021: 82.2 ML/MIN/1.73
GLOBULIN UR ELPH-MCNC: 2.9 GM/DL
GLUCOSE BLDC GLUCOMTR-MCNC: 261 MG/DL (ref 70–130)
GLUCOSE SERPL-MCNC: 668 MG/DL (ref 65–99)
HOLD SPECIMEN: NORMAL
HOLD SPECIMEN: NORMAL
POTASSIUM SERPL-SCNC: 4.8 MMOL/L (ref 3.5–5.2)
PROT SERPL-MCNC: 7.4 G/DL (ref 6–8.5)
SODIUM SERPL-SCNC: 127 MMOL/L (ref 136–145)
WHOLE BLOOD HOLD SPECIMEN: NORMAL
WHOLE BLOOD HOLD SPECIMEN: NORMAL

## 2022-03-24 PROCEDURE — 82962 GLUCOSE BLOOD TEST: CPT

## 2022-03-24 PROCEDURE — 63710000001 INSULIN REGULAR HUMAN PER 5 UNITS: Performed by: PHYSICIAN ASSISTANT

## 2022-03-24 RX ADMIN — SODIUM CHLORIDE 2000 ML: 9 INJECTION, SOLUTION INTRAVENOUS at 01:07

## 2022-03-24 RX ADMIN — INSULIN HUMAN 10 UNITS: 100 INJECTION, SOLUTION PARENTERAL at 01:09

## 2022-03-24 NOTE — ED PROVIDER NOTES
MD ATTESTATION NOTE    The AMANDA and I have discussed this patient's history, physical exam, and treatment plan.    I provided a substantive portion of the care of this patient. I personally performed the physical exam, in its entirety. The attached note describes my personal findings.      Raquel Sparks II is a 21 y.o. male who presents to the ED c/o of blood on paper when he wiped.  States that the toilet water was normal in color there was no bloody feces in the toilet.  Denies any pain.  Has no complaints.      On exam:  GENERAL: not distressed  HENT: nares patent  EYES: no scleral icterus  CV: regular rhythm, regular rate  RESPIRATORY: normal effort  ABDOMEN: soft  MUSCULOSKELETAL: no deformity  NEURO: alert, moves all extremities, follows commands  SKIN: warm, dry    Labs  Recent Results (from the past 24 hour(s))   Comprehensive Metabolic Panel    Collection Time: 03/23/22 11:30 PM    Specimen: Arm, Left; Blood   Result Value Ref Range    Glucose 668 (C) 65 - 99 mg/dL    BUN 16 6 - 20 mg/dL    Creatinine 1.26 0.76 - 1.27 mg/dL    Sodium 127 (L) 136 - 145 mmol/L    Potassium 4.8 3.5 - 5.2 mmol/L    Chloride 93 (L) 98 - 107 mmol/L    CO2 24.0 22.0 - 29.0 mmol/L    Calcium 9.3 8.6 - 10.5 mg/dL    Total Protein 7.4 6.0 - 8.5 g/dL    Albumin 4.50 3.50 - 5.20 g/dL    ALT (SGPT) 33 1 - 41 U/L    AST (SGOT) 29 1 - 40 U/L    Alkaline Phosphatase 110 39 - 117 U/L    Total Bilirubin 0.2 0.0 - 1.2 mg/dL    Globulin 2.9 gm/dL    A/G Ratio 1.6 g/dL    BUN/Creatinine Ratio 12.7 7.0 - 25.0    Anion Gap 10.0 5.0 - 15.0 mmol/L    eGFR 82.2 >60.0 mL/min/1.73   Lipase    Collection Time: 03/23/22 11:30 PM    Specimen: Arm, Left; Blood   Result Value Ref Range    Lipase 30 13 - 60 U/L   Green Top (Gel)    Collection Time: 03/23/22 11:30 PM   Result Value Ref Range    Extra Tube Hold for add-ons.    Lavender Top    Collection Time: 03/23/22 11:30 PM   Result Value Ref Range    Extra Tube hold for add-on    Gold Top - SST     Collection Time: 03/23/22 11:30 PM   Result Value Ref Range    Extra Tube Hold for add-ons.    Light Blue Top    Collection Time: 03/23/22 11:30 PM   Result Value Ref Range    Extra Tube hold for add-on    CBC Auto Differential    Collection Time: 03/23/22 11:30 PM    Specimen: Arm, Left; Blood   Result Value Ref Range    WBC 4.40 3.40 - 10.80 10*3/mm3    RBC 5.03 4.14 - 5.80 10*6/mm3    Hemoglobin 15.0 13.0 - 17.7 g/dL    Hematocrit 45.1 37.5 - 51.0 %    MCV 89.7 79.0 - 97.0 fL    MCH 29.8 26.6 - 33.0 pg    MCHC 33.3 31.5 - 35.7 g/dL    RDW 13.0 12.3 - 15.4 %    RDW-SD 41.7 37.0 - 54.0 fl    MPV 11.0 6.0 - 12.0 fL    Platelets 216 140 - 450 10*3/mm3    Neutrophil % 42.0 (L) 42.7 - 76.0 %    Lymphocyte % 45.2 19.6 - 45.3 %    Monocyte % 8.2 5.0 - 12.0 %    Eosinophil % 4.1 0.3 - 6.2 %    Basophil % 0.5 0.0 - 1.5 %    Immature Grans % 0.0 0.0 - 0.5 %    Neutrophils, Absolute 1.85 1.70 - 7.00 10*3/mm3    Lymphocytes, Absolute 1.99 0.70 - 3.10 10*3/mm3    Monocytes, Absolute 0.36 0.10 - 0.90 10*3/mm3    Eosinophils, Absolute 0.18 0.00 - 0.40 10*3/mm3    Basophils, Absolute 0.02 0.00 - 0.20 10*3/mm3    Immature Grans, Absolute 0.00 0.00 - 0.05 10*3/mm3    nRBC 0.0 0.0 - 0.2 /100 WBC   Urinalysis With Microscopic If Indicated (No Culture) - Urine, Clean Catch    Collection Time: 03/23/22 11:31 PM    Specimen: Urine, Clean Catch   Result Value Ref Range    Color, UA Yellow Yellow, Straw    Appearance, UA Clear Clear    pH, UA 7.5 5.0 - 8.0    Specific Gravity, UA 1.029 1.005 - 1.030    Glucose, UA >=1000 mg/dL (3+) (A) Negative    Ketones, UA Trace (A) Negative    Bilirubin, UA Negative Negative    Blood, UA Negative Negative    Protein, UA Negative Negative    Leuk Esterase, UA Negative Negative    Nitrite, UA Negative Negative    Urobilinogen, UA 0.2 E.U./dL 0.2 - 1.0 E.U./dL   POC Glucose Once    Collection Time: 03/24/22  2:21 AM    Specimen: Blood   Result Value Ref Range    Glucose 261 (H) 70 - 130 mg/dL        Radiology  No Radiology Exams Resulted Within Past 24 Hours    Medical Decision Making:  ED Course as of 03/24/22 0654   Thu Mar 24, 2022   0043 WBC: 4.40 [NICHOLAS]   0043 Hemoglobin: 15.0 [NICHOLAS]   0043 Hematocrit: 45.1 [NICHOLAS]   0043 Platelets: 216 [NICHOLAS]   0043 Glucose(!!): 668 [NICHOLAS]   0043 BUN: 16 [NICHOLAS]   0043 Creatinine: 1.26 [NICHOLAS]   0043 Sodium(!): 127 [NICHOLAS]   0043 Potassium: 4.8 [NICHOLAS]   0043 Chloride(!): 93 [NICHOLAS]   0043 CO2: 24.0 [NICHOLAS]   0043 Calcium: 9.3 [NICHOLAS]   0043 ALT (SGPT): 33 [NICHOLAS]   0043 AST (SGOT): 29 [NICHOLAS]   0043 Total Bilirubin: 0.2 [NICHOLAS]   0043 Lipase: 30 [NICHOLAS]   0043 Glucose(!): >=1000 mg/dL (3+) [NICHOLAS]   0043 Ketones, UA(!): Trace [NICHOLAS]   0056 Anion Gap: 10.0 [NICHOLAS]   0255 Patient rechecked, resting comfortably bed, glucose is down to 261, and patient has no complaints. [NICHOLAS]   0311 Glucose(!): 261 [NICHOLAS]      ED Course User Index  [NICHOLAS] Peter Renee PA           PPE: Both the patient and I wore a surgical mask throughout the entire patient encounter. I wore protective goggles.     Diagnosis  Final diagnoses:   Rectal bleeding   Type 1 diabetes mellitus with hyperglycemia (HCC)        Bandar Taylor MD  03/24/22 0311       Bandar Taylor MD  03/24/22 0655

## 2022-03-24 NOTE — DISCHARGE INSTRUCTIONS
Home, rest, home medicine as prescribed, keep close control over your sugars.  Follow up with PCP for recheck and further evaluation with diabetic educator. Return to care with further concerns.

## 2022-03-24 NOTE — ED TRIAGE NOTES
Patient from home via private vehicle reporting rectal bleeding that started today. States he had a bowel movement, and noticed bright red blood when we wiped. Denies any other symptoms.
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
## 2022-03-25 ENCOUNTER — TELEPHONE (OUTPATIENT)
Dept: ENDOCRINOLOGY | Age: 22
End: 2022-03-25

## 2022-03-25 LAB
AQP4 H2O CHANNEL IGG SERPL IA-ACNC: <1.5 U/ML (ref 0–3)
MOG AB SER QL CBA IFA: NEGATIVE

## 2022-03-28 ENCOUNTER — OFFICE VISIT (OUTPATIENT)
Dept: ENDOCRINOLOGY | Age: 22
End: 2022-03-28

## 2022-03-28 VITALS
OXYGEN SATURATION: 98 % | HEIGHT: 71 IN | SYSTOLIC BLOOD PRESSURE: 118 MMHG | BODY MASS INDEX: 21.25 KG/M2 | WEIGHT: 151.8 LBS | HEART RATE: 90 BPM | DIASTOLIC BLOOD PRESSURE: 80 MMHG

## 2022-03-28 DIAGNOSIS — E53.8 VITAMIN B 12 DEFICIENCY: ICD-10-CM

## 2022-03-28 DIAGNOSIS — R76.8 POSITIVE GAD ANTIBODY: ICD-10-CM

## 2022-03-28 DIAGNOSIS — E78.49 OTHER HYPERLIPIDEMIA: ICD-10-CM

## 2022-03-28 DIAGNOSIS — E10.65 TYPE 1 DIABETES MELLITUS WITH HYPERGLYCEMIA: Primary | ICD-10-CM

## 2022-03-28 PROCEDURE — 99214 OFFICE O/P EST MOD 30 MIN: CPT | Performed by: NURSE PRACTITIONER

## 2022-03-28 NOTE — PROGRESS NOTES
Chief Complaint  Chief Complaint   Patient presents with   • Diabetes     Type 1       Mom is with patient at visit.    Subjective          History of Present Illness    Raquel Sparks II 21 y.o. presents for a follow-up evaluation for type 1 DM     He has been diabetic since October 2021 and started insulin in November 2021     Pt is on the following medications for their DM: Novolog via Omnipod     Omnipod settings     Basal rate: 0.5 untis per hour     Carb ratio: 1 unit for every 10 gram of carbs     Target: 150     Active insulin time: 3 hours       Denies diarrhea, constipation, difficulty breathing,chest pain, shortness of breath, vision changes, numbness and tingling in feet/hands.     Pt does not have a history of DM retinopathy.  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     Pt does not have a history of nephropathy.  Patient is not currently taking ACE/ARB     Pt does not have neuropathy.     Pt does not have a history of CAD or CVA.     Last A1C in 11/21 was 13.80     Last microalbumin in 11/21 was negative              Blood Sugars     Blood glucoses are checked via dexcom but didn't have one on in a while.  Last readings are available for 03/9/22 - 03/22/22     Fasting blood glucoses: 300-400s     Pre-meal blood glucoses: 300-400s     Pt has no episodes of hypoglycemia.           Pt was in the ER 03/24/22 for rectal bleeding, but his BG was 668.  The gave him 2 Liters of saline and 10 units of IV insulin which brought it down to 261.           Hyperlipidemia      Pt denies any muscle/body aches, chest pain, or shortness of breath     Pt is currently taking atorvastatin 20 mg HS - not taking at this time     Last lipid panel in 11/21 showed Total 197, Triglycerides 101, HDL 65 and                     Vitamin B12 Deficiency     Current treatment is dietary modification              Pt is seeing Dr. Ford for pituitary adenoma, incidental  "finding on work up for shaking.  MRI of the pituitary done in November 2021 showed a localized widening of the left side of the sella turcica containing his CSF consistent with small arachnoid cyst or partially empty sella. Pt is following with Neurology for shaking.             I have reviewed the patient's allergies, medicines, past medical hx, family hx and social hx.    Objective   Vital Signs:   /80   Pulse 90   Ht 180.3 cm (70.98\")   Wt 68.9 kg (151 lb 12.8 oz)   SpO2 98%   BMI 21.18 kg/m²       Physical Exam   Physical Exam  Constitutional:       General: He is not in acute distress.     Appearance: Normal appearance. He is not diaphoretic.   HENT:      Head: Normocephalic and atraumatic.   Eyes:      General:         Right eye: No discharge.         Left eye: No discharge.   Skin:     General: Skin is warm and dry.   Neurological:      Mental Status: He is alert.   Psychiatric:         Mood and Affect: Mood normal.         Behavior: Behavior normal.                    Results Review:   Hemoglobin A1C   Date Value Ref Range Status   11/11/2021 13.80 (H) 4.80 - 5.60 % Final     Triglycerides   Date Value Ref Range Status   11/22/2021 101 0 - 149 mg/dL Final     HDL Cholesterol   Date Value Ref Range Status   11/22/2021 65 >39 mg/dL Final     LDL Chol Calc (NIH)   Date Value Ref Range Status   11/22/2021 114 (H) 0 - 99 mg/dL Final     VLDL Cholesterol Jeffery   Date Value Ref Range Status   11/22/2021 18 5 - 40 mg/dL Final         Assessment and Plan {CC Problem List  Visit Diagnosis  ROS  Review (Popup)  Health Maintenance  Quality  BestPractice  Medications  SmartSets  SnapShot Encounters  Media :23  Diagnoses and all orders for this visit:    1. Type 1 diabetes mellitus with hyperglycemia (HCC) (Primary)  -     NovoLOG 100 UNIT/ML injection; 50 units daily via insulin pump  Dispense: 20 mL; Refill: 3  -     Hemoglobin A1c; Future  -     Comprehensive Metabolic Panel; Future    Continue " with Novolog via Omnipod  Pt was not bolusing but once or twice a day if at all.  Talked about the importance of bolusing when he is eating to help bring down BG.  Showed him on the Omnipod read out of how when he does bolus it does bring down his BG.  Increase basal to 6.5 units per hour  Reviewed how to bolus with patient and reviewed how to do a correction bolus for high BGS.  Continue with Dexcom  Keep Lantus as back up      2. Positive MARLENI antibody  -     Hemoglobin A1c; Future    3. Other hyperlipidemia  -     Comprehensive Metabolic Panel; Future  -     Lipid Panel; Future    Not taking statin  Check labs prior to next visit       4. Vitamin B 12 deficiency  -     Vitamin B12; Future    Check labs prior to next visit         Refills sent to pharmacy      RTC in 1 months  with me, labs prior      Follow Up     Patient was given instructions and counseling regarding her condition or for health maintenance advice. Please see specific information pulled into the AVS if appropriate.              Shelbi Lechuga, APRN  03/28/22

## 2022-03-31 RX ORDER — PEN NEEDLE, DIABETIC 32GX 5/32"
NEEDLE, DISPOSABLE MISCELLANEOUS
Qty: 400 EACH | Refills: 3 | Status: SHIPPED | OUTPATIENT
Start: 2022-03-31

## 2022-04-04 ENCOUNTER — TELEPHONE (OUTPATIENT)
Dept: ENDOCRINOLOGY | Age: 22
End: 2022-04-04

## 2022-04-04 NOTE — TELEPHONE ENCOUNTER
Recd fax from [facility] patient needs refill on Insulin Lispro may need to do a prior auth or another med that would be covered under insurance.

## 2022-04-05 ENCOUNTER — SPECIALTY PHARMACY (OUTPATIENT)
Dept: ENDOCRINOLOGY | Age: 22
End: 2022-04-05

## 2022-04-05 NOTE — PROGRESS NOTES
Specialty Pharmacy Refill Coordination Note     Raquel is a 21 y.o. male contacted today regarding refills of  1 specialty medication(s).    Reviewed and verified with patient:         Specialty medication(s) and dose(s) confirmed: yes    Refill Questions    Flowsheet Row Most Recent Value   Changes to allergies? No   Changes to medications? No   New conditions since last clinic visit No   Unplanned office visit, urgent care, ED, or hospital admission in the last 4 weeks  No   How does patient/caregiver feel medication is working? Good   Financial problems or insurance changes  No   If yes, describe changes in insurance or financial issues. n/a   How many doses of your specialty medications were missed in the last 4 weeks? 2   Why were doses missed? patient wasn't changing every 3 days due to it being painful, but now he is changing it every 3 days but he said he had run out early before they were due.   Does this patient require a clinical escalation to a pharmacist? Yes          Delivery Questions    Flowsheet Row Most Recent Value   Delivery method [employer]   Delivery address correct? Yes   Preferred delivery time? Anytime   Number of medications in delivery 1   Medication being filled and delivered omnipod dash   Doses left of specialty medications 0   Is there any medication that is due not being filled? No   Supplies needed? No supplies needed   Cooler needed? No   Do any medications need mixed or dated? No   Additional comments 0   Questions or concerns for the pharmacist? No   Explain any questions or concerns for the pharmacist n/a   Are any medications first time fills? No            Medication Adherence    Adherence tools used: alarm  Support network for adherence: family member, healthcare provider          Follow-up: 1 month(s)     KAUSHIK GLORIA  Specialty Pharmacy Technician

## 2022-04-06 RX ORDER — INSULIN LISPRO-AABC 100 [IU]/ML
50 INJECTION, SOLUTION INTRAVENOUS; SUBCUTANEOUS DAILY
Qty: 20 ML | Refills: 3 | Status: SHIPPED | OUTPATIENT
Start: 2022-04-06 | End: 2022-04-13 | Stop reason: ALTCHOICE

## 2022-04-06 RX ORDER — INSULIN LISPRO-AABC 100 [IU]/ML
INJECTION, SOLUTION SUBCUTANEOUS
Qty: 20 ML | Refills: 3 | Status: CANCELLED | OUTPATIENT
Start: 2022-04-06

## 2022-04-08 ENCOUNTER — OFFICE VISIT (OUTPATIENT)
Dept: FAMILY MEDICINE CLINIC | Facility: CLINIC | Age: 22
End: 2022-04-08

## 2022-04-08 VITALS
HEIGHT: 70 IN | WEIGHT: 148 LBS | SYSTOLIC BLOOD PRESSURE: 110 MMHG | DIASTOLIC BLOOD PRESSURE: 80 MMHG | RESPIRATION RATE: 16 BRPM | BODY MASS INDEX: 21.19 KG/M2

## 2022-04-08 DIAGNOSIS — K62.5 RECTAL BLEEDING: Primary | ICD-10-CM

## 2022-04-08 PROCEDURE — 99214 OFFICE O/P EST MOD 30 MIN: CPT | Performed by: INTERNAL MEDICINE

## 2022-04-12 ENCOUNTER — TELEPHONE (OUTPATIENT)
Dept: ENDOCRINOLOGY | Age: 22
End: 2022-04-12
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
## 2022-04-13 ENCOUNTER — TELEPHONE (OUTPATIENT)
Dept: ENDOCRINOLOGY | Age: 22
End: 2022-04-13

## 2022-04-20 ENCOUNTER — HOSPITAL ENCOUNTER (EMERGENCY)
Facility: HOSPITAL | Age: 22
Discharge: LEFT BEFORE DISCHARGE | End: 2022-04-20

## 2022-04-20 VITALS
HEIGHT: 72 IN | OXYGEN SATURATION: 95 % | DIASTOLIC BLOOD PRESSURE: 92 MMHG | SYSTOLIC BLOOD PRESSURE: 105 MMHG | BODY MASS INDEX: 19.77 KG/M2 | RESPIRATION RATE: 20 BRPM | TEMPERATURE: 98.3 F | WEIGHT: 146 LBS | HEART RATE: 109 BPM

## 2022-04-20 PROCEDURE — 99211 OFF/OP EST MAY X REQ PHY/QHP: CPT

## 2022-04-20 NOTE — ED TRIAGE NOTES
"Pt arrives to ED via PV from home with c/o complications d/t diabetes.  Pt was at work last night and felt fatigued, BG reading just says \"HI.\"  Pt denies any hx of DKA, but states he was \"close\" before.    "

## 2022-04-21 ENCOUNTER — LAB (OUTPATIENT)
Dept: ENDOCRINOLOGY | Age: 22
End: 2022-04-21

## 2022-04-21 DIAGNOSIS — E78.49 OTHER HYPERLIPIDEMIA: ICD-10-CM

## 2022-04-21 DIAGNOSIS — R76.8 POSITIVE GAD ANTIBODY: ICD-10-CM

## 2022-04-21 DIAGNOSIS — E53.8 VITAMIN B 12 DEFICIENCY: ICD-10-CM

## 2022-04-21 DIAGNOSIS — E10.65 TYPE 1 DIABETES MELLITUS WITH HYPERGLYCEMIA: ICD-10-CM

## 2022-04-22 ENCOUNTER — TELEPHONE (OUTPATIENT)
Dept: ENDOCRINOLOGY | Age: 22
End: 2022-04-22

## 2022-04-22 ENCOUNTER — HOSPITAL ENCOUNTER (EMERGENCY)
Facility: HOSPITAL | Age: 22
Discharge: HOME | End: 2022-04-22
Attending: EMERGENCY MEDICINE | Admitting: EMERGENCY MEDICINE

## 2022-04-22 ENCOUNTER — SPECIALTY PHARMACY (OUTPATIENT)
Dept: ENDOCRINOLOGY | Age: 22
End: 2022-04-22

## 2022-04-22 VITALS
RESPIRATION RATE: 16 BRPM | DIASTOLIC BLOOD PRESSURE: 80 MMHG | TEMPERATURE: 97.2 F | SYSTOLIC BLOOD PRESSURE: 117 MMHG | HEART RATE: 52 BPM | OXYGEN SATURATION: 99 %

## 2022-04-22 DIAGNOSIS — R76.8 POSITIVE GAD ANTIBODY: ICD-10-CM

## 2022-04-22 DIAGNOSIS — R73.9 HYPERGLYCEMIA: Primary | ICD-10-CM

## 2022-04-22 DIAGNOSIS — E10.65 TYPE 1 DIABETES MELLITUS WITH HYPERGLYCEMIA: Primary | ICD-10-CM

## 2022-04-22 LAB
ALBUMIN SERPL-MCNC: 4.7 G/DL (ref 3.5–5.2)
ALBUMIN SERPL-MCNC: 4.8 G/DL (ref 4.1–5.2)
ALBUMIN/GLOB SERPL: 1.5 G/DL
ALBUMIN/GLOB SERPL: 1.5 {RATIO} (ref 1.2–2.2)
ALP SERPL-CCNC: 101 U/L (ref 39–117)
ALP SERPL-CCNC: 117 IU/L (ref 44–121)
ALT SERPL W P-5'-P-CCNC: 20 U/L (ref 1–41)
ALT SERPL-CCNC: 19 IU/L (ref 0–44)
ANION GAP SERPL CALCULATED.3IONS-SCNC: 13.2 MMOL/L (ref 5–15)
AST SERPL-CCNC: 17 IU/L (ref 0–40)
AST SERPL-CCNC: 18 U/L (ref 1–40)
B-OH-BUTYR SERPL-SCNC: 1.34 MMOL/L (ref 0.02–0.27)
BACTERIA UR QL AUTO: NORMAL /HPF
BASOPHILS # BLD AUTO: 0.04 10*3/MM3 (ref 0–0.2)
BASOPHILS NFR BLD AUTO: 0.7 % (ref 0–1.5)
BILIRUB SERPL-MCNC: 0.3 MG/DL (ref 0–1.2)
BILIRUB SERPL-MCNC: 0.4 MG/DL (ref 0–1.2)
BILIRUB UR QL STRIP: NEGATIVE
BUN SERPL-MCNC: 21 MG/DL (ref 6–20)
BUN SERPL-MCNC: 23 MG/DL (ref 6–20)
BUN/CREAT SERPL: 14 (ref 9–20)
BUN/CREAT SERPL: 16.9 (ref 7–25)
CALCIUM SERPL-MCNC: 10.2 MG/DL (ref 8.7–10.2)
CALCIUM SPEC-SCNC: 9.2 MG/DL (ref 8.6–10.5)
CHLORIDE SERPL-SCNC: 91 MMOL/L (ref 96–106)
CHLORIDE SERPL-SCNC: 99 MMOL/L (ref 98–107)
CHOLEST SERPL-MCNC: 164 MG/DL (ref 100–199)
CLARITY UR: CLEAR
CO2 SERPL-SCNC: 20 MMOL/L (ref 20–29)
CO2 SERPL-SCNC: 23.8 MMOL/L (ref 22–29)
COLOR UR: YELLOW
CREAT SERPL-MCNC: 1.24 MG/DL (ref 0.76–1.27)
CREAT SERPL-MCNC: 1.66 MG/DL (ref 0.76–1.27)
DEPRECATED RDW RBC AUTO: 43 FL (ref 37–54)
EGFRCR SERPLBLD CKD-EPI 2021: 59 ML/MIN/1.73
EGFRCR SERPLBLD CKD-EPI 2021: 83.8 ML/MIN/1.73
EOSINOPHIL # BLD AUTO: 0.21 10*3/MM3 (ref 0–0.4)
EOSINOPHIL NFR BLD AUTO: 3.7 % (ref 0.3–6.2)
ERYTHROCYTE [DISTWIDTH] IN BLOOD BY AUTOMATED COUNT: 13.6 % (ref 12.3–15.4)
GLOBULIN SER CALC-MCNC: 3.2 G/DL (ref 1.5–4.5)
GLOBULIN UR ELPH-MCNC: 3.1 GM/DL
GLUCOSE BLDC GLUCOMTR-MCNC: 299 MG/DL (ref 70–130)
GLUCOSE SERPL-MCNC: 305 MG/DL (ref 65–99)
GLUCOSE SERPL-MCNC: 793 MG/DL (ref 65–99)
GLUCOSE UR STRIP-MCNC: ABNORMAL MG/DL
HBA1C MFR BLD: >15.5 % (ref 4.8–5.6)
HCT VFR BLD AUTO: 44.5 % (ref 37.5–51)
HDLC SERPL-MCNC: 38 MG/DL
HGB BLD-MCNC: 15.1 G/DL (ref 13–17.7)
HGB UR QL STRIP.AUTO: NEGATIVE
HYALINE CASTS UR QL AUTO: NORMAL /LPF
IMM GRANULOCYTES # BLD AUTO: 0 10*3/MM3 (ref 0–0.05)
IMM GRANULOCYTES NFR BLD AUTO: 0 % (ref 0–0.5)
IMP & REVIEW OF LAB RESULTS: NORMAL
KETONES UR QL STRIP: ABNORMAL
LDLC SERPL CALC-MCNC: 66 MG/DL (ref 0–99)
LEUKOCYTE ESTERASE UR QL STRIP.AUTO: NEGATIVE
LYMPHOCYTES # BLD AUTO: 2.6 10*3/MM3 (ref 0.7–3.1)
LYMPHOCYTES NFR BLD AUTO: 46.1 % (ref 19.6–45.3)
MCH RBC QN AUTO: 29.3 PG (ref 26.6–33)
MCHC RBC AUTO-ENTMCNC: 33.9 G/DL (ref 31.5–35.7)
MCV RBC AUTO: 86.4 FL (ref 79–97)
MONOCYTES # BLD AUTO: 0.24 10*3/MM3 (ref 0.1–0.9)
MONOCYTES NFR BLD AUTO: 4.3 % (ref 5–12)
NEUTROPHILS NFR BLD AUTO: 2.55 10*3/MM3 (ref 1.7–7)
NEUTROPHILS NFR BLD AUTO: 45.2 % (ref 42.7–76)
NITRITE UR QL STRIP: NEGATIVE
NRBC BLD AUTO-RTO: 0 /100 WBC (ref 0–0.2)
PH UR STRIP.AUTO: 6 [PH] (ref 5–8)
PLATELET # BLD AUTO: 232 10*3/MM3 (ref 140–450)
PMV BLD AUTO: 11.1 FL (ref 6–12)
POTASSIUM SERPL-SCNC: 4 MMOL/L (ref 3.5–5.2)
POTASSIUM SERPL-SCNC: 5.1 MMOL/L (ref 3.5–5.2)
PROT SERPL-MCNC: 7.8 G/DL (ref 6–8.5)
PROT SERPL-MCNC: 8 G/DL (ref 6–8.5)
PROT UR QL STRIP: ABNORMAL
RBC # BLD AUTO: 5.15 10*6/MM3 (ref 4.14–5.8)
RBC # UR STRIP: NORMAL /HPF
REF LAB TEST METHOD: NORMAL
SODIUM SERPL-SCNC: 130 MMOL/L (ref 134–144)
SODIUM SERPL-SCNC: 136 MMOL/L (ref 136–145)
SP GR UR STRIP: >=1.03 (ref 1–1.03)
SQUAMOUS #/AREA URNS HPF: NORMAL /HPF
TRIGL SERPL-MCNC: 385 MG/DL (ref 0–149)
UROBILINOGEN UR QL STRIP: ABNORMAL
VIT B12 SERPL-MCNC: 1200 PG/ML (ref 232–1245)
VLDLC SERPL CALC-MCNC: 60 MG/DL (ref 5–40)
WBC # UR STRIP: NORMAL /HPF
WBC NRBC COR # BLD: 5.64 10*3/MM3 (ref 3.4–10.8)

## 2022-04-22 PROCEDURE — 82010 KETONE BODYS QUAN: CPT | Performed by: EMERGENCY MEDICINE

## 2022-04-22 PROCEDURE — 81001 URINALYSIS AUTO W/SCOPE: CPT | Performed by: EMERGENCY MEDICINE

## 2022-04-22 PROCEDURE — 36415 COLL VENOUS BLD VENIPUNCTURE: CPT

## 2022-04-22 PROCEDURE — 82962 GLUCOSE BLOOD TEST: CPT

## 2022-04-22 PROCEDURE — 99283 EMERGENCY DEPT VISIT LOW MDM: CPT

## 2022-04-22 PROCEDURE — 80053 COMPREHEN METABOLIC PANEL: CPT | Performed by: EMERGENCY MEDICINE

## 2022-04-22 PROCEDURE — 85025 COMPLETE CBC W/AUTO DIFF WBC: CPT | Performed by: EMERGENCY MEDICINE

## 2022-04-22 RX ADMIN — SODIUM CHLORIDE 1000 ML: 9 INJECTION, SOLUTION INTRAVENOUS at 12:52

## 2022-04-22 NOTE — TELEPHONE ENCOUNTER
After called patient about his critical blood glucose lab result.  I received some other prelimnary lab results.  His Creatinine is 1.66, up from 1.26, and his eGFR is 59, down from 8.2.  I talked it over with Dr. Pike and it is concerning for possible DKA.  I tried to call patient back but he did not answer.  I was able to get a hold of his emergency contact, his mother, and informed her about the previous call I had with her son and that after getting his kidney function back, that he needs to go to the ER.  His mother states that yesterday when he came over he was urinating a lot and was very thristy.  I reiterated my concern of possible DKA and that he needs to go to the ER now.  Kaylynn stated that she will get a hold of him and take him to [facility] ER.

## 2022-04-22 NOTE — PROGRESS NOTES
Was able to get in contact with patient today after a couple attempts regarding use of his Omnipods, on 04/05/22, he told the care coordinator that he hadn't been changing as frequently due to pain.  Today he endorsed that the pain has improved and he is changing them regularly.    Though review of chart shows elevated BG and BHB and he is in ED today for evaluation for possible DKA.    Yuliya Humphrey, PharmD  4/22/2022  14:48 EDT

## 2022-04-22 NOTE — DISCHARGE INSTRUCTIONS
Take your insulin as instructed by your endocrinologist.  Drink fluids.  Make sure you go to your appointment on Monday at 2:30.  Return for worsening symptoms
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
## 2022-04-22 NOTE — TELEPHONE ENCOUNTER
I was told [facility] called with a critical blood glucose of 793.  I called and talked to patient.  He is currently on Omnipod and states that he has been taking his boluses, but his dexcom and his finger sticks have been reading high.  I advised him to take off his omnipod and start back on manual injections.  He is to take Lantus 24 units daily and Novolog 6 units with each meal.  I advised that he can increase Novolog by 2 units (8, then 10, then 12 and so on) until his blood sugars start to come down.  He was told that after starting manual injections if his blood sugar still reads high or is not coming down then he needs to go to ER.

## 2022-04-25 ENCOUNTER — OFFICE VISIT (OUTPATIENT)
Dept: ENDOCRINOLOGY | Age: 22
End: 2022-04-25

## 2022-04-25 VITALS
DIASTOLIC BLOOD PRESSURE: 70 MMHG | HEIGHT: 72 IN | HEART RATE: 63 BPM | WEIGHT: 156.8 LBS | BODY MASS INDEX: 21.24 KG/M2 | OXYGEN SATURATION: 98 % | SYSTOLIC BLOOD PRESSURE: 120 MMHG

## 2022-04-25 DIAGNOSIS — R76.8 POSITIVE GAD ANTIBODY: ICD-10-CM

## 2022-04-25 DIAGNOSIS — E10.65 TYPE 1 DIABETES MELLITUS WITH HYPERGLYCEMIA: Primary | ICD-10-CM

## 2022-04-25 DIAGNOSIS — E78.49 OTHER HYPERLIPIDEMIA: ICD-10-CM

## 2022-04-25 DIAGNOSIS — E53.8 VITAMIN B 12 DEFICIENCY: ICD-10-CM

## 2022-04-25 PROCEDURE — 99214 OFFICE O/P EST MOD 30 MIN: CPT | Performed by: NURSE PRACTITIONER

## 2022-04-25 RX ORDER — INSULIN DEGLUDEC INJECTION 100 U/ML
18-30 INJECTION, SOLUTION SUBCUTANEOUS DAILY
Qty: 4 PEN | Refills: 5 | Status: SHIPPED | OUTPATIENT
Start: 2022-04-25 | End: 2022-11-21

## 2022-04-25 RX ORDER — INSULIN ASPART 100 [IU]/ML
INJECTION, SOLUTION INTRAVENOUS; SUBCUTANEOUS
Qty: 30 ML | Refills: 1 | Status: SHIPPED | OUTPATIENT
Start: 2022-04-25 | End: 2022-12-20 | Stop reason: SDUPTHER

## 2022-04-25 RX ORDER — CHLORPHENIR/PHENYLEPH/ASPIRIN 2-7.8-325
1 TABLET, EFFERVESCENT ORAL AS NEEDED
Qty: 50 EACH | Refills: 1 | Status: SHIPPED | OUTPATIENT
Start: 2022-04-25 | End: 2022-08-17

## 2022-04-25 NOTE — PROGRESS NOTES
Chief Complaint  Chief Complaint   Patient presents with   • Diabetes       Subjective          History of Present Illness    Raquel Sparks II 21 y.o. presents for a follow-up evaluation for type 1 DM     He has been diabetic since October 2021 and started insulin in November 2021     Pt is on the following medications for their DM: Lantus 18 units daily and Novolog 6 units before each meal. Dexcom    Omnipod was stopped due to high BGs and he was started back on manual injections      Denies diarrhea, constipation, chest pain, shortness of breath, vision changes, numbness and tingling in feet/hands.    Weight gain of 5 lbs since last visit. 151    Pt does not have a history of DM retinopathy.  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     Pt does not have a history of nephropathy.  Patient is not currently taking ACE/ARB     Pt does not have neuropathy.     Pt does not have a history of CAD or CVA.    Last A1C in 04/21 was >15.5    Last microalbumin in 11/21 was negative          Blood Sugars    Blood glucoses are checked dexcom- but has been out since the 19th.  Otherwise he has been check 1-2 times daily    Fasting blood glucoses: high 100s to 200s    Pre-meal blood glucoses: high 100s to 200s    Pt has rare episodes of hypoglycemia.  Pt states that his BG drops while at work randomly, but I have no readings of this.            Hyperlipidemia     Pt denies any muscle/body aches, chest pain, or shortness of breath    Pt is currently taking atorvastatin 20 mg HS??    Last lipid panel in 04/22 showed Total 164, Triglycerides 385, HDL 38 and LDL 66            Vitamin B12 Deficiency     Current treatment is dietary modification    Last B12 in 04/21 was 1,200              Pt is seeing Dr. Ford for pituitary adenoma, incidental finding on work up for shaking.  MRI of the pituitary done in November 2021 showed a localized widening of the left side of the sella  "turcica containing his CSF consistent with small arachnoid cyst or partially empty sella. Pt is following with Neurology for shaking.        I have reviewed the patient's allergies, medicines, past medical hx, family hx and social hx.    Objective   Vital Signs:   /70   Pulse 63   Ht 182.9 cm (72\")   Wt 71.1 kg (156 lb 12.8 oz)   SpO2 98%   BMI 21.27 kg/m²       Physical Exam   Physical Exam  Constitutional:       General: He is not in acute distress.     Appearance: Normal appearance. He is not diaphoretic.   HENT:      Head: Normocephalic and atraumatic.   Eyes:      General:         Right eye: No discharge.         Left eye: No discharge.   Skin:     General: Skin is warm and dry.   Neurological:      Mental Status: He is alert.   Psychiatric:         Mood and Affect: Mood normal.         Behavior: Behavior normal.                    Results Review:   Hemoglobin A1C   Date Value Ref Range Status   04/21/2022 >15.5 (H) 4.8 - 5.6 % Final     Comment:     **Verified by repeat analysis**           Prediabetes: 5.7 - 6.4           Diabetes: >6.4           Glycemic control for adults with diabetes: <7.0     11/11/2021 13.80 (H) 4.80 - 5.60 % Final     Triglycerides   Date Value Ref Range Status   04/21/2022 385 (H) 0 - 149 mg/dL Final     HDL Cholesterol   Date Value Ref Range Status   04/21/2022 38 (L) >39 mg/dL Final     LDL Chol Calc (NIH)   Date Value Ref Range Status   04/21/2022 66 0 - 99 mg/dL Final     VLDL Cholesterol Jeffrey   Date Value Ref Range Status   04/21/2022 60 (H) 5 - 40 mg/dL Final         Assessment and Plan {CC Problem List  Visit Diagnosis  ROS  Review (Popup)  Health Maintenance  Quality  BestPractice  Medications  SmartSets  SnapShot Encounters  Media :23  Diagnoses and all orders for this visit:    1. Type 1 diabetes mellitus with hyperglycemia (HCC) (Primary)  -     Acetone, Urine, Test (Ketone Test) strip; 1 each by In Vitro route As Needed (Use to test for Ketones when " blood sugar was high).  Dispense: 50 each; Refill: 1  -     Nutritional Supplements (Glucose Management) tablet; 15 grams for blood sugar less than 70. Wait 15 minutes and if still not up then repeat  Dispense: 100 tablet; Refill: 2  -     insulin aspart (NovoLOG FlexPen) 100 UNIT/ML solution pen-injector sc pen; 8 units before each meal  Dispense: 30 mL; Refill: 1    A1C is worse at > 15.5%  Diabetis is out of control  Continue off Omnipod at this time  Change Lantus to Tresiba  Increase Lantus 24 units daily   Increase Novolog 8 units before each meal.   Continue with Dexcom  Download dexcom in 10 days  Keep appointment with Dr. Pike in a little over a month  Referral for diabetic education already placed last Friday      2. Positive MARLENI antibody    3. Vitamin B 12 deficiency    Vitamin B 12 levels are normal  Continue with dietary modification      4. Other hyperlipidemia     Total cholesterol and LDL are normal, continue with statin.  Triglycerides are elevated, decrease dietary fat and hopefully this will also come down once diabetes is better controlled.        No refills needed at this time      RTC on 06/01/22 months with Dr. Pike      Follow Up     Patient was given instructions and counseling regarding her condition or for health maintenance advice. Please see specific information pulled into the AVS if appropriate.              Shelbi Lechuga, MONE  04/25/22

## 2022-04-25 NOTE — PATIENT INSTRUCTIONS
Lantus 24 units daily- change to Tresiba when you get it filled  Novolog 8 units before each meal.   Continue with Dexcom  Fax [phone]  Download dexcom in 10 days

## 2022-04-26 ENCOUNTER — TELEPHONE (OUTPATIENT)
Dept: ENDOCRINOLOGY | Age: 22
End: 2022-04-26

## 2022-04-26 ENCOUNTER — SPECIALTY PHARMACY (OUTPATIENT)
Dept: ENDOCRINOLOGY | Age: 22
End: 2022-04-26

## 2022-04-27 ENCOUNTER — TELEPHONE (OUTPATIENT)
Dept: ENDOCRINOLOGY | Age: 22
End: 2022-04-27

## 2022-04-28 ENCOUNTER — TELEPHONE (OUTPATIENT)
Dept: ENDOCRINOLOGY | Age: 22
End: 2022-04-28

## 2022-04-28 NOTE — PROGRESS NOTES
Omnipod discontinued for now per APRN, due to severely uncontrolled DM.  He back to basal-bolus regimen.      Yuliya Humphrey, PharmD  4/28/2022  09:58 EDT

## 2022-05-06 ENCOUNTER — OFFICE VISIT (OUTPATIENT)
Dept: ENDOCRINOLOGY | Age: 22
End: 2022-05-06

## 2022-05-06 VITALS
TEMPERATURE: 97.1 F | SYSTOLIC BLOOD PRESSURE: 116 MMHG | DIASTOLIC BLOOD PRESSURE: 72 MMHG | HEART RATE: 64 BPM | WEIGHT: 162.2 LBS | BODY MASS INDEX: 21.97 KG/M2 | HEIGHT: 72 IN | OXYGEN SATURATION: 99 %

## 2022-05-06 DIAGNOSIS — E10.65 TYPE 1 DIABETES MELLITUS WITH HYPERGLYCEMIA: Primary | ICD-10-CM

## 2022-05-06 DIAGNOSIS — E78.49 OTHER HYPERLIPIDEMIA: ICD-10-CM

## 2022-05-06 PROCEDURE — 99214 OFFICE O/P EST MOD 30 MIN: CPT | Performed by: NURSE PRACTITIONER

## 2022-05-06 PROCEDURE — 95251 CONT GLUC MNTR ANALYSIS I&R: CPT | Performed by: NURSE PRACTITIONER

## 2022-05-06 RX ORDER — DEXTROSE 4 G
TABLET,CHEWABLE ORAL
COMMUNITY
Start: 2022-04-25

## 2022-05-06 RX ORDER — INSULIN ASPART 100 [IU]/ML
INJECTION, SOLUTION INTRAVENOUS; SUBCUTANEOUS
COMMUNITY
Start: 2022-04-23 | End: 2022-07-25

## 2022-05-06 NOTE — PROGRESS NOTES
Chief Complaint  Chief Complaint   Patient presents with   • Diabetes     Follow up appt. Decom        Subjective          History of Present Illness    Raquel Sparks II 21 y.o. presents for a follow-up evaluation for type 1 DM     He has been diabetic since October 2021 and started insulin in November 2021     Pt is on the following medications for their DM: Tresiba 24 units daily and Novolog 8 units before each meal. Dexcom     Omnipod was stopped due to high BGs and he was started back on manual injections        Denies diarrhea, constipation, chest pain, shortness of breath, vision changes, numbness and tingling in feet/hands.     Pt does not have a history of DM retinopathy.  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     Pt does not have a history of nephropathy.  Patient is not currently taking ACE/ARB     Pt does not have neuropathy.     Pt does not have a history of CAD or CVA.     Last A1C in 04/21 was >15.5     Last microalbumin in 11/21 was negative        Blood Sugars    Blood glucoses are checked via dexcom.    Fasting blood glucoses: mid 100s to 300s    Pre-meal blood glucoses: 40s to 300s    Pt has some episodes of hypoglycemia, which look to be related to over correcting high blood glucoses.        Sensor Data    Time in range 26%  High 22%  Very high 50%  Low 1%  Very low <1%      Average Glucose - 248 mg/dL      Sensor Wear - 71 %       Pt gets up and eats breakfast around 9:30 am.  Lunch is between 11 am and 12 pm.  Dinner is between 5 and 6:30 pm.  He has a snack between 1 am and 2 am.          Hyperlipidemia     Pt is currently taking nothing at this time    Last lipid panel in 04/22 showed Total 164, Triglycerides 385, HDL 38 and LDL 66                I have reviewed the patient's allergies, medicines, past medical hx, family hx and social hx.    Objective   Vital Signs:   /72   Pulse 64   Temp 97.1 °F (36.2 °C) (Temporal)   Ht 182.9  "cm (72\")   Wt 73.6 kg (162 lb 3.2 oz)   SpO2 99%   BMI 22.00 kg/m²       Physical Exam   Physical Exam  Constitutional:       General: He is not in acute distress.     Appearance: Normal appearance. He is not diaphoretic.   HENT:      Head: Normocephalic and atraumatic.   Eyes:      General:         Right eye: No discharge.         Left eye: No discharge.   Skin:     General: Skin is warm and dry.   Neurological:      Mental Status: He is alert.   Psychiatric:         Mood and Affect: Mood normal.         Behavior: Behavior normal.                    Results Review:   Hemoglobin A1C   Date Value Ref Range Status   04/21/2022 >15.5 (H) 4.8 - 5.6 % Final     Comment:     **Verified by repeat analysis**           Prediabetes: 5.7 - 6.4           Diabetes: >6.4           Glycemic control for adults with diabetes: <7.0     11/11/2021 13.80 (H) 4.80 - 5.60 % Final     Triglycerides   Date Value Ref Range Status   04/21/2022 385 (H) 0 - 149 mg/dL Final     HDL Cholesterol   Date Value Ref Range Status   04/21/2022 38 (L) >39 mg/dL Final     LDL Chol Calc (NIH)   Date Value Ref Range Status   04/21/2022 66 0 - 99 mg/dL Final     VLDL Cholesterol Jeffrey   Date Value Ref Range Status   04/21/2022 60 (H) 5 - 40 mg/dL Final         Assessment and Plan {CC Problem List  Visit Diagnosis  ROS  Review (Popup)  Health Maintenance  Quality  BestPractice  Medications  SmartSets  SnapShot Encounters  Media :23  Diagnoses and all orders for this visit:    1. Type 1 diabetes mellitus with hyperglycemia (HCC) (Primary)    IncreaseTresiba 28 units daily  Continue Novolog 8 units before breakfast and lunch  Increase Novolog 10 units before dinner  If taking just a correction (not eating, just fixing high blood sugar) take 3 units  Continue with Dexcom      2. Other hyperlipidemia    Continue with dietary modification and working on bring down BGs        No refills needed at this time      RTC on 06/01/22 with Dr." Yson      Follow Up     Patient was given instructions and counseling regarding her condition or for health maintenance advice. Please see specific information pulled into the AVS if appropriate.              MONE Carter  05/06/22

## 2022-05-06 NOTE — PATIENT INSTRUCTIONS
IncreaseTresiba 28 units daily  Continue Novolog 8 units before breakfast and lunch  Increase Novolog 10 units before dinner  If taking just a correction (not eating, just fixing high blood sugar) take 3 units

## 2022-05-31 RX ORDER — PROCHLORPERAZINE 25 MG/1
SUPPOSITORY RECTAL
Qty: 1 EACH | Refills: 1 | Status: SHIPPED | OUTPATIENT
Start: 2022-05-31 | End: 2023-02-18 | Stop reason: SDUPTHER

## 2022-06-13 ENCOUNTER — TELEPHONE (OUTPATIENT)
Dept: ENDOCRINOLOGY | Age: 22
End: 2022-06-13

## 2022-06-13 NOTE — TELEPHONE ENCOUNTER
PATIENT'S MOM VINCENT CALLED PATIENT IS IN THE HOSPITAL AND WANTS YOU TO CALL HER BACK ASA.    [phone]   ear foreign body

## 2022-06-13 NOTE — TELEPHONE ENCOUNTER
Pt mother called  wanted to speak with  you in regards to son stefanie glucose level  he is currently admitted   pt mother will explain

## 2022-06-20 ENCOUNTER — OFFICE VISIT (OUTPATIENT)
Dept: ENDOCRINOLOGY | Age: 22
End: 2022-06-20

## 2022-06-20 VITALS
HEART RATE: 96 BPM | SYSTOLIC BLOOD PRESSURE: 116 MMHG | WEIGHT: 155.8 LBS | DIASTOLIC BLOOD PRESSURE: 70 MMHG | BODY MASS INDEX: 21.1 KG/M2 | HEIGHT: 72 IN | TEMPERATURE: 98.5 F | OXYGEN SATURATION: 96 %

## 2022-06-20 DIAGNOSIS — E10.65 TYPE 1 DIABETES MELLITUS WITH HYPERGLYCEMIA: Primary | ICD-10-CM

## 2022-06-20 DIAGNOSIS — E78.49 OTHER HYPERLIPIDEMIA: ICD-10-CM

## 2022-06-20 DIAGNOSIS — R76.8 POSITIVE GAD ANTIBODY: ICD-10-CM

## 2022-06-20 PROCEDURE — 95251 CONT GLUC MNTR ANALYSIS I&R: CPT | Performed by: NURSE PRACTITIONER

## 2022-06-20 PROCEDURE — 99214 OFFICE O/P EST MOD 30 MIN: CPT | Performed by: NURSE PRACTITIONER

## 2022-06-20 RX ORDER — RISPERIDONE 1 MG/1
1 TABLET ORAL 2 TIMES DAILY
COMMUNITY
Start: 2022-06-13 | End: 2023-01-12

## 2022-06-20 NOTE — PATIENT INSTRUCTIONS
Tresiba 30 units daily  Novolog 10 units before each meal  Novolog 4 units before  Limit 45 grams of carb per meal and 15-20 grams of carbs per snack

## 2022-06-20 NOTE — PROGRESS NOTES
Chief Complaint  Chief Complaint   Patient presents with   • Diabetes     Type 1       Subjective          History of Present Illness    Raquel Sparks II 21 y.o.  presents for a follow-up evaluation for type 1 DM     He has been diabetic since October 2021 and started insulin in November 2021     Pt is on the following medications for their DM: Tresiba 22 units daily and Novolog 8 units before breakfast and lunch and 6 units before dinner. Dexcom     Omnipod was stopped due to high BGs and he was started back on manual injections        Pt complains of constipation, diarrhea, chest pain, shortness of breath, numbness and tingling in feet/hands, vision changes.    Denies diarrhea, constipation, chest pain, shortness of breath, vision changes, numbness and tingling in feet/hands.    Weight loss of 7 lbs since last visit.    Pt does not have a history of DM retinopathy.  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     Pt does not have a history of nephropathy.  Patient is not currently taking ACE/ARB     Pt does not have neuropathy.     Pt does not have a history of CAD or CVA.     Last A1C in 04/21 was >15.5     Last microalbumin in 11/21 was negative            Blood Sugars    Blood glucoses are checked via dexcom.    Fasting blood glucoses: 300s    Pre-meal blood glucoses: 300s    Pt has no episodes of hypoglycemia.          Sensor Data    Time in range 9%  High 15%  Very high 76%  Low 0%  Very low 0%      Average Glucose - 318 mg/dL      Sensor Wear - 29 %    Pt just recently got out of [facility] and he was not allowed to use it while in there.          Pt gets up and eats breakfast around 9:30 am.  Lunch is between 11 am and 12 pm.  Dinner is between 5 and 6:30 pm.  He has a snack between 1 am and 2 am.            Hyperlipidemia     Pt is currently taking  nothing at this time - he is suppose to be taking atorvastatin 20 mg 2 tablets daily, but has not been  "taking    Last lipid panel in 04/22 showed Total 164, Triglycerides 385, HDL 38 and LDL 66                  Pt was recently admitted to [facility].  He is to follow up with [facility] for a diagnosis and treatment.  He is currently on risperidone 1 mg BID            I have reviewed the patient's allergies, medicines, past medical hx, family hx and social hx.    Objective   Vital Signs:   /70   Pulse 96   Temp 98.5 °F (36.9 °C)   Ht 182.9 cm (72.01\")   Wt 70.7 kg (155 lb 12.8 oz)   SpO2 96%   BMI 21.13 kg/m²       Physical Exam   Physical Exam  Constitutional:       General: He is not in acute distress.     Appearance: Normal appearance. He is not diaphoretic.   HENT:      Head: Normocephalic and atraumatic.   Eyes:      General:         Right eye: No discharge.         Left eye: No discharge.   Skin:     General: Skin is warm and dry.   Neurological:      Mental Status: He is alert.   Psychiatric:         Mood and Affect: Mood normal.         Behavior: Behavior normal.                    Results Review:   Hemoglobin A1C   Date Value Ref Range Status   04/21/2022 >15.5 (H) 4.8 - 5.6 % Final     Comment:     **Verified by repeat analysis**           Prediabetes: 5.7 - 6.4           Diabetes: >6.4           Glycemic control for adults with diabetes: <7.0     11/11/2021 13.80 (H) 4.80 - 5.60 % Final     Triglycerides   Date Value Ref Range Status   04/21/2022 385 (H) 0 - 149 mg/dL Final     HDL Cholesterol   Date Value Ref Range Status   04/21/2022 38 (L) >39 mg/dL Final     LDL Chol Calc (NIH)   Date Value Ref Range Status   04/21/2022 66 0 - 99 mg/dL Final     VLDL Cholesterol Jeffrey   Date Value Ref Range Status   04/21/2022 60 (H) 5 - 40 mg/dL Final         Assessment and Plan {CC Problem List  Visit Diagnosis  ROS  Review (Popup)  Health Maintenance  Quality  BestPractice  Medications  SmartSets  SnapShot Encounters  Media :23  Diagnoses and all orders for this visit:    1. Type 1 " diabetes mellitus with hyperglycemia (HCC) (Primary)  -     Hemoglobin A1c; Future  -     Comprehensive Metabolic Panel; Future  -     Lipid Panel; Future    Increase Tresiba 30 units daily  Change Novolog 10 units before each meal  Change Novolog 4 units before  Limit 45 grams of carb per meal and 15-20 grams of carbs per snack  Pt has appointment with diabetes educator - advised to keep  Continue with dexcom  Advised pt that it is important to [place] and get help for his mental health, otherwise it will impact his all over health and well being.        2. Positive MARLENI antibody    3. Other hyperlipidemia  -     Comprehensive Metabolic Panel; Future  -     Lipid Panel; Future    Advised patient to go back on atorvastatin        No refills needed at this time      RTC in 1 month with me, labs prior and 6 months with Dr. Pike      Follow Up     Patient was given instructions and counseling regarding her condition or for health maintenance advice. Please see specific information pulled into the AVS if appropriate.              Shelbi Lechuga, MONE  06/20/22

## 2022-06-28 ENCOUNTER — HOSPITAL ENCOUNTER (OUTPATIENT)
Dept: DIABETES SERVICES | Facility: HOSPITAL | Age: 22
Discharge: HOME | End: 2022-06-28

## 2022-06-28 ENCOUNTER — APPOINTMENT (OUTPATIENT)
Dept: DIABETES SERVICES | Facility: HOSPITAL | Age: 22
End: 2022-06-28

## 2022-06-28 PROCEDURE — G0108 DIAB MANAGE TRN  PER INDIV: HCPCS

## 2022-07-07 ENCOUNTER — LAB (OUTPATIENT)
Dept: ENDOCRINOLOGY | Age: 22
End: 2022-07-07

## 2022-07-07 DIAGNOSIS — E10.65 TYPE 1 DIABETES MELLITUS WITH HYPERGLYCEMIA: ICD-10-CM

## 2022-07-07 DIAGNOSIS — E78.49 OTHER HYPERLIPIDEMIA: ICD-10-CM

## 2022-07-08 LAB
ALBUMIN SERPL-MCNC: 4.9 G/DL (ref 4.1–5.2)
ALBUMIN/GLOB SERPL: 1.8 {RATIO} (ref 1.2–2.2)
ALP SERPL-CCNC: 95 IU/L (ref 44–121)
ALT SERPL-CCNC: 17 IU/L (ref 0–44)
AST SERPL-CCNC: 21 IU/L (ref 0–40)
BILIRUB SERPL-MCNC: 0.4 MG/DL (ref 0–1.2)
BUN SERPL-MCNC: 15 MG/DL (ref 6–20)
BUN/CREAT SERPL: 13 (ref 9–20)
CALCIUM SERPL-MCNC: 9.6 MG/DL (ref 8.7–10.2)
CHLORIDE SERPL-SCNC: 102 MMOL/L (ref 96–106)
CHOLEST SERPL-MCNC: 155 MG/DL (ref 100–199)
CO2 SERPL-SCNC: 23 MMOL/L (ref 20–29)
CREAT SERPL-MCNC: 1.19 MG/DL (ref 0.76–1.27)
EGFRCR SERPLBLD CKD-EPI 2021: 88 ML/MIN/1.73
GLOBULIN SER CALC-MCNC: 2.8 G/DL (ref 1.5–4.5)
GLUCOSE SERPL-MCNC: 122 MG/DL (ref 65–99)
HBA1C MFR BLD: 11.5 % (ref 4.8–5.6)
HDLC SERPL-MCNC: 60 MG/DL
IMP & REVIEW OF LAB RESULTS: NORMAL
LDLC SERPL CALC-MCNC: 84 MG/DL (ref 0–99)
POTASSIUM SERPL-SCNC: 4.4 MMOL/L (ref 3.5–5.2)
PROT SERPL-MCNC: 7.7 G/DL (ref 6–8.5)
SODIUM SERPL-SCNC: 139 MMOL/L (ref 134–144)
TRIGL SERPL-MCNC: 52 MG/DL (ref 0–149)
VLDLC SERPL CALC-MCNC: 11 MG/DL (ref 5–40)

## 2022-07-25 ENCOUNTER — TELEPHONE (OUTPATIENT)
Dept: ENDOCRINOLOGY | Age: 22
End: 2022-07-25

## 2022-08-16 DIAGNOSIS — E10.65 TYPE 1 DIABETES MELLITUS WITH HYPERGLYCEMIA: ICD-10-CM

## 2022-08-16 RX ORDER — ATORVASTATIN CALCIUM 20 MG/1
TABLET, FILM COATED ORAL
Qty: 90 TABLET | Refills: 2 | Status: SHIPPED | OUTPATIENT
Start: 2022-08-16 | End: 2022-12-20 | Stop reason: SDUPTHER

## 2022-08-17 RX ORDER — CHLORPHENIR/PHENYLEPH/ASPIRIN 2-7.8-325
TABLET, EFFERVESCENT ORAL
Qty: 100 STRIP | Refills: 1 | Status: SHIPPED | OUTPATIENT
Start: 2022-08-17 | End: 2022-12-20 | Stop reason: SDUPTHER

## 2022-09-19 ENCOUNTER — HOSPITAL ENCOUNTER (EMERGENCY)
Facility: HOSPITAL | Age: 22
Discharge: HOME | End: 2022-09-19
Attending: EMERGENCY MEDICINE | Admitting: EMERGENCY MEDICINE

## 2022-09-19 ENCOUNTER — APPOINTMENT (OUTPATIENT)
Dept: GENERAL RADIOLOGY | Facility: HOSPITAL | Age: 22
End: 2022-09-19

## 2022-09-19 VITALS
TEMPERATURE: 97.4 F | SYSTOLIC BLOOD PRESSURE: 137 MMHG | RESPIRATION RATE: 16 BRPM | HEART RATE: 79 BPM | DIASTOLIC BLOOD PRESSURE: 72 MMHG | OXYGEN SATURATION: 95 %

## 2022-09-19 DIAGNOSIS — S60.221A CONTUSION OF RIGHT HAND, INITIAL ENCOUNTER: Primary | ICD-10-CM

## 2022-09-19 PROCEDURE — 99282 EMERGENCY DEPT VISIT SF MDM: CPT

## 2022-09-19 PROCEDURE — 73130 X-RAY EXAM OF HAND: CPT

## 2022-09-19 NOTE — ED PROVIDER NOTES
The AMANDA and I have discussed this patient's history, physical exam, and treatment plan.  I have reviewed the documentation and personally had a face to face interaction with the patient. I affirm the documentation and agree with the treatment and plan.  The attached note describes my personal findings.      I provided a substantive portion of the care of the patient.  I personally performed the physical exam in its entirety, and below are my findings.     Brief history of present illness: 21-year-old male with right hand injury yesterday playing basketball.  Some swelling.  Right-hand-dominant.  No other injuries.    Physical exam:    /72 (BP Location: Right arm)   Pulse 79   Temp 97.4 °F (36.3 °C)   Resp 16   SpO2 95%       Physical Exam  Constitutional: No distress.  Nontoxic  HENT:  Head: Normocephalic and atraumatic.   Oropharynx: Mucous membranes are moist.   Eyes: . No scleral icterus. No conjunctival pallor.  Neck: Normal range of motion. Neck supple.   Cardiovascular: Pink warm and well perfused throughout.    Pulmonary/Chest: No respiratory distress.    Musculoskeletal: Moves all extremities equally.  Tender swelling dorsum right hand.  Full range of motion all fingers with normal cap refill and sensation throughout.  Neurological: Alert and oriented.  No acute focal deficit appreciated.  Skin: Skin is pink, warm, and dry.   Psychiatric: Mood and affect normal.   Nursing note and vitals reviewed.        MDM:  XR Hand 3+ View Right    Result Date: 9/19/2022  Narrative: THREE RADIOGRAPHIC VIEWS OF THE RIGHT HAND  CLINICAL HISTORY: Hand injury.  Three radiographic views of the right hand demonstrate no evidence for acute fracture or bony malalignment.  This report was finalized on 9/19/2022 2:02 PM by Dr. Dmitriy Lizarraga M.D.             Zion, Jhon OLIVER MD  09/19/22 [id number]
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
## 2022-09-19 NOTE — ED NOTES
Pt via PV from home with c/o R hand injury/pain while playing basketball yesterday.    All triage performed with this RN wearing appropriate PPE.  Pt placed in mask upon arrival to ED.

## 2022-09-26 ENCOUNTER — OFFICE VISIT (OUTPATIENT)
Dept: NEUROSURGERY | Facility: CLINIC | Age: 22
End: 2022-09-26

## 2022-09-26 VITALS
SYSTOLIC BLOOD PRESSURE: 116 MMHG | OXYGEN SATURATION: 98 % | TEMPERATURE: 98 F | HEART RATE: 57 BPM | HEIGHT: 72 IN | BODY MASS INDEX: 20.99 KG/M2 | DIASTOLIC BLOOD PRESSURE: 64 MMHG | WEIGHT: 155 LBS

## 2022-09-26 DIAGNOSIS — E23.6 PITUITARY CYST: Primary | ICD-10-CM

## 2022-09-26 PROCEDURE — 99213 OFFICE O/P EST LOW 20 MIN: CPT | Performed by: NEUROLOGICAL SURGERY

## 2022-09-26 RX ORDER — ATORVASTATIN CALCIUM 20 MG/1
1 TABLET, FILM COATED ORAL DAILY
COMMUNITY
Start: 2022-08-16

## 2022-09-26 NOTE — PROGRESS NOTES
"Subjective   Patient ID: Orintrushs ALEXANDRO Sparks II is a 21 y.o. male is here today for follow-up with a new pituitary MRI.     Today, Mr. Sparks reports he is doing well. He denies headaches, dizziness and visual problems.     Is very nice young man is with his mother.  He has a history of long-term diabetes.  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  We had a discovery of a pituitary cyst on the left side about 2-1/2 years ago and we have been following it.  At 1 point it was seen in the context of some visual blurring because of his elevated blood sugars but that is been under control and he has not had that episode again.  We discussed the latest MRI done about 6 months ago which is stable.  I do not think he is having any problems with this and is not likely to.  But I suggested that we look at it 1 more time in 2024 about 3 years after it was originally discovered.  If it stable at that time then we can see surveillance imaging altogether.  That is in about 17 to 18 months.  We will see him then.      History of Present Illness    The following portions of the patient's history were reviewed and updated as appropriate: allergies, current medications, past family history, past medical history, past social history, past surgical history and problem list.    Review of Systems   Constitutional: Negative for fever.   All other systems reviewed and are negative.          Objective     Vitals:    09/26/22 1303   BP: 116/64   Pulse: 57   Temp: 98 °F (36.7 °C)   SpO2: 98%   Weight: 70.3 kg (155 lb)   Height: 182.9 cm (72.01\")     Body mass index is 21.02 kg/m².      Physical Exam  Constitutional:       Appearance: He is well-developed.   HENT:      Head: Normocephalic and atraumatic.   Eyes:      Extraocular Movements: EOM normal.      Conjunctiva/sclera: Conjunctivae normal.      Pupils: Pupils are equal, round, and reactive to light.   Neck:      Vascular: No carotid bruit. "   Neurological:      Mental Status: He is oriented to person, place, and time.      Coordination: Finger-Nose-Finger Test and Heel to Shin Test normal.      Gait: Gait is intact.      Deep Tendon Reflexes:      Reflex Scores:       Tricep reflexes are 2+ on the right side and 2+ on the left side.       Bicep reflexes are 2+ on the right side and 2+ on the left side.       Brachioradialis reflexes are 2+ on the right side and 2+ on the left side.       Patellar reflexes are 2+ on the right side and 2+ on the left side.       Achilles reflexes are 2+ on the right side and 2+ on the left side.  Psychiatric:         Speech: Speech normal.       Neurologic Exam     Mental Status   Oriented to person, place, and time.   Registration of memory: Good recent and remote memory.   Attention: normal. Concentration: normal.   Speech: speech is normal   Level of consciousness: alert  Knowledge: consistent with education.     Cranial Nerves     CN II   Visual fields full to confrontation.   Visual acuity: normal  Right visual field deficit: Blind in right eye.    CN III, IV, VI   Pupils are equal, round, and reactive to light.  Extraocular motions are normal.     CN V   Facial sensation intact.   Right corneal reflex: normal  Left corneal reflex: normal    CN VII   Facial expression full, symmetric.   Right facial weakness: none  Left facial weakness: none    CN VIII   Hearing: intact    CN IX, X   Palate: symmetric    CN XI   Right sternocleidomastoid strength: normal  Left sternocleidomastoid strength: normal    CN XII   Tongue: not atrophic  Tongue deviation: none    Motor Exam   Muscle bulk: normal  Right arm tone: normal  Left arm tone: normal  Right leg tone: normal  Left leg tone: normal    Strength   Strength 5/5 except as noted.     Sensory Exam   Light touch normal.     Gait, Coordination, and Reflexes     Gait  Gait: normal    Coordination   Finger to nose coordination: normal  Heel to shin coordination:  normal    Reflexes   Right brachioradialis: 2+  Left brachioradialis: 2+  Right biceps: 2+  Left biceps: 2+  Right triceps: 2+  Left triceps: 2+  Right patellar: 2+  Left patellar: 2+  Right achilles: 2+  Left achilles: 2+  Right : 2+  Left : 2+          Assessment & Plan   Independent Review of Radiographic Studies:      I personally reviewed the images from the following studies.    The MRI of the pituitary done on 2/14/2022 shows no change in the roughly 9 x 6 x 9 mm pituitary cyst mainly on the left side.  It has not really changed since 1/6/2021.  Agree with the report.        Medical Decision Making:      Again, this is an incidental finding.  We will see him in about 17 to 18 months which is going to be about 3 years since this was originally looked at.  If it is unchanged and we can see his surveillance imaging.      Diagnoses and all orders for this visit:    1. Pituitary cyst (HCC) (Primary)  -     MRI pituitary w wo contrast; Future      Return in about 17 months (around 2/26/2024) for face to face after MRI.

## 2022-11-20 DIAGNOSIS — E10.65 TYPE 1 DIABETES MELLITUS WITH HYPERGLYCEMIA: ICD-10-CM

## 2022-11-21 RX ORDER — INSULIN DEGLUDEC INJECTION 100 U/ML
18-30 INJECTION, SOLUTION SUBCUTANEOUS DAILY
Qty: 9 ML | Refills: 1 | Status: SHIPPED | OUTPATIENT
Start: 2022-11-21 | End: 2022-12-20

## 2022-11-30 ENCOUNTER — TELEPHONE (OUTPATIENT)
Dept: ENDOCRINOLOGY | Age: 22
End: 2022-11-30

## 2022-11-30 DIAGNOSIS — E10.65 TYPE 1 DIABETES MELLITUS WITH HYPERGLYCEMIA: Primary | ICD-10-CM

## 2022-12-09 ENCOUNTER — LAB (OUTPATIENT)
Dept: LAB | Facility: HOSPITAL | Age: 22
End: 2022-12-09

## 2022-12-09 PROCEDURE — 82043 UR ALBUMIN QUANTITATIVE: CPT | Performed by: NURSE PRACTITIONER

## 2022-12-09 PROCEDURE — 80053 COMPREHEN METABOLIC PANEL: CPT | Performed by: NURSE PRACTITIONER

## 2022-12-09 PROCEDURE — 83036 HEMOGLOBIN GLYCOSYLATED A1C: CPT | Performed by: NURSE PRACTITIONER

## 2022-12-09 PROCEDURE — 80061 LIPID PANEL: CPT | Performed by: NURSE PRACTITIONER

## 2022-12-09 PROCEDURE — 82570 ASSAY OF URINE CREATININE: CPT | Performed by: NURSE PRACTITIONER

## 2022-12-20 ENCOUNTER — OFFICE VISIT (OUTPATIENT)
Dept: ENDOCRINOLOGY | Age: 22
End: 2022-12-20

## 2022-12-20 VITALS
OXYGEN SATURATION: 97 % | BODY MASS INDEX: 20.13 KG/M2 | HEART RATE: 78 BPM | HEIGHT: 72 IN | DIASTOLIC BLOOD PRESSURE: 70 MMHG | SYSTOLIC BLOOD PRESSURE: 120 MMHG | TEMPERATURE: 98.2 F | WEIGHT: 148.6 LBS

## 2022-12-20 DIAGNOSIS — E78.49 OTHER HYPERLIPIDEMIA: ICD-10-CM

## 2022-12-20 DIAGNOSIS — E10.65 TYPE 1 DIABETES MELLITUS WITH HYPERGLYCEMIA: ICD-10-CM

## 2022-12-20 DIAGNOSIS — E23.6 PITUITARY CYST: ICD-10-CM

## 2022-12-20 DIAGNOSIS — R76.8 POSITIVE GAD ANTIBODY: ICD-10-CM

## 2022-12-20 DIAGNOSIS — E10.9 TYPE 1 DIABETES MELLITUS WITHOUT COMPLICATION: Primary | ICD-10-CM

## 2022-12-20 PROBLEM — E10.10 DIABETIC KETOACIDOSIS WITHOUT COMA ASSOCIATED WITH TYPE 1 DIABETES MELLITUS (HCC): Status: RESOLVED | Noted: 2021-11-11 | Resolved: 2022-12-20

## 2022-12-20 PROCEDURE — 99214 OFFICE O/P EST MOD 30 MIN: CPT | Performed by: INTERNAL MEDICINE

## 2022-12-20 RX ORDER — INSULIN DEGLUDEC INJECTION 100 U/ML
24 INJECTION, SOLUTION SUBCUTANEOUS DAILY
Qty: 9 ML | Refills: 1 | Status: SHIPPED
Start: 2022-12-20 | End: 2023-02-02 | Stop reason: SDUPTHER

## 2022-12-20 NOTE — PROGRESS NOTES
Macey Sparks II is a 22 y.o. male.     History of Present Illness        He was diagnosed to have diabetes mellitus in October 2021 and was initially treated with Metformin.  He was admitted to the hospital in November 2021 and diabetic ketoacidosis and insulin was started.  MARLENI antibody was elevated.  He had difficulty with Omnipod in the past.  Hemoglobin A1c done in December 2022 is 13.1%.    He is on Tresiba 18 units every PM and Novolog 8 units TID + sliding scale.  He has been off Dexcom for 1 month.  -232.       His last eye examination was 12/22.  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  He denies blurry vision.  He denies numbness, tingling or burning in his hands or feet.  Urine microalbumin is normal in December 2022.     He has hyperlipidemia and was is on atorvastatin 20 mg/day.  He denies myalgia.  Lipid panel done in December 2022 are as follows: Cholesterol 169.  HDL 51.  LDL 96.  Triglycerides 122.     MRI of the pituitary done in November 2021 showed a localized widening of the left side of the sella turcica containing his CSF consistent with small arachnoid cyst or partially empty sella.  Lab studies done in November 2021 are as follows: Normal prolactin at 14.30.  Normal TSH of 0.966.  Normal free T4 of 1.36 ng per DL.  Normal free T3 at 2.43 pg/mL.  He was seen by Dr. Ford and has a follow-up with him.     He denies easy bruising.  He denies muscle weakness.  He denies heat or cold intolerance.  He denies bowel changes.  He denies changes in shoes or ring size.     He has fathered 2 children with the youngest age 1 year.    He was hospitalized at [facility] for mental breakdown.  He stopped taking Risperdal on his own shortly after discharge.  He denies suicidal or homicidal ideation.  He has not made an appointment with a therapist.    The following portions of the patient's history were reviewed and updated as appropriate:  "allergies, current medications, past family history, past medical history, past social history, past surgical history and problem list.    Review of Systems   Eyes: Negative for visual disturbance.   Respiratory: Negative.  Negative for shortness of breath and wheezing.    Cardiovascular: Negative.  Negative for chest pain and palpitations.   Gastrointestinal: Negative.    Endocrine: Negative for cold intolerance and heat intolerance.   Genitourinary: Negative.    Musculoskeletal: Negative for myalgias.   Neurological: Negative for numbness.     Vitals:    12/20/22 1457   BP: 120/70   Pulse: 78   Temp: 98.2 °F (36.8 °C)   TempSrc: Temporal   SpO2: 97%   Weight: 67.4 kg (148 lb 9.6 oz)   Height: 182.9 cm (72.01\")      Objective   Physical Exam  Constitutional:       General: He is not in acute distress.     Appearance: Normal appearance. He is not ill-appearing, toxic-appearing or diaphoretic.   Eyes:      General: No scleral icterus.        Right eye: No discharge.         Left eye: No discharge.      Extraocular Movements: Extraocular movements intact.      Conjunctiva/sclera: Conjunctivae normal.   Neck:      Vascular: No carotid bruit.   Cardiovascular:      Rate and Rhythm: Normal rate and regular rhythm.      Pulses: Normal pulses.      Heart sounds: Normal heart sounds.   Pulmonary:      Effort: Pulmonary effort is normal. No respiratory distress.      Breath sounds: Normal breath sounds. No stridor. No rales.   Chest:      Chest wall: No tenderness.   Abdominal:      General: Bowel sounds are normal.      Palpations: Abdomen is soft.      Tenderness: There is no right CVA tenderness or left CVA tenderness.   Musculoskeletal:      Right lower leg: No edema.      Left lower leg: No edema.   Lymphadenopathy:      Cervical: No cervical adenopathy.   Skin:     General: Skin is warm and dry.   Neurological:      Mental Status: He is alert and oriented to person, place, and time.      Comments: Intact light touch on " lower ext   Psychiatric:         Mood and Affect: Mood normal.         Behavior: Behavior normal.       Results Encounter on 12/06/2022   Component Date Value Ref Range Status   • Hemoglobin A1C 12/09/2022 13.10 (H)  4.80 - 5.60 % Final   • Glucose 12/09/2022 351 (H)  65 - 99 mg/dL Final   • BUN 12/09/2022 16  6 - 20 mg/dL Final   • Creatinine 12/09/2022 1.11  0.76 - 1.27 mg/dL Final   • Sodium 12/09/2022 137  136 - 145 mmol/L Final   • Potassium 12/09/2022 4.2  3.5 - 5.2 mmol/L Final   • Chloride 12/09/2022 101  98 - 107 mmol/L Final   • CO2 12/09/2022 25.9  22.0 - 29.0 mmol/L Final   • Calcium 12/09/2022 9.3  8.6 - 10.5 mg/dL Final   • Total Protein 12/09/2022 7.0  6.0 - 8.5 g/dL Final   • Albumin 12/09/2022 4.50  3.50 - 5.20 g/dL Final   • ALT (SGPT) 12/09/2022 19  1 - 41 U/L Final   • AST (SGOT) 12/09/2022 16  1 - 40 U/L Final   • Alkaline Phosphatase 12/09/2022 78  39 - 117 U/L Final   • Total Bilirubin 12/09/2022 0.5  0.0 - 1.2 mg/dL Final   • Globulin 12/09/2022 2.5  gm/dL Final   • A/G Ratio 12/09/2022 1.8  g/dL Final   • BUN/Creatinine Ratio 12/09/2022 14.4  7.0 - 25.0 Final   • Anion Gap 12/09/2022 10.1  5.0 - 15.0 mmol/L Final   • eGFR 12/09/2022 95.1  >60.0 mL/min/1.73 Final    National Kidney Foundation and American Society of Nephrology (ASN) Task Force recommended calculation based on the Chronic Kidney Disease Epidemiology Collaboration (CKD-EPI) equation refit without adjustment for race.   • Microalbumin/Creatinine Ratio 12/09/2022    Final    Unable to calculate   • Creatinine, Urine 12/09/2022 83.6  mg/dL Final   • Microalbumin, Urine 12/09/2022 <1.2  mg/dL Final   • Total Cholesterol 12/09/2022 169  0 - 200 mg/dL Final   • Triglycerides 12/09/2022 122  0 - 150 mg/dL Final   • HDL Cholesterol 12/09/2022 51  40 - 60 mg/dL Final   • LDL Cholesterol  12/09/2022 96  0 - 100 mg/dL Final   • VLDL Cholesterol 12/09/2022 22  5 - 40 mg/dL Final   • LDL/HDL Ratio 12/09/2022 1.84   Final     Assessment &  Plan   Diagnoses and all orders for this visit:    1. Type 1 diabetes mellitus without complication (HCC) (Primary)    2. Type 1 diabetes mellitus with hyperglycemia (HCC)  -     insulin degludec (Tresiba FlexTouch) 100 UNIT/ML solution pen-injector injection; Inject 24 Units under the skin into the appropriate area as directed Daily.  Dispense: 9 mL; Refill: 1    3. Positive MARLENI antibody    4. Other hyperlipidemia    5. Pituitary cyst (HCC)      Increase Tresiba to 24 units every evening.  Continue NovoLog 3 times daily with meals.  Restart Dexcom 6 sensor and sensor download in 2 to 3 weeks.  Continue atorvastatin 20 mg a day.  Follow-up with Dr. Ford as scheduled.    Follow-up with CESIA Lechuga NP in 4 to 6 weeks.    Copy of my note sent to Dr. Albrecht and Dr. Ford.

## 2022-12-21 RX ORDER — CHLORPHENIR/PHENYLEPH/ASPIRIN 2-7.8-325
1 TABLET, EFFERVESCENT ORAL AS NEEDED
Qty: 100 STRIP | Refills: 1 | Status: SHIPPED | OUTPATIENT
Start: 2022-12-21

## 2022-12-21 RX ORDER — INSULIN ASPART 100 [IU]/ML
INJECTION, SOLUTION INTRAVENOUS; SUBCUTANEOUS
Qty: 30 ML | Refills: 1 | Status: SHIPPED | OUTPATIENT
Start: 2022-12-21 | End: 2023-01-31 | Stop reason: SDUPTHER

## 2022-12-21 RX ORDER — PROCHLORPERAZINE 25 MG/1
SUPPOSITORY RECTAL
Qty: 9 EACH | Refills: 3 | Status: SHIPPED | OUTPATIENT
Start: 2022-12-21 | End: 2023-02-09 | Stop reason: SDUPTHER

## 2022-12-22 DIAGNOSIS — E10.65 TYPE 1 DIABETES MELLITUS WITH HYPERGLYCEMIA: ICD-10-CM

## 2022-12-22 RX ORDER — INSULIN ASPART 100 [IU]/ML
INJECTION, SOLUTION INTRAVENOUS; SUBCUTANEOUS
Qty: 20 ML | Refills: 3 | OUTPATIENT
Start: 2022-12-22

## 2022-12-22 RX ORDER — PROCHLORPERAZINE 25 MG/1
1 SUPPOSITORY RECTAL
Qty: 1 EACH | Refills: 3 | Status: SHIPPED | OUTPATIENT
Start: 2022-12-22 | End: 2023-02-09 | Stop reason: SDUPTHER

## 2023-01-12 ENCOUNTER — OFFICE VISIT (OUTPATIENT)
Dept: FAMILY MEDICINE CLINIC | Facility: CLINIC | Age: 23
End: 2023-01-12
Payer: COMMERCIAL

## 2023-01-12 VITALS
RESPIRATION RATE: 16 BRPM | HEIGHT: 72 IN | DIASTOLIC BLOOD PRESSURE: 70 MMHG | SYSTOLIC BLOOD PRESSURE: 120 MMHG | WEIGHT: 162 LBS | BODY MASS INDEX: 21.94 KG/M2

## 2023-01-12 DIAGNOSIS — E10.65 TYPE 1 DIABETES MELLITUS WITH HYPERGLYCEMIA: Primary | ICD-10-CM

## 2023-01-12 DIAGNOSIS — F90.1 ATTENTION DEFICIT HYPERACTIVITY DISORDER (ADHD), PREDOMINANTLY HYPERACTIVE TYPE: ICD-10-CM

## 2023-01-12 PROCEDURE — 99214 OFFICE O/P EST MOD 30 MIN: CPT | Performed by: INTERNAL MEDICINE
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
## 2023-01-31 ENCOUNTER — OFFICE VISIT (OUTPATIENT)
Dept: ENDOCRINOLOGY | Age: 23
End: 2023-01-31
Payer: COMMERCIAL

## 2023-01-31 VITALS
DIASTOLIC BLOOD PRESSURE: 82 MMHG | HEART RATE: 54 BPM | HEIGHT: 72 IN | BODY MASS INDEX: 21.81 KG/M2 | WEIGHT: 161 LBS | OXYGEN SATURATION: 99 % | TEMPERATURE: 97 F | SYSTOLIC BLOOD PRESSURE: 120 MMHG

## 2023-01-31 DIAGNOSIS — E78.49 OTHER HYPERLIPIDEMIA: ICD-10-CM

## 2023-01-31 DIAGNOSIS — E10.65 TYPE 1 DIABETES MELLITUS WITH HYPERGLYCEMIA: Primary | ICD-10-CM

## 2023-01-31 PROCEDURE — 99214 OFFICE O/P EST MOD 30 MIN: CPT | Performed by: NURSE PRACTITIONER

## 2023-01-31 PROCEDURE — 95251 CONT GLUC MNTR ANALYSIS I&R: CPT | Performed by: NURSE PRACTITIONER

## 2023-01-31 RX ORDER — INSULIN ASPART 100 [IU]/ML
INJECTION, SOLUTION INTRAVENOUS; SUBCUTANEOUS
Qty: 21 ML | Refills: 1 | Status: SHIPPED | OUTPATIENT
Start: 2023-01-31 | End: 2023-02-01 | Stop reason: SDUPTHER

## 2023-01-31 NOTE — PATIENT INSTRUCTIONS
Increase Tresiba 26 units daily - try to take on a consistent basis  Set alarms/reminders to take  Increase Novolog 13-21 units before meals

## 2023-01-31 NOTE — PROGRESS NOTES
Chief Complaint  Chief Complaint   Patient presents with   • Diabetes       Subjective          History of Present Illness    Raquel Sparks II 22 y.o.  presents for a follow-up evaluation for type 1 DM     He has been diabetic since October 2021 and started insulin in November 2021     Pt is on the following medications for their DM: Tresiba 22 units daily and Novolog 10-18 units before meals    Missing Tresiba 3 days out of the week       Omnipod was stopped due to high BGs and he was started back on manual injections      Denies chest pain, shortness of breath, vision changes or numbness and tingling in feet/hands.    Weight gain of 13 lbs since last visit.    Pt does not have a history of DM retinopathy.  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     Pt does not have a history of nephropathy.  Patient is not currently taking ACE/ARB     Pt does not have neuropathy.     Pt does not have a history of CAD or CVA.    Last A1C in 12/22 was 13.10    Last microalbumin in 12/22 was negative          Blood Sugars    Blood glucoses are checked via dexcom    Fasting blood glucoses: 200s- 300s    Pre-meal blood glucoses: 60s - 400s    Pt has rare episodes of hypoglycemia due to stacking insulin        Sensor Data    Time in range 16%  High 18%  Very high 65%  Low <1%  Very low <1%      Average Glucose - 284 mg/dL      Sensor Wear - 93 %        Pt gets up and eats breakfast around 9:30 am.  Lunch is between 11 am and 12 pm.  Dinner is between 5 and 6:30 pm.  He has a snack between 1 am and 2 am.        Hyperlipidemia     Pt denies any muscle/body aches, chest pain, or shortness of breath    Pt is currently taking atorvastatin 10 mg HS    Last lipid panel in 12/22 showed Total 169, Triglycerides 122, HDL 51 and LDL 96          Other History    MRI of the pituitary done in November 2021 showed a localized widening of the left side of the sella turcica containing his CSF  "consistent with small arachnoid cyst or partially empty sella.  Lab studies done in November 2021 are as follows: Normal prolactin at 14.30.  Normal TSH of 0.966.  Normal free T4 of 1.36 ng per DL.  Normal free T3 at 2.43 pg/mL.  He was seen by Dr. Ford and has a follow-up with him.      He was hospitalized at [facility] for mental breakdown.  He stopped taking Risperdal on his own shortly after discharge.             I have reviewed the patient's allergies, medicines, past medical hx, family hx and social hx.    Objective   Vital Signs:   /82   Pulse 54   Temp 97 °F (36.1 °C)   Ht 182.9 cm (72.01\")   Wt 73 kg (161 lb)   SpO2 99%   BMI 21.83 kg/m²       Physical Exam   Physical Exam  Constitutional:       General: He is not in acute distress.     Appearance: Normal appearance. He is not diaphoretic.   HENT:      Head: Normocephalic and atraumatic.   Eyes:      General:         Right eye: No discharge.         Left eye: No discharge.   Skin:     General: Skin is warm and dry.   Neurological:      Mental Status: He is alert.   Psychiatric:         Mood and Affect: Mood normal.         Behavior: Behavior normal.                    Results Review:   Hemoglobin A1C   Date Value Ref Range Status   12/09/2022 13.10 (H) 4.80 - 5.60 % Final     Total Cholesterol   Date Value Ref Range Status   12/09/2022 169 0 - 200 mg/dL Final     Triglycerides   Date Value Ref Range Status   12/09/2022 122 0 - 150 mg/dL Final     HDL Cholesterol   Date Value Ref Range Status   12/09/2022 51 40 - 60 mg/dL Final     LDL Cholesterol    Date Value Ref Range Status   12/09/2022 96 0 - 100 mg/dL Final     LDL Chol Calc (NIH)   Date Value Ref Range Status   07/07/2022 84 0 - 99 mg/dL Final     VLDL Cholesterol   Date Value Ref Range Status   12/09/2022 22 5 - 40 mg/dL Final     VLDL Cholesterol Jeffrey   Date Value Ref Range Status   07/07/2022 11 5 - 40 mg/dL Final     LDL/HDL Ratio   Date Value Ref Range Status   12/09/2022 " 1.84  Final         Assessment and Plan {CC Problem List  Visit Diagnosis  ROS  Review (Popup)  Health Maintenance  Quality  BestPractice  Medications  SmartSets  SnapShot Encounters  Media :23  Diagnoses and all orders for this visit:    1. Type 1 diabetes mellitus with hyperglycemia (HCC) (Primary)  -     insulin aspart (NovoLOG FlexPen) 100 UNIT/ML solution pen-injector sc pen; 13-21 units three times daily before meals  Dispense: 21 mL; Refill: 1    Increase Tresiba 26 units daily - try to take on a consistent basis  Set alarms/reminders to take  Increase Novolog 13-21 units before meals  Work on not stacking insulin to prevent lows  Continue with Dexcom        2. Other hyperlipidemia    Continue with statin        Refills sent to pharmacy      RTC in 6 weeks with me and 6 months with Dr. Pike      Follow Up     Patient was given instructions and counseling regarding her condition or for health maintenance advice. Please see specific information pulled into the AVS if appropriate.              Shelbi Lechuga, APRN  01/31/23

## 2023-02-01 DIAGNOSIS — E10.65 TYPE 1 DIABETES MELLITUS WITH HYPERGLYCEMIA: ICD-10-CM

## 2023-02-01 RX ORDER — INSULIN ASPART 100 [IU]/ML
INJECTION, SOLUTION INTRAVENOUS; SUBCUTANEOUS
Qty: 21 ML | Refills: 1 | Status: SHIPPED | OUTPATIENT
Start: 2023-02-01 | End: 2023-02-02 | Stop reason: SDUPTHER

## 2023-02-02 DIAGNOSIS — E10.65 TYPE 1 DIABETES MELLITUS WITH HYPERGLYCEMIA: ICD-10-CM

## 2023-02-02 RX ORDER — INSULIN DEGLUDEC INJECTION 100 U/ML
26 INJECTION, SOLUTION SUBCUTANEOUS DAILY
Qty: 9 ML | Refills: 1 | Status: SHIPPED | OUTPATIENT
Start: 2023-02-02 | End: 2023-02-09 | Stop reason: SDUPTHER

## 2023-02-02 RX ORDER — INSULIN ASPART 100 [IU]/ML
INJECTION, SOLUTION INTRAVENOUS; SUBCUTANEOUS
Qty: 21 ML | Refills: 1 | Status: SHIPPED | OUTPATIENT
Start: 2023-02-02 | End: 2023-02-09 | Stop reason: SDUPTHER

## 2023-02-09 DIAGNOSIS — E10.65 TYPE 1 DIABETES MELLITUS WITH HYPERGLYCEMIA: ICD-10-CM

## 2023-02-09 RX ORDER — PROCHLORPERAZINE 25 MG/1
1 SUPPOSITORY RECTAL
Qty: 1 EACH | Refills: 3 | Status: SHIPPED | OUTPATIENT
Start: 2023-02-09 | End: 2023-02-18 | Stop reason: SDUPTHER

## 2023-02-09 RX ORDER — INSULIN ASPART 100 [IU]/ML
INJECTION, SOLUTION INTRAVENOUS; SUBCUTANEOUS
Qty: 21 ML | Refills: 1 | Status: SHIPPED | OUTPATIENT
Start: 2023-02-09 | End: 2023-03-16 | Stop reason: SDUPTHER

## 2023-02-09 RX ORDER — PROCHLORPERAZINE 25 MG/1
SUPPOSITORY RECTAL
Qty: 9 EACH | Refills: 3 | Status: SHIPPED | OUTPATIENT
Start: 2023-02-09 | End: 2023-02-18 | Stop reason: SDUPTHER

## 2023-02-09 RX ORDER — INSULIN DEGLUDEC INJECTION 100 U/ML
26 INJECTION, SOLUTION SUBCUTANEOUS DAILY
Qty: 9 ML | Refills: 1 | Status: SHIPPED | OUTPATIENT
Start: 2023-02-09

## 2023-02-09 NOTE — TELEPHONE ENCOUNTER
Last: 1/31/23 Ankush  Next:3/14/23 Ankush      ----- Message from Raquel Sparks II sent at 2/9/2023 12:02 PM EST -----  Regarding: Dexcom Sensor & Transmitter  Contact: [phone]  [employer]...  [facility] received the authorization. But didn't receive a prescription. They faxed over a prescription request today to get a 90 day supply through mail order.

## 2023-02-18 DIAGNOSIS — E10.65 TYPE 1 DIABETES MELLITUS WITH HYPERGLYCEMIA: Primary | ICD-10-CM

## 2023-02-18 RX ORDER — PROCHLORPERAZINE 25 MG/1
1 SUPPOSITORY RECTAL DAILY
Qty: 1 EACH | Refills: 1 | Status: SHIPPED | OUTPATIENT
Start: 2023-02-18

## 2023-02-18 RX ORDER — PROCHLORPERAZINE 25 MG/1
SUPPOSITORY RECTAL
Qty: 9 EACH | Refills: 3 | Status: SHIPPED | OUTPATIENT
Start: 2023-02-18

## 2023-02-18 RX ORDER — PROCHLORPERAZINE 25 MG/1
1 SUPPOSITORY RECTAL
Qty: 1 EACH | Refills: 3 | Status: SHIPPED | OUTPATIENT
Start: 2023-02-18

## 2023-03-16 DIAGNOSIS — E10.65 TYPE 1 DIABETES MELLITUS WITH HYPERGLYCEMIA: ICD-10-CM

## 2023-03-16 RX ORDER — INSULIN ASPART 100 [IU]/ML
INJECTION, SOLUTION INTRAVENOUS; SUBCUTANEOUS
Qty: 21 ML | Refills: 1 | Status: SHIPPED | OUTPATIENT
Start: 2023-03-16

## 2023-03-16 NOTE — TELEPHONE ENCOUNTER
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        Rx Refill Note  Requested Prescriptions     Pending Prescriptions Disp Refills   • insulin aspart (NovoLOG FlexPen) 100 UNIT/ML solution pen-injector sc pen 21 mL 1     Si-21 units three times daily before meals      Last office visit with prescribing clinician: 2023   Last telemedicine visit with prescribing clinician: 2023   Next office visit with prescribing clinician: Visit date not found                         Would you like a call back once the refill request has been completed: [] Yes [] No    If the office needs to give you a call back, can they leave a voicemail: [] Yes [] No    Kalpana Renner MA  23, 12:46 EDT

## 2023-05-10 DIAGNOSIS — E10.65 TYPE 1 DIABETES MELLITUS WITH HYPERGLYCEMIA: ICD-10-CM

## 2023-05-12 RX ORDER — INSULIN DEGLUDEC INJECTION 100 U/ML
26 INJECTION, SOLUTION SUBCUTANEOUS DAILY
Qty: 23.4 ML | Refills: 0 | Status: SHIPPED | OUTPATIENT
Start: 2023-05-12 | End: 2023-08-10

## 2023-05-12 NOTE — TELEPHONE ENCOUNTER
Rx Refill Note  Requested Prescriptions     Signed Prescriptions Disp Refills   • insulin degludec (Tresiba FlexTouch) 100 UNIT/ML solution pen-injector injection 23.4 mL 0     Sig: Inject 26 Units under the skin into the appropriate area as directed Daily for 90 days.     Authorizing Provider: NEPTALI JAMES     Ordering User: PALLAVI DEAN      Last office visit with prescribing clinician: 1/31/2023   Last telemedicine visit with prescribing clinician: 3/16/2023   Next office visit with prescribing clinician: Visit date not found                         Would you like a call back once the refill request has been completed: [] Yes [] No    If the office needs to give you a call back, can they leave a voicemail: [] Yes [] No    Pallavi Dean MA  05/12/23, 14:33 EDT

## 2023-07-26 DIAGNOSIS — E10.65 TYPE 1 DIABETES MELLITUS WITH HYPERGLYCEMIA: Primary | ICD-10-CM

## 2023-07-26 RX ORDER — ACYCLOVIR 400 MG/1
1 TABLET ORAL
Qty: 9 EACH | Refills: 3 | Status: SHIPPED | OUTPATIENT
Start: 2023-07-26

## 2023-09-19 ENCOUNTER — OFFICE VISIT (OUTPATIENT)
Dept: ENDOCRINOLOGY | Age: 23
End: 2023-09-19
Payer: COMMERCIAL

## 2023-09-19 VITALS
SYSTOLIC BLOOD PRESSURE: 120 MMHG | HEART RATE: 82 BPM | OXYGEN SATURATION: 98 % | WEIGHT: 167.6 LBS | TEMPERATURE: 96.8 F | DIASTOLIC BLOOD PRESSURE: 70 MMHG | HEIGHT: 72 IN | BODY MASS INDEX: 22.7 KG/M2

## 2023-09-19 DIAGNOSIS — E10.65 TYPE 1 DIABETES MELLITUS WITH HYPERGLYCEMIA: Primary | ICD-10-CM

## 2023-09-19 DIAGNOSIS — E78.49 OTHER HYPERLIPIDEMIA: ICD-10-CM

## 2023-09-19 PROCEDURE — 99214 OFFICE O/P EST MOD 30 MIN: CPT | Performed by: NURSE PRACTITIONER

## 2023-09-19 RX ORDER — ACYCLOVIR 400 MG/1
1 TABLET ORAL
Qty: 9 EACH | Refills: 3 | Status: SHIPPED | OUTPATIENT
Start: 2023-09-19

## 2023-09-19 RX ORDER — INSULIN ASPART 100 [IU]/ML
INJECTION, SOLUTION INTRAVENOUS; SUBCUTANEOUS
Qty: 21 ML | Refills: 1 | Status: SHIPPED | OUTPATIENT
Start: 2023-09-19

## 2023-09-19 NOTE — PROGRESS NOTES
Chief Complaint  Chief Complaint   Patient presents with    Diabetes     Pump attached       Subjective          History of Present Illness    Raquel Sparks II 22 y.o. presents for a follow-up evaluation for type 1 DM     He has been diabetic since October 2021 and started insulin in November 2021     Pt is on the following medications for their DM: Tresiba 18 units daily and Novolog 10-15 units before meals       Omnipod was stopped due to high BGs and he was started back on manual injections      Denies chest pain, shortness of breath, vision changes or numbness and tingling in feet/hands.    Weight gain of 3 lbs since last visit.    Pt does not have a history of DM retinopathy.  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     Pt does not have a history of nephropathy.  Patient is not currently taking ACE/ARB     Pt does not have neuropathy.     Pt does not have a history of CAD or CVA.    Last A1C in 07/23 was 12.40    Last microalbumin in 12/22 was negative          Blood Sugars    Blood glucoses are checked via Dexcom.- unable to download most recent data; but was able to look at report on his phone     Fasting blood glucoses: low 100 - high 100s    Pre-meal blood glucoses: mid 100s - 200s    Pt has rare episodes of hypoglycemia from taking too much insulin              Hyperlipidemia     Pt is currently taking nothing - stop atorvastatin 10 mg HS on his own    Last lipid panel in 07/23 showed Total 174, HDL 62, LDL 95 and Triglycerides 96            Other History     MRI of the pituitary done in November 2021 showed a localized widening of the left side of the sella turcica containing his CSF consistent with small arachnoid cyst or partially empty sella.  Lab studies done in November 2021 are as follows: Normal prolactin at 14.30.  Normal TSH of 0.966.  Normal free T4 of 1.36 ng per DL.  Normal free T3 at 2.43 pg/mL.    MRI of the pituitary done in February  "2022 showed stable appearance of a cystic structure involving the sella laterally to the left.  This represent an arachnoid cyst or simple cyst     He follows with Dr. Ford.        He was hospitalized at [facility] for mental breakdown.  He stopped taking Risperdal on his own shortly after discharge.            I have reviewed the patient's allergies, medicines, past medical hx, family hx and social hx.    Objective   Vital Signs:   /70   Pulse 82   Temp 96.8 °F (36 °C) (Temporal)   Ht 182.9 cm (72.01\")   Wt 76 kg (167 lb 9.6 oz)   SpO2 98%   BMI 22.72 kg/m²       Physical Exam   Physical Exam  Constitutional:       General: He is not in acute distress.     Appearance: Normal appearance. He is not diaphoretic.   HENT:      Head: Normocephalic and atraumatic.   Eyes:      General:         Right eye: No discharge.         Left eye: No discharge.   Skin:     General: Skin is warm and dry.   Neurological:      Mental Status: He is alert.   Psychiatric:         Mood and Affect: Mood normal.         Behavior: Behavior normal.                  Results Review:   Hemoglobin A1C   Date Value Ref Range Status   07/19/2023 12.40 (H) 4.80 - 5.60 % Final     Comment:     Hemoglobin A1C Ranges:  Increased Risk for Diabetes  5.7% to 6.4%  Diabetes                     >= 6.5%  Diabetic Goal                < 7.0%     12/09/2022 13.10 (H) 4.80 - 5.60 % Final     Total Cholesterol   Date Value Ref Range Status   12/09/2022 169 0 - 200 mg/dL Final     Triglycerides   Date Value Ref Range Status   07/19/2023 96 0 - 150 mg/dL Final   12/09/2022 122 0 - 150 mg/dL Final     HDL Cholesterol   Date Value Ref Range Status   07/19/2023 62 (H) 40 - 60 mg/dL Final   12/09/2022 51 40 - 60 mg/dL Final     LDL Chol Calc (NIH)   Date Value Ref Range Status   07/19/2023 95 0 - 100 mg/dL Final     VLDL Cholesterol Jeffrey   Date Value Ref Range Status   07/19/2023 17 5 - 40 mg/dL Final     LDL/HDL Ratio   Date Value Ref Range Status "   12/09/2022 1.84  Final         Assessment and Plan {CC Problem List  Visit Diagnosis  ROS  Review (Popup)  Health Maintenance  Quality  BestPractice  Medications  SmartSets  SnapShot Encounters  Media :23  Diagnoses and all orders for this visit:    1. Type 1 diabetes mellitus with hyperglycemia (Primary)  -     Continuous Blood Gluc Sensor (Dexcom G7 Sensor) misc; Use 1 each Every 10 (Ten) Days.  Dispense: 9 each; Refill: 3  -     insulin aspart (NovoLOG FlexPen) 100 UNIT/ML solution pen-injector sc pen; 13-21 units three times daily before meals  Dispense: 21 mL; Refill: 1    Continue with Tresiba 18 units daily  Change Novolog 12-17 units before meals  Continue with Dexcom G7      2. Other hyperlipidemia    Continue with dietary modification         Refills sent to pharmacy      RTC in 6-8 weeks with me and on 01/25/24 with Dr. Pike      Follow Up     Patient was given instructions and counseling regarding her condition or for health maintenance advice. Please see specific information pulled into the AVS if appropriate.              Shelbi Lechuga, MONE  09/19/23

## 2023-10-08 DIAGNOSIS — E10.65 TYPE 1 DIABETES MELLITUS WITH HYPERGLYCEMIA: ICD-10-CM

## 2023-10-09 RX ORDER — ACYCLOVIR 400 MG/1
1 TABLET ORAL
Qty: 9 EACH | Refills: 0 | Status: SHIPPED | OUTPATIENT
Start: 2023-10-09

## 2023-10-09 NOTE — TELEPHONE ENCOUNTER
Rx Refill Note  Requested Prescriptions     Pending Prescriptions Disp Refills    Continuous Blood Gluc Sensor (Dexcom G7 Sensor) misc 9 each 3     Sig: Use 1 each Every 10 (Ten) Days.      Last office visit with prescribing clinician: 9/19/2023   Last telemedicine visit with prescribing clinician: Visit date not found   Next office visit with prescribing clinician: 11/14/2023                         Would you like a call back once the refill request has been completed: [] Yes [] No    If the office needs to give you a call back, can they leave a voicemail: [] Yes [] No    Baylee Lopez  10/09/23, 08:14 EDT

## 2023-11-13 DIAGNOSIS — E10.65 TYPE 1 DIABETES MELLITUS WITH HYPERGLYCEMIA: ICD-10-CM

## 2023-11-13 RX ORDER — INSULIN ASPART 100 [IU]/ML
INJECTION, SOLUTION INTRAVENOUS; SUBCUTANEOUS
Qty: 21 ML | Refills: 1 | Status: SHIPPED | OUTPATIENT
Start: 2023-11-13

## 2023-11-13 NOTE — TELEPHONE ENCOUNTER
Incoming Refill Request      Medication requested (name and dose): Novolog  100unit/Ml flexpen    Pharmacy where request should be sent: [facility]    Additional details provided by patient:     Best call back number: [phone]    Does the patient have less than a 3 day supply:  [] Yes  [] No    Arlen Lara Rep  11/13/23, 13:23 EST

## 2023-11-14 ENCOUNTER — OFFICE VISIT (OUTPATIENT)
Dept: ENDOCRINOLOGY | Age: 23
End: 2023-11-14
Payer: COMMERCIAL

## 2023-11-14 VITALS
HEIGHT: 72 IN | WEIGHT: 163.8 LBS | DIASTOLIC BLOOD PRESSURE: 84 MMHG | BODY MASS INDEX: 22.19 KG/M2 | TEMPERATURE: 99 F | HEART RATE: 55 BPM | SYSTOLIC BLOOD PRESSURE: 130 MMHG | OXYGEN SATURATION: 98 %

## 2023-11-14 DIAGNOSIS — E78.49 OTHER HYPERLIPIDEMIA: ICD-10-CM

## 2023-11-14 DIAGNOSIS — E10.65 TYPE 1 DIABETES MELLITUS WITH HYPERGLYCEMIA: Primary | ICD-10-CM

## 2023-11-14 PROCEDURE — 99214 OFFICE O/P EST MOD 30 MIN: CPT | Performed by: NURSE PRACTITIONER

## 2023-11-14 RX ORDER — INSULIN DEGLUDEC INJECTION 100 U/ML
18 INJECTION, SOLUTION SUBCUTANEOUS DAILY
Qty: 9 ML | Refills: 1 | Status: SHIPPED | OUTPATIENT
Start: 2023-11-14

## 2023-11-14 RX ORDER — ACYCLOVIR 400 MG/1
1 TABLET ORAL
Qty: 9 EACH | Refills: 0 | Status: SHIPPED | OUTPATIENT
Start: 2023-11-14

## 2023-11-14 NOTE — PROGRESS NOTES
Chief Complaint  Chief Complaint   Patient presents with    Diabetes     Type 1: Pt dates are wrong on his Dexcom, is up to date on eye exam, no hx of retinopathy or neuropathy.        Subjective          History of Present Illness    Raquel Sparks II 23 y.o.  presents for a follow-up evaluation for type 1 DM     He has been diabetic since October 2021 and started insulin in November 2021     Pt is on the following medications for their DM: Tresiba 16 units daily and Novolog 8-10 units before meals        Omnipod was stopped due to high BGs and he was started back on manual injections      Pt complains of polyuria and polydipsia     Denies vision changes or numbness and tingling in feet/hands.      Pt does not have a history of DM retinopathy.  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     Pt does not have a history of nephropathy.  Patient is not currently taking ACE/ARB     Pt does not have neuropathy.     Pt does not have a history of CAD or CVA.    Last A1C in 07/23 was 12.40    Last microalbumin in 12/22 was negative           Blood Sugars    Blood glucoses are checked via Dexcom.    Fasting blood glucoses: high 100s -around 175    Pre-meal blood glucoses: 200s - 300s    Pt has no episodes of hypoglycemia.            Hyperlipidemia     Pt is currently taking nothing - stop atorvastatin 10 mg HS on his own     Last lipid panel in 07/23 showed Total 174, HDL 62, LDL 95 and Triglycerides 96             Other History     MRI of the pituitary done in November 2021 showed a localized widening of the left side of the sella turcica containing his CSF consistent with small arachnoid cyst or partially empty sella.  Lab studies done in November 2021 are as follows: Normal prolactin at 14.30.  Normal TSH of 0.966.  Normal free T4 of 1.36 ng per DL.  Normal free T3 at 2.43 pg/mL.    MRI of the pituitary done in February 2022 showed stable appearance of a cystic structure  "involving the sella laterally to the left.  This represent an arachnoid cyst or simple cyst      He follows with Dr. Ford.        He was hospitalized at [facility] for mental breakdown.  He stopped taking Risperdal on his own shortly after discharge.            I have reviewed the patient's allergies, medicines, past medical hx, family hx and social hx.    Objective   Vital Signs:   /84   Pulse 55   Temp 99 °F (37.2 °C) (Oral)   Ht 182.9 cm (72.01\")   Wt 74.3 kg (163 lb 12.8 oz)   SpO2 98%   BMI 22.21 kg/m²       Physical Exam   Physical Exam  Constitutional:       General: He is not in acute distress.     Appearance: Normal appearance. He is not diaphoretic.   HENT:      Head: Normocephalic and atraumatic.   Eyes:      General:         Right eye: No discharge.         Left eye: No discharge.   Skin:     General: Skin is warm and dry.   Neurological:      Mental Status: He is alert.   Psychiatric:         Mood and Affect: Mood normal.         Behavior: Behavior normal.                    Results Review:   Hemoglobin A1C   Date Value Ref Range Status   07/19/2023 12.40 (H) 4.80 - 5.60 % Final     Comment:     Hemoglobin A1C Ranges:  Increased Risk for Diabetes  5.7% to 6.4%  Diabetes                     >= 6.5%  Diabetic Goal                < 7.0%     12/09/2022 13.10 (H) 4.80 - 5.60 % Final     Total Cholesterol   Date Value Ref Range Status   12/09/2022 169 0 - 200 mg/dL Final     Triglycerides   Date Value Ref Range Status   07/19/2023 96 0 - 150 mg/dL Final   12/09/2022 122 0 - 150 mg/dL Final     HDL Cholesterol   Date Value Ref Range Status   07/19/2023 62 (H) 40 - 60 mg/dL Final   12/09/2022 51 40 - 60 mg/dL Final     LDL Chol Calc (NI[facility])   Date Value Ref Range Status   07/19/2023 95 0 - 100 mg/dL Final     VLDL Cholesterol Jeffrey   Date Value Ref Range Status   07/19/2023 17 5 - 40 mg/dL Final     LDL/HDL Ratio   Date Value Ref Range Status   12/09/2022 1.84  Final         Assessment and " Plan {CC Problem List  Visit Diagnosis  ROS  Review (Popup)  Health Maintenance  Quality  BestPractice  Medications  SmartSets  SnapShot Encounters  Media :23  Diagnoses and all orders for this visit:    1. Type 1 diabetes mellitus with hyperglycemia (Primary)  -     Continuous Blood Gluc Sensor (Dexcom G7 Sensor) misc; Use 1 each Every 10 (Ten) Days.  Dispense: 9 each; Refill: 0  -     insulin degludec (Tresiba FlexTouch) 100 UNIT/ML solution pen-injector injection; Inject 18 Units under the skin into the appropriate area as directed Daily.  Dispense: 9 mL; Refill: 1  -     Hemoglobin A1c; Future  -     Comprehensive Metabolic Panel; Future    Tresiba 18 units daily  Novolog 11-13 units before meals  Continue with Dexcom - Call Dexcom to fix date  Check labs      2. Other hyperlipidemia  -     Comprehensive Metabolic Panel; Future  -     Lipid Panel; Future     Check labs        Refills sent to pharmacy      Labs this week or next week  RTC 01/25/24 with Dr. Pike       Follow Up     Patient was given instructions and counseling regarding her condition or for health maintenance advice. Please see specific information pulled into the AVS if appropriate.              Shelbi Lechuga, APRN  11/14/23

## 2023-12-12 ENCOUNTER — PRIOR AUTHORIZATION (OUTPATIENT)
Dept: ENDOCRINOLOGY | Age: 23
End: 2023-12-12
Payer: COMMERCIAL

## 2024-01-09 DIAGNOSIS — E10.65 TYPE 1 DIABETES MELLITUS WITH HYPERGLYCEMIA: ICD-10-CM

## 2024-01-09 RX ORDER — INSULIN ASPART 100 [IU]/ML
INJECTION, SOLUTION INTRAVENOUS; SUBCUTANEOUS
Qty: 21 ML | Refills: 1 | Status: SHIPPED | OUTPATIENT
Start: 2024-01-09

## 2024-01-09 NOTE — TELEPHONE ENCOUNTER
Rx Refill Note  Requested Prescriptions     Pending Prescriptions Disp Refills    insulin aspart (NovoLOG FlexPen) 100 UNIT/ML solution pen-injector sc pen 21 mL 1     Si-21 units three times daily before meals      Last office visit with prescribing clinician: 2023   Last telemedicine visit with prescribing clinician: Visit date not found   Next office visit with prescribing clinician: Visit date not found                         Would you like a call back once the refill request has been completed: [] Yes [] No    If the office needs to give you a call back, can they leave a voicemail: [] Yes [] No    Baylee Lopez  24, 14:21 EST

## 2024-01-14 DIAGNOSIS — E10.65 TYPE 1 DIABETES MELLITUS WITH HYPERGLYCEMIA: ICD-10-CM

## 2024-01-15 RX ORDER — INSULIN ASPART 100 [IU]/ML
INJECTION, SOLUTION INTRAVENOUS; SUBCUTANEOUS
Qty: 15 ML | Refills: 1 | OUTPATIENT
Start: 2024-01-15

## 2024-01-15 NOTE — TELEPHONE ENCOUNTER
Rx Refill Note  Requested Prescriptions     Pending Prescriptions Disp Refills    NovoLOG FlexPen 100 UNIT/ML solution pen-injector sc pen [Pharmacy Med Name: NOVOLOG 100 UNIT/ML FLEXPEN] 15 mL 1     Si-21 UNITS THREE TIMES DAILY BEFORE MEALS      Last office visit with prescribing clinician: 2023   Last telemedicine visit with prescribing clinician: Visit date not found   Next office visit with prescribing clinician: Visit date not found                         Would you like a call back once the refill request has been completed: [] Yes [] No    If the office needs to give you a call back, can they leave a voicemail: [] Yes [] No    Baylee Lopez  01/15/24, 07:52 EST

## 2024-01-16 DIAGNOSIS — E10.65 TYPE 1 DIABETES MELLITUS WITH HYPERGLYCEMIA: ICD-10-CM

## 2024-01-16 RX ORDER — INSULIN DEGLUDEC INJECTION 100 U/ML
INJECTION, SOLUTION SUBCUTANEOUS
Qty: 15 ML | Refills: 1 | Status: SHIPPED | OUTPATIENT
Start: 2024-01-16 | End: 2024-01-22 | Stop reason: ALTCHOICE

## 2024-01-16 NOTE — TELEPHONE ENCOUNTER
Rx Refill Note  Requested Prescriptions     Pending Prescriptions Disp Refills    Tresiba FlexTouch 100 UNIT/ML solution pen-injector injection [Pharmacy Med Name: Tresiba FlexTouch 100 UNIT/ML Subcutaneous Solution Pen-injector] 15 mL 3     Sig: INJECT 18 UNITS SUBCUTANEOUSLY  INTO THE APPROPRIATE AREA AS  DIRECTED DAILY      Last office visit with prescribing clinician: 11/14/2023   Last telemedicine visit with prescribing clinician: Visit date not found   Next office visit with prescribing clinician: Visit date not found                         Would you like a call back once the refill request has been completed: [] Yes [] No    If the office needs to give you a call back, can they leave a voicemail: [] Yes [] No    Nani Lopez MA  01/16/24, 08:35 EST

## 2024-01-22 DIAGNOSIS — E10.65 TYPE 1 DIABETES MELLITUS WITH HYPERGLYCEMIA: Primary | ICD-10-CM

## 2024-01-22 RX ORDER — INSULIN GLARGINE 300 U/ML
18 INJECTION, SOLUTION SUBCUTANEOUS DAILY
Qty: 8 ML | Refills: 1 | Status: SHIPPED | OUTPATIENT
Start: 2024-01-22

## 2024-05-21 ENCOUNTER — OFFICE VISIT (OUTPATIENT)
Dept: ENDOCRINOLOGY | Age: 24
End: 2024-05-21

## 2024-05-21 VITALS
DIASTOLIC BLOOD PRESSURE: 82 MMHG | HEART RATE: 59 BPM | BODY MASS INDEX: 21.35 KG/M2 | HEIGHT: 72 IN | SYSTOLIC BLOOD PRESSURE: 120 MMHG | OXYGEN SATURATION: 98 % | TEMPERATURE: 98 F | WEIGHT: 157.6 LBS

## 2024-05-21 DIAGNOSIS — H54.40 BLINDNESS OF RIGHT EYE WITH NORMAL VISION IN CONTRALATERAL EYE: ICD-10-CM

## 2024-05-21 DIAGNOSIS — E10.9 TYPE 1 DIABETES MELLITUS WITHOUT COMPLICATIONS: Primary | ICD-10-CM

## 2024-05-21 DIAGNOSIS — E23.6 PITUITARY CYST: ICD-10-CM

## 2024-05-21 DIAGNOSIS — E78.5 HYPERLIPIDEMIA, UNSPECIFIED HYPERLIPIDEMIA TYPE: ICD-10-CM

## 2024-05-21 DIAGNOSIS — R76.8 POSITIVE GAD ANTIBODY: ICD-10-CM

## 2024-05-21 NOTE — PROGRESS NOTES
Macey Sparks II is a 23 y.o. male.     History of Present Illness        He was diagnosed to have diabetes mellitus in 2021 and was initially treated with Metformin.  He was admitted to the hospital in 2021 and diabetic ketoacidosis and insulin was started.  MARLENI antibody was elevated.  He had difficulty with Omnipod in the past.  His last meal was at 10 AM.     He is on Tresiba 14 units every PM and Novolog 8-13 units TID + sliding scale.  He ran out of  Dexcom 7 sensor in .  -150.  -170.  He denies hypoglycemia.  He has lost 6 pounds since 2023.     His last eye examination was .  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  He denies blurry vision.  He denies numbness, tingling or burning in his hands or feet.  Urine microalbumin was normal in 2023     He has hyperlipidemia and has stopped taking atorvastatin.  He denies myalgia.       MRI of the pituitary done in 2021 showed a localized widening of the left side of the sella turcica containing his CSF consistent with small arachnoid cyst or partially empty sella.  Lab studies done in 2021 are as follows: Normal prolactin at 14.30.  Normal TSH of 0.966.  Normal free T4 of 1.36 ng per DL.  Normal free T3 at 2.43 pg/mL.      MRI of the pituitary done in 2022 showed stable appearance of a cystic structure involving the sella laterally to the left.  This represent an arachnoid cyst or simple cyst.     He denies easy bruising.  He denies muscle weakness.  He denies heat or cold intolerance.  He denies bowel changes.  He denies changes in shoes or ring size.     He has fathered 2 children.  His mother  from a car accident in .  He denies alcohol, tobacco or drug abuse.    The following portions of the patient's history were reviewed and updated as appropriate: allergies, current medications, past family history, past medical history, past  "social history, past surgical history, and problem list.    Review of Systems   Eyes:  Negative for visual disturbance.   Respiratory:  Negative for shortness of breath and wheezing.    Cardiovascular:  Negative for chest pain and palpitations.   Gastrointestinal: Negative.    Endocrine: Negative for cold intolerance and heat intolerance.   Genitourinary: Negative.    Musculoskeletal:  Negative for myalgias.   Neurological:  Negative for weakness and numbness.   Hematological:  Does not bruise/bleed easily.     Vitals:    05/21/24 1228   BP: 120/82   Pulse: 59   Temp: 98 °F (36.7 °C)   TempSrc: Temporal   SpO2: 98%   Weight: 71.5 kg (157 lb 9.6 oz)   Height: 182.9 cm (72.01\")      Objective   Physical Exam  Constitutional:       General: He is not in acute distress.     Appearance: Normal appearance. He is not ill-appearing, toxic-appearing or diaphoretic.   Eyes:      General: No scleral icterus.        Right eye: No discharge.         Left eye: No discharge.   Neck:      Vascular: No carotid bruit.   Cardiovascular:      Rate and Rhythm: Normal rate and regular rhythm.   Pulmonary:      Breath sounds: Normal breath sounds. No rales.   Chest:      Chest wall: No tenderness.   Abdominal:      General: Bowel sounds are normal.      Palpations: Abdomen is soft.      Tenderness: There is no right CVA tenderness or left CVA tenderness.   Musculoskeletal:      Right lower leg: No edema.      Left lower leg: No edema.      Comments: Feet warm.  No cyanosis or pedal edema.  No plantar ulcers.   Lymphadenopathy:      Cervical: No cervical adenopathy.   Neurological:      Mental Status: He is alert and oriented to person, place, and time.      Comments: Intact light touch in lower extremities.       Results Encounter on 12/05/2023   Component Date Value Ref Range Status    Hemoglobin A1C 01/15/2024 10.40 (H)  4.80 - 5.60 % Final    Comment: Hemoglobin A1C Ranges:  Increased Risk for Diabetes  5.7% to 6.4%  Diabetes           "           >= 6.5%  Diabetic Goal                < 7.0%      Glucose 01/15/2024 182 (H)  65 - 99 mg/dL Final    BUN 01/15/2024 17  6 - 20 mg/dL Final    Creatinine 01/15/2024 1.29 (H)  0.76 - 1.27 mg/dL Final    EGFR Result 01/15/2024 78.9  >60.0 mL/min/1.73 Final    Comment: GFR Normal >60  Chronic Kidney Disease <60  Kidney Failure <15      BUN/Creatinine Ratio 01/15/2024 13.2  7.0 - 25.0 Final    Sodium 01/15/2024 139  136 - 145 mmol/L Final    Potassium 01/15/2024 4.7  3.5 - 5.2 mmol/L Final    Chloride 01/15/2024 104  98 - 107 mmol/L Final    Total CO2 01/15/2024 25.2  22.0 - 29.0 mmol/L Final    Calcium 01/15/2024 10.0  8.6 - 10.5 mg/dL Final    Total Protein 01/15/2024 7.0  6.0 - 8.5 g/dL Final    Albumin 01/15/2024 4.7  3.5 - 5.2 g/dL Final    Globulin 01/15/2024 2.3  gm/dL Final    A/G Ratio 01/15/2024 2.0  g/dL Final    Total Bilirubin 01/15/2024 0.3  0.0 - 1.2 mg/dL Final    Alkaline Phosphatase 01/15/2024 94  39 - 117 U/L Final    AST (SGOT) 01/15/2024 12  1 - 40 U/L Final    ALT (SGPT) 01/15/2024 16  1 - 41 U/L Final    Total Cholesterol 01/15/2024 153  0 - 200 mg/dL Final    Comment: Cholesterol Reference Ranges  (U.S. Department of Health and Human Services ATP III  Classifications)  Desirable          <200 mg/dL  Borderline High    200-239 mg/dL  High Risk          >240 mg/dL  Triglyceride Reference Ranges  (U.S. Department of Health and Human Services ATP III  Classifications)  Normal           <150 mg/dL  Borderline High  150-199 mg/dL  High             200-499 mg/dL  Very High        >500 mg/dL  HDL Reference Ranges  (U.S. Department of Health and Human Services ATP III  Classifications)  Low     <40 mg/dl (major risk factor for CHD)  High    >60 mg/dl ('negative' risk factor for CHD)  LDL Reference Ranges  (U.S. Department of Health and Human Services ATP III  Classifications)  Optimal          <100 mg/dL  Near Optimal     100-129 mg/dL  Borderline High  130-159 mg/dL  High             160-189  mg/dL  Very High        >189 mg/dL      Triglycerides 01/15/2024 96  0 - 150 mg/dL Final    HDL Cholesterol 01/15/2024 60  40 - 60 mg/dL Final    VLDL Cholesterol Jeffrey 01/15/2024 18  5 - 40 mg/dL Final    LDL Chol Calc ([facility]) 01/15/2024 75  0 - 100 mg/dL Final    Interpretation 01/15/2024 Note   Final    Supplemental report is available.     Assessment & Plan   Diagnoses and all orders for this visit:    1. Type 1 diabetes mellitus without complications (Primary)  -     Comprehensive Metabolic Panel  -     Hemoglobin A1c    2. 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    3. Positive MARLENI antibody    4. Hyperlipidemia, unspecified hyperlipidemia type    5. Pituitary cyst      Continue Tresiba and NovoLog.  Advised to have an eye exam in the near future and yearly thereafter.    Continue no concentrated sweet, low-fat diet.    Follow-up with Dr. Ford as scheduled.    Patient advised to check if he qualifies for Medicaid.    Copy of my note sent to Dr. Albrecht.    RTC 4 mos with CESIA Lechuga NP

## 2024-05-22 LAB
ALBUMIN SERPL-MCNC: 4.3 G/DL (ref 3.5–5.2)
ALBUMIN/GLOB SERPL: 1.3 G/DL
ALP SERPL-CCNC: 93 U/L (ref 39–117)
ALT SERPL-CCNC: 17 U/L (ref 1–41)
AST SERPL-CCNC: 16 U/L (ref 1–40)
BILIRUB SERPL-MCNC: 0.3 MG/DL (ref 0–1.2)
BUN SERPL-MCNC: 12 MG/DL (ref 6–20)
BUN/CREAT SERPL: 12.1 (ref 7–25)
CALCIUM SERPL-MCNC: 9.7 MG/DL (ref 8.6–10.5)
CHLORIDE SERPL-SCNC: 100 MMOL/L (ref 98–107)
CO2 SERPL-SCNC: 26.9 MMOL/L (ref 22–29)
CREAT SERPL-MCNC: 0.99 MG/DL (ref 0.76–1.27)
EGFRCR SERPLBLD CKD-EPI 2021: 108.4 ML/MIN/1.73
GLOBULIN SER CALC-MCNC: 3.2 GM/DL
GLUCOSE SERPL-MCNC: 279 MG/DL (ref 65–99)
HBA1C MFR BLD: 10.2 % (ref 4.8–5.6)
POTASSIUM SERPL-SCNC: 4.4 MMOL/L (ref 3.5–5.2)
PROT SERPL-MCNC: 7.5 G/DL (ref 6–8.5)
SODIUM SERPL-SCNC: 137 MMOL/L (ref 136–145)

## 2024-05-27 NOTE — PROGRESS NOTES
Hemoglobin A1c 10.2%.  Diabetes is poorly controlled.  Please ask patient to check blood sugar ACHS and call with results in 1 week.

## 2024-07-24 ENCOUNTER — OFFICE VISIT (OUTPATIENT)
Dept: FAMILY MEDICINE CLINIC | Facility: CLINIC | Age: 24
End: 2024-07-24

## 2024-07-24 VITALS
HEART RATE: 65 BPM | BODY MASS INDEX: 21.21 KG/M2 | HEIGHT: 72 IN | WEIGHT: 156.6 LBS | SYSTOLIC BLOOD PRESSURE: 118 MMHG | OXYGEN SATURATION: 96 % | DIASTOLIC BLOOD PRESSURE: 78 MMHG

## 2024-07-24 DIAGNOSIS — E10.65 TYPE 1 DIABETES MELLITUS WITH HYPERGLYCEMIA: Primary | ICD-10-CM

## 2024-07-24 PROCEDURE — 99213 OFFICE O/P EST LOW 20 MIN: CPT | Performed by: INTERNAL MEDICINE
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
## 2024-08-16 ENCOUNTER — OFFICE VISIT (OUTPATIENT)
Dept: FAMILY MEDICINE CLINIC | Facility: CLINIC | Age: 24
End: 2024-08-16

## 2024-08-16 VITALS
HEART RATE: 55 BPM | OXYGEN SATURATION: 98 % | SYSTOLIC BLOOD PRESSURE: 90 MMHG | HEIGHT: 72 IN | BODY MASS INDEX: 20.86 KG/M2 | DIASTOLIC BLOOD PRESSURE: 60 MMHG | WEIGHT: 154 LBS

## 2024-08-16 DIAGNOSIS — E78.5 HYPERLIPIDEMIA, UNSPECIFIED HYPERLIPIDEMIA TYPE: ICD-10-CM

## 2024-08-16 DIAGNOSIS — Z00.00 ENCOUNTER FOR PHYSICAL EXAMINATION: Primary | ICD-10-CM
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
## 2024-09-12 DIAGNOSIS — E23.6 PITUITARY CYST: Primary | ICD-10-CM

## 2024-09-20 ENCOUNTER — OFFICE VISIT (OUTPATIENT)
Dept: ENDOCRINOLOGY | Age: 24
End: 2024-09-20
Payer: MEDICAID

## 2024-09-20 ENCOUNTER — SPECIALTY PHARMACY (OUTPATIENT)
Dept: ENDOCRINOLOGY | Age: 24
End: 2024-09-20
Payer: MEDICAID

## 2024-09-20 VITALS
HEIGHT: 72 IN | WEIGHT: 160.2 LBS | HEART RATE: 53 BPM | TEMPERATURE: 97.1 F | BODY MASS INDEX: 21.7 KG/M2 | DIASTOLIC BLOOD PRESSURE: 82 MMHG | OXYGEN SATURATION: 98 % | SYSTOLIC BLOOD PRESSURE: 126 MMHG

## 2024-09-20 DIAGNOSIS — E78.49 OTHER HYPERLIPIDEMIA: ICD-10-CM

## 2024-09-20 DIAGNOSIS — E10.65 TYPE 1 DIABETES MELLITUS WITH HYPERGLYCEMIA: Primary | ICD-10-CM

## 2024-09-20 RX ORDER — INSULIN GLARGINE 300 U/ML
14-45 INJECTION, SOLUTION SUBCUTANEOUS DAILY
Qty: 4.5 ML | Refills: 1 | Status: SHIPPED | OUTPATIENT
Start: 2024-09-20

## 2024-09-20 RX ORDER — INSULIN LISPRO 100 [IU]/ML
8-33 INJECTION, SOLUTION INTRAVENOUS; SUBCUTANEOUS
Qty: 30 ML | Refills: 2 | Status: SHIPPED | OUTPATIENT
Start: 2024-09-20

## 2024-09-20 RX ORDER — ACYCLOVIR 400 MG/1
1 TABLET ORAL
Qty: 9 EACH | Refills: 0 | Status: SHIPPED | OUTPATIENT
Start: 2024-09-20

## 2024-10-17 ENCOUNTER — SPECIALTY PHARMACY (OUTPATIENT)
Dept: ENDOCRINOLOGY | Age: 24
End: 2024-10-17
Payer: MEDICAID

## 2024-10-17 NOTE — PROGRESS NOTES
Specialty Pharmacy Refill Coordination Note     Raquel is a 24 y.o. male contacted today regarding refills of  2 specialty medication(s).    Reviewed and verified with patient:       Specialty medication(s) and dose(s) confirmed: n/a        Delivery Questions      Flowsheet Row Most Recent Value   Delivery method  at Pharmacy  [ at pharmacy 10/17/24 anytime]   Medication(s) being filled and delivered Continuous Glucose Sensor (Dexcom G7 Sensor), Insulin Lispro (HumaLOG KwikPen)   Doses left of specialty medications n/a   Copay verified? Yes   Copay amount $0   Copay form of payment No copayment ($0)   Ship Date n/a   Delivery Date n/a              Medication Adherence    Adherence tools used: alarm  Support network for adherence: family member, healthcare provider          Follow-up: 22 day(s)     Susan Cavazos, Pharmacy Technician  Specialty Pharmacy Technician

## 2024-10-31 ENCOUNTER — TELEPHONE (OUTPATIENT)
Dept: ENDOCRINOLOGY | Age: 24
End: 2024-10-31

## 2024-11-04 ENCOUNTER — TELEPHONE (OUTPATIENT)
Dept: ENDOCRINOLOGY | Age: 24
End: 2024-11-04

## 2024-11-08 ENCOUNTER — SPECIALTY PHARMACY (OUTPATIENT)
Dept: ENDOCRINOLOGY | Age: 24
End: 2024-11-08
Payer: MEDICAID

## 2024-11-08 NOTE — PROGRESS NOTES
Specialty Pharmacy Refill Coordination Note     Raquel is a 24 y.o. male contacted today regarding refills of  2 specialty medication(s).    Reviewed and verified with patient:       Specialty medication(s) and dose(s) confirmed: n/a        Delivery Questions      Flowsheet Row Most Recent Value   Delivery method  at Pharmacy  [ at pharmacy 11/8/24 Anytime]   Medication(s) being filled and delivered Insulin Lispro (HumaLOG KwikPen), Insulin Glargine (Toujeo SoloStar)   Doses left of specialty medications n/a   Copay verified? Yes   Copay amount $70.00   Copay form of payment Pay at pickup   Ship Date n/a   Delivery Date n/a              Medication Adherence    Adherence tools used: alarm  Support network for adherence: family member, healthcare provider          Follow-up: 24 day(s)     Susan Cavazos, Pharmacy Technician  Specialty Pharmacy Technician

## 2024-11-18 ENCOUNTER — TELEPHONE (OUTPATIENT)
Dept: ENDOCRINOLOGY | Age: 24
End: 2024-11-18

## 2024-11-18 NOTE — TELEPHONE ENCOUNTER
Hub staff attempted to follow warm transfer process and was unsuccessful     Caller: DELVIS WITH [employer]     Relationship to patient: PROVIDER    Best call back number: [phone]     Patient is needing:   DELVIS CALLED IN FOR ABDULAZIZ IN REGARDS TO THIS PT. SHE NEEDS INFORMATION ABOUT MEDICAL RECORDS         DELVIS [phone]  EXT: [id number]

## 2024-11-27 NOTE — TELEPHONE ENCOUNTER
Specialty Pharmacy Patient Management Program  Prescription Refill Request     Patient currently fills medications at  Pharmacy. Needing refill(s) on the following:      Requested Prescriptions     Pending Prescriptions Disp Refills    Continuous Glucose Sensor (FreeStyle Manuel 3 Sensor) misc 2 each 2     Sig: Use 1 each Every 14 (Fourteen) Days.       Last visit: 9/20/24  Next visit: 12/16/24    Pended for MONE Carter to review, and approve if appropriate.       Ruma Jordan, PharmD  Clinical Specialty Pharmacist, Endocrinology  11/27/2024  14:54 EST

## 2024-12-02 ENCOUNTER — SPECIALTY PHARMACY (OUTPATIENT)
Dept: ENDOCRINOLOGY | Age: 24
End: 2024-12-02

## 2024-12-02 RX ORDER — ACYCLOVIR 800 MG/1
1 TABLET ORAL
Qty: 2 EACH | Refills: 2 | Status: SHIPPED | OUTPATIENT
Start: 2024-12-02

## 2024-12-02 NOTE — PROGRESS NOTES
Specialty Pharmacy Refill Coordination Note     Raquel is a 24 y.o. male contacted today regarding refills of  1 specialty medication(s).    Reviewed and verified with patient:       Specialty medication(s) and dose(s) confirmed: n/a        Delivery Questions      Flowsheet Row Most Recent Value   Delivery method  at Pharmacy  [ at pharmacy 12/2/24 Anytime]   Medication(s) being filled and delivered Continuous Glucose Sensor (FreeStyle Manuel 3 Sensor)   Doses left of specialty medications n/a   Copay verified? Yes   Copay amount $74.99   Copay form of payment Pay at pickup   Ship Date N/A   Delivery Date N/A              Medication Adherence    Adherence tools used: alarm  Support network for adherence: family member, healthcare provider          Follow-up: 21 day(s)     uSsan Cavazos, Pharmacy Technician  Specialty Pharmacy Technician

## 2024-12-10 ENCOUNTER — SPECIALTY PHARMACY (OUTPATIENT)
Dept: ENDOCRINOLOGY | Age: 24
End: 2024-12-10

## 2024-12-10 NOTE — PROGRESS NOTES
Specialty Pharmacy Refill Coordination Note     Dexcom G7 sensors Prior Authorization pending as of 12/10/24, Key:  [id number]. Patient has new insurance. Prior authorization approved from 12/10/24-12/9/25.      Susan Cavazos, Pharmacy Technician  Specialty Pharmacy Technician

## 2024-12-10 NOTE — PROGRESS NOTES
Specialty Pharmacy Refill Coordination Note     Raquel is a 24 y.o. male contacted today regarding refills of  2 specialty medication(s).    Reviewed and verified with patient:       Specialty medication(s) and dose(s) confirmed: n/a        Delivery Questions      Flowsheet Row Most Recent Value   Delivery method  at Pharmacy  [ at pharmacy 12/11/24 after 11am]   Medication(s) being filled and delivered Dextrose (Diabetic Use) (DEX4)   Doses left of specialty medications n/a   Copay verified? Yes   Copay amount $0   Copay form of payment No copayment ($0)   Ship Date N/A   Delivery Date N/A              Medication Adherence    Adherence tools used: alarm  Support network for adherence: family member, healthcare provider          Follow-up: 23 day(s)     Susan Cavazos, Pharmacy Technician  Specialty Pharmacy Technician

## 2024-12-16 ENCOUNTER — TELEPHONE (OUTPATIENT)
Dept: ENDOCRINOLOGY | Age: 24
End: 2024-12-16

## 2024-12-16 NOTE — TELEPHONE ENCOUNTER
Left pt a message that Shelbi is going to be out of office today and we are needing to reschedule appt. Advise to return call to reschedule.

## 2024-12-21 DIAGNOSIS — E10.65 TYPE 1 DIABETES MELLITUS WITH HYPERGLYCEMIA: ICD-10-CM

## 2024-12-23 RX ORDER — ACYCLOVIR 400 MG/1
1 TABLET ORAL
Qty: 9 EACH | Refills: 0 | Status: SHIPPED | OUTPATIENT
Start: 2024-12-23

## 2024-12-23 NOTE — TELEPHONE ENCOUNTER
Rx Refill Note  Requested Prescriptions     Pending Prescriptions Disp Refills    Continuous Glucose Sensor (Dexcom G7 Sensor) misc 9 each 0     Sig: Use 1 Sensor Every 10 (Ten) Days.      Last office visit with prescribing clinician: 9/20/2024   Last telemedicine visit with prescribing clinician: Visit date not found   Next office visit with prescribing clinician: 12/27/2024                         Would you like a call back once the refill request has been completed: [] Yes [] No    If the office needs to give you a call back, can they leave a voicemail: [] Yes [] No    Baylee Lopez  12/23/24, 08:08 EST

## 2024-12-27 ENCOUNTER — SPECIALTY PHARMACY (OUTPATIENT)
Dept: ENDOCRINOLOGY | Age: 24
End: 2024-12-27
Payer: COMMERCIAL

## 2024-12-27 ENCOUNTER — OFFICE VISIT (OUTPATIENT)
Dept: ENDOCRINOLOGY | Age: 24
End: 2024-12-27
Payer: COMMERCIAL

## 2024-12-27 VITALS
SYSTOLIC BLOOD PRESSURE: 122 MMHG | BODY MASS INDEX: 23.49 KG/M2 | HEIGHT: 72 IN | OXYGEN SATURATION: 98 % | DIASTOLIC BLOOD PRESSURE: 76 MMHG | TEMPERATURE: 97.8 F | WEIGHT: 173.4 LBS | HEART RATE: 61 BPM

## 2024-12-27 DIAGNOSIS — E10.65 TYPE 1 DIABETES MELLITUS WITH HYPERGLYCEMIA: Primary | ICD-10-CM

## 2024-12-27 DIAGNOSIS — E78.49 OTHER HYPERLIPIDEMIA: ICD-10-CM

## 2024-12-27 NOTE — PATIENT INSTRUCTIONS
Increase Toujeo 17 units daily   Continue with Humalog 8-10 units before meals self adjusting for carb content  Try not to over correct low blood sugars
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
## 2024-12-30 ENCOUNTER — PRIOR AUTHORIZATION (OUTPATIENT)
Dept: ENDOCRINOLOGY | Age: 24
End: 2024-12-30
Payer: COMMERCIAL

## 2024-12-30 DIAGNOSIS — E10.65 TYPE 1 DIABETES MELLITUS WITH HYPERGLYCEMIA: Primary | ICD-10-CM

## 2024-12-30 LAB
ALBUMIN SERPL-MCNC: NORMAL G/DL
ALBUMIN/CREAT UR: <2 MG/G CREAT (ref 0–29)
ALP SERPL-CCNC: NORMAL U/L
ALT SERPL-CCNC: NORMAL U/L
AST SERPL-CCNC: NORMAL U/L
BILIRUB SERPL-MCNC: NORMAL MG/DL
BUN SERPL-MCNC: NORMAL MG/DL
CALCIUM SERPL-MCNC: NORMAL MG/DL
CHLORIDE SERPL-SCNC: NORMAL MMOL/L
CHOLEST SERPL-MCNC: 187 MG/DL (ref 0–200)
CO2 SERPL-SCNC: NORMAL MMOL/L
CREAT SERPL-MCNC: NORMAL MG/DL
CREAT UR-MCNC: 166.3 MG/DL
GLUCOSE SERPL-MCNC: NORMAL MG/DL
HBA1C MFR BLD: 9.1 % (ref 4.8–5.6)
HDLC SERPL-MCNC: 53 MG/DL (ref 40–60)
IMP & REVIEW OF LAB RESULTS: NORMAL
LDLC SERPL CALC-MCNC: 120 MG/DL (ref 0–100)
MICROALBUMIN UR-MCNC: <3 UG/ML
POTASSIUM SERPL-SCNC: NORMAL MMOL/L
PROT SERPL-MCNC: NORMAL G/DL
REQUEST PROBLEM: NORMAL
SODIUM SERPL-SCNC: NORMAL MMOL/L
TRIGL SERPL-MCNC: 77 MG/DL (ref 0–150)
VLDLC SERPL CALC-MCNC: 14 MG/DL (ref 5–40)

## 2025-01-02 ENCOUNTER — SPECIALTY PHARMACY (OUTPATIENT)
Dept: ENDOCRINOLOGY | Age: 25
End: 2025-01-02
Payer: COMMERCIAL

## 2025-01-02 NOTE — PROGRESS NOTES
Specialty Pharmacy       Altalee Sparks II is a 24 y.o. male seen by an Endocrinology provider for Type 2 Diabetes.    Benefits Investigation Summary    Prescription: long-acting insulin    Dispensing pharmacy: [facility]    PLAN: [place] Medicaid  BIN: [id number]  PCN: [id number]  RX GROUP: [id number]    Prior Auth and Med Assistance notes: Initial PA for toujeo denied for continuation of therapy. An appeal was submitted on CMM: Key: [id number], but the appeal decision was upheld.   Provided covered alternative to medical assistant to be sent to the pharmacy. Lantus prescription sent.    Ruma Jordan, PharmD  Clinical Specialty Pharmacist, Endocrinology  1/2/2025  08:19 EST

## 2025-01-03 ENCOUNTER — SPECIALTY PHARMACY (OUTPATIENT)
Dept: ENDOCRINOLOGY | Age: 25
End: 2025-01-03
Payer: COMMERCIAL

## 2025-01-03 NOTE — PROGRESS NOTES
Specialty Pharmacy Refill Coordination Note     Raquel is a 24 y.o. male contacted today regarding refills of  2 specialty medication(s).    Reviewed and verified with patient:       Specialty medication(s) and dose(s) confirmed: n/a        Delivery Questions      Flowsheet Row Most Recent Value   Delivery method [employer]   Delivery address verified with patient/caregiver? Yes   Delivery address Home   Medication(s) being filled and delivered Insulin Glargine (LANTUS SOLOSTAR), Continuous Glucose Sensor (Dexcom G7 Sensor)   Doses left of specialty medications N/A   Copay verified? Yes   Copay amount $0   Copay form of payment No copayment ($0)   Ship Date N/A   Delivery Date N/A              Medication Adherence    Adherence tools used: alarm  Support network for adherence: family member, healthcare provider          Follow-up: 81 day(s)     Susan Cavazos, Pharmacy Technician  Specialty Pharmacy Technician

## 2025-01-22 DIAGNOSIS — E10.65 TYPE 1 DIABETES MELLITUS WITH HYPERGLYCEMIA: ICD-10-CM

## 2025-01-22 NOTE — TELEPHONE ENCOUNTER
Specialty Pharmacy Patient Management Program  Prescription Refill Request     Patient currently fills medications at  Pharmacy. Needing refill(s) on the following:      Requested Prescriptions     Pending Prescriptions Disp Refills    Continuous Glucose Sensor (Dexcom G7 Sensor) misc 9 each 0     Sig: Use 1 Sensor Every 10 (Ten) Days.    HumaLOG KwikPen 100 UNIT/ML solution pen-injector 30 mL 2     Sig: Inject 8-33 Units under the skin into the appropriate area as directed 3 (Three) Times a Day Before Meals.    Insulin Glargine (LANTUS SOLOSTAR) 100 UNIT/ML injection pen 15 mL 0     Sig: Inject 17 Units under the skin into the appropriate area as directed Daily.       Last visit: 12/27/24 - MONE Carter  Next visit: 03/21/25 - Dr. Pike     Pended for Dr. Pike to review, and approve if appropriate.     Radha Cosby, PharmD, BCACP, BC-ADM, [facility]  Clinical Specialty Pharmacist, Endocrinology  1/22/2025  14:41 EST

## 2025-01-23 RX ORDER — INSULIN LISPRO 100 [IU]/ML
8-10 INJECTION, SOLUTION INTRAVENOUS; SUBCUTANEOUS
Qty: 30 ML | Refills: 0 | Status: SHIPPED | OUTPATIENT
Start: 2025-01-23

## 2025-01-23 RX ORDER — ACYCLOVIR 400 MG/1
1 TABLET ORAL
Qty: 9 EACH | Refills: 1 | Status: SHIPPED | OUTPATIENT
Start: 2025-01-23

## 2025-01-27 ENCOUNTER — SPECIALTY PHARMACY (OUTPATIENT)
Dept: ENDOCRINOLOGY | Age: 25
End: 2025-01-27
Payer: COMMERCIAL

## 2025-01-27 NOTE — PROGRESS NOTES
Specialty Pharmacy Refill Coordination Note     Raquel is a 24 y.o. male contacted today regarding refills of  1 specialty medication(s).    Reviewed and verified with patient:       Specialty medication(s) and dose(s) confirmed: n/a        Delivery Questions      Flowsheet Row Most Recent Value   Delivery method  at Pharmacy  [ at pharmacy 1/28/25 anytime]   Medication(s) being filled and delivered Insulin Lispro (HumaLOG KwikPen)   Doses left of specialty medications N/A   Copay verified? Yes   Copay amount $0   Copay form of payment No copayment ($0)   Ship Date N/A              Medication Adherence    Adherence tools used: alarm  Support network for adherence: family member, healthcare provider          Follow-up: 43 day(s)     Susan Cavazos, Pharmacy Technician  Specialty Pharmacy Technician

## 2025-02-06 ENCOUNTER — HOSPITAL ENCOUNTER (EMERGENCY)
Facility: HOSPITAL | Age: 25
Discharge: HOME | End: 2025-02-06
Attending: EMERGENCY MEDICINE
Payer: COMMERCIAL

## 2025-02-06 VITALS
HEART RATE: 88 BPM | RESPIRATION RATE: 20 BRPM | TEMPERATURE: 100.6 F | SYSTOLIC BLOOD PRESSURE: 124 MMHG | DIASTOLIC BLOOD PRESSURE: 76 MMHG | OXYGEN SATURATION: 98 %

## 2025-02-06 DIAGNOSIS — J10.1 INFLUENZA A: Primary | ICD-10-CM

## 2025-02-06 DIAGNOSIS — E10.65 HYPERGLYCEMIA DUE TO TYPE 1 DIABETES MELLITUS: ICD-10-CM

## 2025-02-06 DIAGNOSIS — R55 SYNCOPE, UNSPECIFIED SYNCOPE TYPE: ICD-10-CM

## 2025-02-06 LAB
ALBUMIN SERPL-MCNC: 4 G/DL (ref 3.5–5.2)
ALBUMIN/GLOB SERPL: 1.5 G/DL
ALP SERPL-CCNC: 74 U/L (ref 39–117)
ALT SERPL W P-5'-P-CCNC: 14 U/L (ref 1–41)
ANION GAP SERPL CALCULATED.3IONS-SCNC: 9 MMOL/L (ref 5–15)
AST SERPL-CCNC: 19 U/L (ref 1–40)
B PARAPERT DNA SPEC QL NAA+PROBE: NOT DETECTED
B PERT DNA SPEC QL NAA+PROBE: NOT DETECTED
BASOPHILS # BLD AUTO: 0.02 10*3/MM3 (ref 0–0.2)
BASOPHILS NFR BLD AUTO: 0.3 % (ref 0–1.5)
BILIRUB SERPL-MCNC: 0.3 MG/DL (ref 0–1.2)
BUN SERPL-MCNC: 12 MG/DL (ref 6–20)
BUN/CREAT SERPL: 10.6 (ref 7–25)
C PNEUM DNA NPH QL NAA+NON-PROBE: NOT DETECTED
CALCIUM SPEC-SCNC: 8.2 MG/DL (ref 8.6–10.5)
CHLORIDE SERPL-SCNC: 105 MMOL/L (ref 98–107)
CO2 SERPL-SCNC: 24 MMOL/L (ref 22–29)
CREAT SERPL-MCNC: 1.13 MG/DL (ref 0.76–1.27)
DEPRECATED RDW RBC AUTO: 43.9 FL (ref 37–54)
EGFRCR SERPLBLD CKD-EPI 2021: 91.9 ML/MIN/1.73
EOSINOPHIL # BLD AUTO: 0.16 10*3/MM3 (ref 0–0.4)
EOSINOPHIL NFR BLD AUTO: 2.5 % (ref 0.3–6.2)
ERYTHROCYTE [DISTWIDTH] IN BLOOD BY AUTOMATED COUNT: 13.8 % (ref 12.3–15.4)
FLUAV H3 RNA NPH QL NAA+PROBE: DETECTED
FLUBV RNA ISLT QL NAA+PROBE: NOT DETECTED
GEN 5 1HR TROPONIN T REFLEX: 11 NG/L
GLOBULIN UR ELPH-MCNC: 2.7 GM/DL
GLUCOSE BLDC GLUCOMTR-MCNC: 139 MG/DL (ref 70–130)
GLUCOSE SERPL-MCNC: 271 MG/DL (ref 65–99)
HADV DNA SPEC NAA+PROBE: NOT DETECTED
HCOV 229E RNA SPEC QL NAA+PROBE: NOT DETECTED
HCOV HKU1 RNA SPEC QL NAA+PROBE: NOT DETECTED
HCOV NL63 RNA SPEC QL NAA+PROBE: NOT DETECTED
HCOV OC43 RNA SPEC QL NAA+PROBE: NOT DETECTED
HCT VFR BLD AUTO: 41.9 % (ref 37.5–51)
HGB BLD-MCNC: 14.3 G/DL (ref 13–17.7)
HMPV RNA NPH QL NAA+NON-PROBE: NOT DETECTED
HPIV1 RNA ISLT QL NAA+PROBE: NOT DETECTED
HPIV2 RNA SPEC QL NAA+PROBE: NOT DETECTED
HPIV3 RNA NPH QL NAA+PROBE: NOT DETECTED
HPIV4 P GENE NPH QL NAA+PROBE: NOT DETECTED
IMM GRANULOCYTES # BLD AUTO: 0.02 10*3/MM3 (ref 0–0.05)
IMM GRANULOCYTES NFR BLD AUTO: 0.3 % (ref 0–0.5)
LIPASE SERPL-CCNC: 16 U/L (ref 13–60)
LYMPHOCYTES # BLD AUTO: 0.56 10*3/MM3 (ref 0.7–3.1)
LYMPHOCYTES NFR BLD AUTO: 8.9 % (ref 19.6–45.3)
M PNEUMO IGG SER IA-ACNC: NOT DETECTED
MCH RBC QN AUTO: 30.3 PG (ref 26.6–33)
MCHC RBC AUTO-ENTMCNC: 34.1 G/DL (ref 31.5–35.7)
MCV RBC AUTO: 88.8 FL (ref 79–97)
MONOCYTES # BLD AUTO: 0.52 10*3/MM3 (ref 0.1–0.9)
MONOCYTES NFR BLD AUTO: 8.2 % (ref 5–12)
NEUTROPHILS NFR BLD AUTO: 5.04 10*3/MM3 (ref 1.7–7)
NEUTROPHILS NFR BLD AUTO: 79.8 % (ref 42.7–76)
NRBC BLD AUTO-RTO: 0 /100 WBC (ref 0–0.2)
PLATELET # BLD AUTO: 236 10*3/MM3 (ref 140–450)
PMV BLD AUTO: 10.5 FL (ref 6–12)
POTASSIUM SERPL-SCNC: 3.5 MMOL/L (ref 3.5–5.2)
PROT SERPL-MCNC: 6.7 G/DL (ref 6–8.5)
QT INTERVAL: 342 MS
QTC INTERVAL: 406 MS
RBC # BLD AUTO: 4.72 10*6/MM3 (ref 4.14–5.8)
RHINOVIRUS RNA SPEC NAA+PROBE: NOT DETECTED
RSV RNA NPH QL NAA+NON-PROBE: NOT DETECTED
SARS-COV-2 RNA NPH QL NAA+NON-PROBE: NOT DETECTED
SODIUM SERPL-SCNC: 138 MMOL/L (ref 136–145)
TROPONIN T NUMERIC DELTA: 2 NG/L
TROPONIN T SERPL HS-MCNC: 9 NG/L
WBC NRBC COR # BLD AUTO: 6.32 10*3/MM3 (ref 3.4–10.8)

## 2025-02-06 PROCEDURE — 93005 ELECTROCARDIOGRAM TRACING: CPT | Performed by: PHYSICIAN ASSISTANT

## 2025-02-06 PROCEDURE — 99283 EMERGENCY DEPT VISIT LOW MDM: CPT

## 2025-02-06 PROCEDURE — 25810000003 LACTATED RINGERS SOLUTION: Performed by: PHYSICIAN ASSISTANT

## 2025-02-06 PROCEDURE — 36415 COLL VENOUS BLD VENIPUNCTURE: CPT

## 2025-02-06 PROCEDURE — 96375 TX/PRO/DX INJ NEW DRUG ADDON: CPT

## 2025-02-06 PROCEDURE — 96361 HYDRATE IV INFUSION ADD-ON: CPT

## 2025-02-06 PROCEDURE — 85025 COMPLETE CBC W/AUTO DIFF WBC: CPT | Performed by: PHYSICIAN ASSISTANT

## 2025-02-06 PROCEDURE — 84484 ASSAY OF TROPONIN QUANT: CPT | Performed by: PHYSICIAN ASSISTANT

## 2025-02-06 PROCEDURE — 25010000002 ONDANSETRON PER 1 MG: Performed by: PHYSICIAN ASSISTANT

## 2025-02-06 PROCEDURE — 93010 ELECTROCARDIOGRAM REPORT: CPT | Performed by: INTERNAL MEDICINE

## 2025-02-06 PROCEDURE — 80053 COMPREHEN METABOLIC PANEL: CPT | Performed by: PHYSICIAN ASSISTANT

## 2025-02-06 PROCEDURE — 25010000002 KETOROLAC TROMETHAMINE PER 15 MG: Performed by: PHYSICIAN ASSISTANT

## 2025-02-06 PROCEDURE — 96374 THER/PROPH/DIAG INJ IV PUSH: CPT

## 2025-02-06 PROCEDURE — 83690 ASSAY OF LIPASE: CPT | Performed by: PHYSICIAN ASSISTANT

## 2025-02-06 PROCEDURE — 82948 REAGENT STRIP/BLOOD GLUCOSE: CPT

## 2025-02-06 PROCEDURE — 0202U NFCT DS 22 TRGT SARS-COV-2: CPT | Performed by: PHYSICIAN ASSISTANT

## 2025-02-06 PROCEDURE — 63710000001 INSULIN REGULAR HUMAN PER 5 UNITS: Performed by: PHYSICIAN ASSISTANT

## 2025-02-06 RX ORDER — ONDANSETRON 4 MG/1
4 TABLET, ORALLY DISINTEGRATING ORAL EVERY 8 HOURS PRN
Qty: 20 TABLET | Refills: 0 | Status: SHIPPED | OUTPATIENT
Start: 2025-02-06

## 2025-02-06 RX ORDER — ONDANSETRON 2 MG/ML
4 INJECTION INTRAMUSCULAR; INTRAVENOUS ONCE
Status: COMPLETED | OUTPATIENT
Start: 2025-02-06 | End: 2025-02-06

## 2025-02-06 RX ORDER — DEXTROMETHORPHAN HYDROBROMIDE AND PROMETHAZINE HYDROCHLORIDE 15; 6.25 MG/5ML; MG/5ML
5 SYRUP ORAL 4 TIMES DAILY PRN
Qty: 120 ML | Refills: 0 | Status: SHIPPED | OUTPATIENT
Start: 2025-02-06

## 2025-02-06 RX ORDER — KETOROLAC TROMETHAMINE 30 MG/ML
30 INJECTION, SOLUTION INTRAMUSCULAR; INTRAVENOUS ONCE
Status: COMPLETED | OUTPATIENT
Start: 2025-02-06 | End: 2025-02-06

## 2025-02-06 RX ADMIN — INSULIN HUMAN 5 UNITS: 100 INJECTION, SOLUTION PARENTERAL at 05:09

## 2025-02-06 RX ADMIN — KETOROLAC TROMETHAMINE 30 MG: 30 INJECTION, SOLUTION INTRAMUSCULAR at 04:08

## 2025-02-06 RX ADMIN — SODIUM CHLORIDE, POTASSIUM CHLORIDE, SODIUM LACTATE AND CALCIUM CHLORIDE 1000 ML: 600; 310; 30; 20 INJECTION, SOLUTION INTRAVENOUS at 04:13

## 2025-02-06 RX ADMIN — ONDANSETRON 4 MG: 2 INJECTION, SOLUTION INTRAMUSCULAR; INTRAVENOUS at 04:07
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
## 2025-02-06 NOTE — ED PROVIDER NOTES
I have personally performed a face-to-face diagnostic evaluation of the patient.  I have reviewed and agree with the care plan as outlined by NP/PA.  My findings are as follows:    HPI:  Patient is a 24 y.o. male who presents with fever, cough, nausea and vomiting.  Symptoms started a few days ago.  Tonight, was standing up and had the onset of tunnel vision and some spots in the vision and had a syncopal event.  He was not injured secondary to this.  Denies any abdominal pain, diarrhea or constipation.      PE:  Physical Exam  Constitutional:       Appearance: Normal appearance.   HENT:      Head: Atraumatic.   Eyes:      Conjunctiva/sclera: Conjunctivae normal.   Cardiovascular:      Rate and Rhythm: Normal rate and regular rhythm.      Heart sounds: No murmur heard.  Pulmonary:      Effort: Pulmonary effort is normal. No respiratory distress.      Breath sounds: Normal breath sounds.   Abdominal:      Tenderness: There is no abdominal tenderness. There is no guarding or rebound.   Skin:     Capillary Refill: Capillary refill takes less than 2 seconds.   Neurological:      Mental Status: He is alert and oriented to person, place, and time.           MDM:  I provided a substantiate portion of the care of this patient. I personally performed the medical decision making.    Medical records are reviewed in [facility] and Care Everywhere, if applicable    EKG Interpreted by me: Some artifact limits interpretation in the lateral leads, normal sinus rhythm, rate 85, no ST segment elevation    Recent Results (from the past 24 hours)   Covid-19 + Flu A&B AG, Veritor    Collection Time: 02/05/25  4:52 PM    Specimen: Swab   Result Value Ref Range    SARS Antigen Not Detected Not Detected, Presumptive Negative    Influenza A Antigen JULIETA Not Detected Not Detected    Influenza B Antigen JULIETA Not Detected Not Detected    Internal Control Passed Passed    Lot Number [id number]     Expiration Date 11/21/2025    ECG 12 Lead Syncope     Collection Time: 02/06/25  4:05 AM   Result Value Ref Range    QT Interval 342 ms    QTC Interval 406 ms   Comprehensive Metabolic Panel    Collection Time: 02/06/25  4:06 AM    Specimen: Blood   Result Value Ref Range    Glucose 271 (H) 65 - 99 mg/dL    BUN 12 6 - 20 mg/dL    Creatinine 1.13 0.76 - 1.27 mg/dL    Sodium 138 136 - 145 mmol/L    Potassium 3.5 3.5 - 5.2 mmol/L    Chloride 105 98 - 107 mmol/L    CO2 24.0 22.0 - 29.0 mmol/L    Calcium 8.2 (L) 8.6 - 10.5 mg/dL    Total Protein 6.7 6.0 - 8.5 g/dL    Albumin 4.0 3.5 - 5.2 g/dL    ALT (SGPT) 14 1 - 41 U/L    AST (SGOT) 19 1 - 40 U/L    Alkaline Phosphatase 74 39 - 117 U/L    Total Bilirubin 0.3 0.0 - 1.2 mg/dL    Globulin 2.7 gm/dL    A/G Ratio 1.5 g/dL    BUN/Creatinine Ratio 10.6 7.0 - 25.0    Anion Gap 9.0 5.0 - 15.0 mmol/L    eGFR 91.9 >60.0 mL/min/1.73   Lipase    Collection Time: 02/06/25  4:06 AM    Specimen: Blood   Result Value Ref Range    Lipase 16 13 - 60 U/L   CBC Auto Differential    Collection Time: 02/06/25  4:06 AM    Specimen: Blood   Result Value Ref Range    WBC 6.32 3.40 - 10.80 10*3/mm3    RBC 4.72 4.14 - 5.80 10*6/mm3    Hemoglobin 14.3 13.0 - 17.7 g/dL    Hematocrit 41.9 37.5 - 51.0 %    MCV 88.8 79.0 - 97.0 fL    MCH 30.3 26.6 - 33.0 pg    MCHC 34.1 31.5 - 35.7 g/dL    RDW 13.8 12.3 - 15.4 %    RDW-SD 43.9 37.0 - 54.0 fl    MPV 10.5 6.0 - 12.0 fL    Platelets 236 140 - 450 10*3/mm3    Neutrophil % 79.8 (H) 42.7 - 76.0 %    Lymphocyte % 8.9 (L) 19.6 - 45.3 %    Monocyte % 8.2 5.0 - 12.0 %    Eosinophil % 2.5 0.3 - 6.2 %    Basophil % 0.3 0.0 - 1.5 %    Immature Grans % 0.3 0.0 - 0.5 %    Neutrophils, Absolute 5.04 1.70 - 7.00 10*3/mm3    Lymphocytes, Absolute 0.56 (L) 0.70 - 3.10 10*3/mm3    Monocytes, Absolute 0.52 0.10 - 0.90 10*3/mm3    Eosinophils, Absolute 0.16 0.00 - 0.40 10*3/mm3    Basophils, Absolute 0.02 0.00 - 0.20 10*3/mm3    Immature Grans, Absolute 0.02 0.00 - 0.05 10*3/mm3    nRBC 0.0 0.0 - 0.2 /100 WBC   High  Sensitivity Troponin T    Collection Time: 02/06/25  4:06 AM    Specimen: Blood   Result Value Ref Range    HS Troponin T 9 <22 ng/L   Respiratory Panel PCR w/COVID-19(SARS-CoV-2) [facility]/PAD/COR/NAGA In-House, NP Swab in UTM/VTM, 2 HR TAT - Swab, Nasopharynx    Collection Time: 02/06/25  4:07 AM    Specimen: Nasopharynx; Swab   Result Value Ref Range    ADENOVIRUS, PCR Not Detected Not Detected    Coronavirus 229E Not Detected Not Detected    Coronavirus HKU1 Not Detected Not Detected    Coronavirus NL63 Not Detected Not Detected    Coronavirus OC43 Not Detected Not Detected    COVID19 Not Detected Not Detected - Ref. Range    Human Metapneumovirus Not Detected Not Detected    Human Rhinovirus/Enterovirus Not Detected Not Detected    Influenza A H3 Detected (A) Not Detected    Influenza B PCR Not Detected Not Detected    Parainfluenza Virus 1 Not Detected Not Detected    Parainfluenza Virus 2 Not Detected Not Detected    Parainfluenza Virus 3 Not Detected Not Detected    Parainfluenza Virus 4 Not Detected Not Detected    RSV, PCR Not Detected Not Detected    Bordetella pertussis pcr Not Detected Not Detected    Bordetella parapertussis PCR Not Detected Not Detected    Chlamydophila pneumoniae PCR Not Detected Not Detected    Mycoplasma pneumo by PCR Not Detected Not Detected   High Sensitivity Troponin T 1Hr    Collection Time: 02/06/25  5:12 AM    Specimen: Blood   Result Value Ref Range    HS Troponin T 11 <22 ng/L    Troponin T Numeric Delta 2 Abnormal if >/=3 ng/L   POC Glucose Once    Collection Time: 02/06/25  5:48 AM    Specimen: Blood   Result Value Ref Range    Glucose 139 (H) 70 - 130 mg/dL     I have reviewed the above labs    This is an overall well-appearing 24-year-old male who is presenting with upper respiratory symptoms and nausea/vomiting.  He overall looks well.  He presents with fever to 100.6, but other vital signs are unremarkable.  His cardiopulmonary exam is normal.  He is not having any  appreciable abdominal tenderness to palpation.    he had an EKG here which not demonstrate any cardiogenic cause of syncope like WPW, HOCM, Brugada, A-V dissociation, prolonged QTC.     Patient was evaluated with labs.  He does have a history of type 1 diabetes.  His glucose was 271, but no evidence of DKA/HHS based on labs.  He was treated with fluids and glucose improved to 139.  Other labs are unremarkable for acute metabolic emergent findings.    Viral swab is positive for influenza A.  I suspect this is the underlying etiology of his symptoms.  Out of window for Tamiflu.  Will plan on supportive care at this time and given clinical improvement in the emergency department, we will plan on supportive care and outpatient reassessment    Impression:  Final diagnoses:   Influenza A   Hyperglycemia due to type 1 diabetes mellitus   Syncope, unspecified syncope type         Disposition:  ED Disposition       ED Disposition   Discharge    Condition   Stable    Comment   --                Rebecca Velasquez MD  02/06/25 0616

## 2025-03-13 ENCOUNTER — SPECIALTY PHARMACY (OUTPATIENT)
Dept: ENDOCRINOLOGY | Age: 25
End: 2025-03-13
Payer: COMMERCIAL

## 2025-03-13 NOTE — PROGRESS NOTES
Specialty Pharmacy Patient Management Program  Reassessment     Raquel Sparks II is a 24 y.o. male with type 1 diabetes and enrolled in the Patient Management program offered by [facility]. A follow-up outreach was conducted, including assessment of continued therapy appropriateness, medication adherence, and side effect incidence and management for Dexcom G7, Lantus (Insulin glargine) and Humalog (Insulin lispro).     Changes to Insurance Coverage or Financial Support  none    Relevant Past Medical History and Comorbidities  Relevant medical history and concomitant health conditions were discussed with the patient. The patient's chart has been reviewed for relevant past medical history and comorbid health conditions and updated as necessary.   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    Depression     Diabetes mellitus     Diabetes mellitus type I 11/12/2021    Diabetic ketoacidosis without coma associated with type 1 diabetes mellitus 11/11/2021    Head injury 2000    Hyperlipidemia 2021    Shingles     Subclinical hyperthyroidism     Tension-type headache      Social History     Socioeconomic History    Marital status: Single   Tobacco Use    Smoking status: Never     Passive exposure: Never    Smokeless tobacco: Never   Vaping Use    Vaping status: Never Used   Substance and Sexual Activity    Alcohol use: No    Drug use: Not Currently     Types: Marijuana     Comment: Have recently smoked again    Sexual activity: Yes     Partners: Female     Birth control/protection: Condom, None     Problem list reviewed by Ambar Green RP on 3/13/2025 at  3:28 PM    Allergies  Known allergies and reactions were discussed with the patient. The patient's chart has been reviewed for allergy information and updated as necessary.   Allergies   Allergen Reactions    Strawberry Angioedema    Strawberry  (Diagnostic) Anaphylaxis    Strawberry Extract Hives     Allergies reviewed by Ambar Green RPH on 3/13/2025 at  3:21 PM    Relevant Laboratory Values  Lab Results   Component Value Date    GLUCOSE 271 (H) 02/06/2025    CALCIUM 8.2 (L) 02/06/2025     02/06/2025    K 3.5 02/06/2025    CO2 24.0 02/06/2025     02/06/2025    BUN 12 02/06/2025    CREATININE 1.13 02/06/2025    BCR 10.6 02/06/2025    ANIONGAP 9.0 02/06/2025     Lab Results   Component Value Date    WBC 6.32 02/06/2025    HGB 14.3 02/06/2025    HCT 41.9 02/06/2025    MCV 88.8 02/06/2025     02/06/2025    INR 1.15 (H) 01/06/2021     Lab Value Review  The above lab values have been reviewed; the following specialty medication(s) dose adjustment(s) are recommended: none.    Current Medication List  This medication list has been reviewed with the patient and evaluated for any interactions or necessary modifications/recommendations, and updated to include all prescription medications, OTC medications, and supplements the patient is currently taking. This list reflects what is contained in the patient's profile, which has also been marked as reviewed to communicate to other providers it is the most up to date version of the patient's current medication therapy.     Current Outpatient Medications:     Continuous Glucose Sensor (Dexcom G7 Sensor) misc, Use 1 Sensor Every 10 (Ten) Days., Disp: 9 each, Rfl: 1    Glucagon (Gvoke HypoPen 1-Pack) 1 MG/0.2ML solution auto-injector, Use as directed for emergently low blood glucose level., Disp: 0.2 mL, Rfl: 0    glucose (DEX4) 4 GM chewable tablet, Chew 1 tablet Daily., Disp: 50 tablet, Rfl: 3    HumaLOG KwikPen 100 UNIT/ML solution pen-injector, Inject 8-10 Units under the skin into the appropriate area as directed 3 (Three) Times a Day Before Meals., Disp: 30 mL, Rfl: 0    Insulin Glargine (LANTUS SOLOSTAR) 100 UNIT/ML injection pen, Inject 17 Units under the skin into the appropriate area as  directed Daily., Disp: 15 mL, Rfl: 0    ondansetron ODT (ZOFRAN-ODT) 4 MG disintegrating tablet, Place 1 tablet on the tongue Every 8 (Eight) Hours As Needed for Nausea or Vomiting., Disp: 20 tablet, Rfl: 0    promethazine-dextromethorphan (PROMETHAZINE-DM) 6.25-15 MG/5ML syrup, Take 5 mL by mouth 4 (Four) Times a Day As Needed for Cough., Disp: 120 mL, Rfl: 0  Medicines reviewed by Ambar Green RP on 3/13/2025 at  3:21 PM    Drug Interactions  No clinically relevant interactions found. Cough medication was given while sick. Patient did report his blood sugars being elevated while sick.     Adverse Drug Reactions  Medication tolerability: Tolerating with no to minimal ADRs  Medication plan: Continue therapy with normal follow-up  Plan for ADR Management: Minimal hypoglycemia reported. Patient states that some week he has no low blood sugar alerts and some up to 3 days of the week. He did state that most events tend to be when he isn't feeling well.    Hospitalizations and Urgent Care Since Last Assessment  Hospitalizations or Admissions: none  ED Visits:  Patient tested positive for flu A. He is recovered.   Urgent Office Visits:  Patient tested positive for flu A. He was seen at  then went to ER.      Adherence, Self-Administration, and Current Therapy Problems  Adherence related to the patient's specialty therapy was discussed with the patient. The Adherence segment of this outreach has been reviewed and updated.     Adherence Questions  Linked Medication(s) Assessed: Continuous Glucose Sensor (Dexcom G7 Sensor), Insulin Lispro (HumaLOG KwikPen), Insulin Glargine (LANTUS SOLOSTAR)  On average, how many doses/injections does the patient miss per month?: 0 (Patient states he uses LA daily and SA everytime he eats)  What are the identified reasons for non-adherence or missed doses? : no problems identified (Patient using Novolog still from 2023. Patient is checking expiration. Patient was getting 1 box (15  ml) for 23 days supply. His dose is only 15 units/daily. It is a 100 day supply. It is possible that he does have additional supply at home.)  What is the estimated medication adherence level?: 80-89%  Based on the patient/caregiver response and refill history, does this patient require an MTP to track adherence improvements?: no    Additional Barriers to Patient Self-Administration: None  Methods for Supporting Patient Self-Administration: None    Open Medication Therapy Problems  No medication therapy recommendations to display    Goals of Therapy  Goals related to the patient's specialty therapy were discussed with the patient. The Patient Goals segment of this outreach has been reviewed and updated.   Goals Addressed Today        Specialty Pharmacy General Goal      A1c < 7%    Lab Results   Component Value Date    HGBA1C 9.10 (H) 12/27/2024    HGBA1C 10.20 (H) 05/21/2024    HGBA1C 10.40 (H) 01/15/2024    HGBA1C 12.40 (H) 07/19/2023    HGBA1C 13.10 (H) 12/09/2022    HGBA1C 11.5 (H) 07/07/2022       3/13/25: A1C reviewed.  Patient stated he had lots of supply of insulins that were not recently prescribed. Patient states he is using Novolog and Tresiba. I could not find an active/past script for Tresiba (17 units) and Novolog (7 to 12 units with each meal) was last prescribed in 12/2023 at 5 units three times daily. Patient also reports taking his Novolog only when he eats after I asked if he takes three times daily. So it is possible that patient is working through supply. We did discuss the importance of checking the expiration date on the box and that once he starts using a pen it should be discarded after 28 days. We have most recently dispensed Lantus and Humalog in 1/2025.   9/20/24: Patient enrolled in speciality pharmacy services. Current A1c 10.2 and adjustments made to insulin today. Next appointment scheduled 12/26/24 and likely to have labs at that time. Will follow up at reassessment.                Progress Toward Meeting Patient-Identified Goals of Therapy: Off Track  New Patient-Identified Goals, If Applicable:     Progress Toward Meeting Clinical Goals or Therapeutic Targets: Off Track  New Clinical Goals or Therapeutic Targets, If Applicable:     Quality of Life Assessment   Quality of Life related to the patient's enrollment in the patient management program and services provided was discussed with the patient. The QOL segment of this outreach has been reviewed and updated.  Quality of Life Improvement Scale: 7-Somewhat better    Reassessment Plan & Follow-Up  Medication Therapy Changes: none  Additional Plans, Therapy Recommendations, or Therapy Problems to Be Addressed:  I suggested to patient that he check all his insulin supply for  product since some of the medication was dispensed over 1 year ago. We also discussed when to dispose of an active pen. Lantus, Novolog and Humalog should be discarded after being open 28 days. He has an appointment with Dr. Pike on 3/21/25 where he will likely get an A1C check. He mentioned problem with Dexcom sensors not functioning and we discussed calling the  and reporting the issue so they could get him a new sensor. Sometimes he reports putting the sensor on a muscle and we discussed correct placement on the back of the arm.     Pharmacist to perform regular reassessments no more than (6) months from the previous assessment.  Care Coordinator to set up future refill outreaches, coordinate prescription delivery, and escalate clinical questions to pharmacist.     Attestation  I attest the patient was actively involved in and has agreed to the above plan of care. I attest that the specialty medication(s) addressed above are appropriate for the patient based on my reassessment. If the prescribed therapy is at any point deemed not appropriate based on the current or future assessments, a consultation will be initiated with the patient's specialty  care provider to determine the best course of action. The revised plan of therapy will be documented along with any required assessments and/or additional patient education provided.     Electronically signed by Ambar Green RPH, 03/13/25, 3:28 PM EDT.

## 2025-03-21 ENCOUNTER — SPECIALTY PHARMACY (OUTPATIENT)
Dept: ENDOCRINOLOGY | Age: 25
End: 2025-03-21
Payer: COMMERCIAL

## 2025-03-21 ENCOUNTER — OFFICE VISIT (OUTPATIENT)
Dept: ENDOCRINOLOGY | Age: 25
End: 2025-03-21
Payer: COMMERCIAL

## 2025-03-21 VITALS
HEIGHT: 72 IN | WEIGHT: 179.6 LBS | HEART RATE: 65 BPM | OXYGEN SATURATION: 98 % | DIASTOLIC BLOOD PRESSURE: 72 MMHG | BODY MASS INDEX: 24.33 KG/M2 | SYSTOLIC BLOOD PRESSURE: 132 MMHG | TEMPERATURE: 98 F

## 2025-03-21 DIAGNOSIS — H54.40 BLINDNESS OF RIGHT EYE WITH NORMAL VISION IN CONTRALATERAL EYE: ICD-10-CM

## 2025-03-21 DIAGNOSIS — R76.8 POSITIVE GAD ANTIBODY: ICD-10-CM

## 2025-03-21 DIAGNOSIS — E23.6 PITUITARY CYST: ICD-10-CM

## 2025-03-21 DIAGNOSIS — E78.5 HYPERLIPIDEMIA, UNSPECIFIED HYPERLIPIDEMIA TYPE: ICD-10-CM

## 2025-03-21 DIAGNOSIS — E10.9 TYPE 1 DIABETES MELLITUS WITHOUT COMPLICATIONS: Primary | ICD-10-CM

## 2025-03-21 NOTE — PROGRESS NOTES
Specialty Pharmacy Patient Management Program  Refill Outreach     [facility] was contacted today regarding refills of their medication(s).        Delivery Questions      Flowsheet Row Most Recent Value   Delivery method  at Pharmacy  [ at pharmacy 3/21/25 after appt]   Medication(s) being filled and delivered Continuous Glucose Sensor (Dexcom G7 Sensor)   Doses left of specialty medications N/A   Copay verified? Yes   Copay amount $0   Copay form of payment No copayment ($0)            Medication Adherence    Adherence tools used: alarm  Support network for adherence: family member, healthcare provider          Follow-up: 23 day(s)     Susan Cavazos, Pharmacy Technician  3/21/2025  11:50 EDT

## 2025-03-21 NOTE — PROGRESS NOTES
Macey Sparks II is a 24 y.o. male.     History of Present Illness     He was diagnosed to have diabetes mellitus in October 2021 and was initially treated with Metformin.  He was admitted to the hospital in November 2021 in diabetic ketoacidosis and insulin was started.  MARLENI antibody was elevated.  He had difficulty with Omnipod in the past.  His last meal was at 8:30 AM.     He is on Tresiba 17 units every PM and Novolog 8-10 units TID + sliding scale.  He has gained 6 pounds since December 2024.     His last eye examination was 12/22.  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  He denies blurry vision.  He denies numbness, tingling or burning in his hands or feet.  Urine microalbumin was normal in December 2024     He has hyperlipidemia and is on diet alone.     MRI of the pituitary done in November 2021 showed a localized widening of the left side of the sella turcica containing his CSF consistent with small arachnoid cyst or partially empty sella.  Lab studies done in November 2021 are as follows: Normal prolactin at 14.30.  Normal TSH of 0.966.  Normal free T4 of 1.36 ng per DL.  Normal free T3 at 2.43 pg/mL.      MRI of the pituitary done in February 2022 showed stable appearance of a cystic structure involving the sella laterally to the left.  This represent an arachnoid cyst or simple cyst.     He denies easy bruising.  He denies muscle weakness.  He denies heat or cold intolerance.  He denies bowel changes.  He denies changes in shoes or ring size.  He denies erectile dysfunction.     He has fathered 3 children.  He is considering having a vasectomy.  He denies alcohol, tobacco or drug abuse.    The following portions of the patient's history were reviewed and updated as appropriate: allergies, current medications, past family history, past medical history, past social history, past surgical history, and problem list.    Review of Systems   Eyes:  Negative for  "visual disturbance.   Respiratory:  Negative for shortness of breath and wheezing.    Gastrointestinal: Negative.    Genitourinary: Negative.    Musculoskeletal:  Negative for myalgias.   Neurological:  Negative for weakness and numbness.   Hematological:  Does not bruise/bleed easily.     Vitals:    03/21/25 1142   BP: 132/72   Pulse: 65   Temp: 98 °F (36.7 °C)   TempSrc: Oral   SpO2: 98%   Weight: 81.5 kg (179 lb 9.6 oz)   Height: 182.9 cm (72.01\")      Objective   Physical Exam  Constitutional:       General: He is not in acute distress.     Appearance: Normal appearance. He is not ill-appearing.   Eyes:      General: No scleral icterus.        Right eye: No discharge.         Left eye: No discharge.   Cardiovascular:      Rate and Rhythm: Normal rate and regular rhythm.      Heart sounds: Normal heart sounds. No murmur heard.     No friction rub. No gallop.   Pulmonary:      Breath sounds: Normal breath sounds. No rales.   Chest:      Chest wall: No tenderness.   Abdominal:      General: Bowel sounds are normal.      Palpations: Abdomen is soft.      Tenderness: There is no right CVA tenderness or left CVA tenderness.   Musculoskeletal:      Right lower leg: No edema.      Left lower leg: No edema.      Comments: Feet warm.  No cyanosis, pedal edema or clubbing.  No plantar ulcers.   Neurological:      Mental Status: He is alert and oriented to person, place, and time.      Comments: Intact light touch in lower extremities.       Admission on 02/06/2025, Discharged on 02/06/2025   Component Date Value Ref Range Status    Glucose 02/06/2025 271 (H)  65 - 99 mg/dL Final    BUN 02/06/2025 12  6 - 20 mg/dL Final    Creatinine 02/06/2025 1.13  0.76 - 1.27 mg/dL Final    Sodium 02/06/2025 138  136 - 145 mmol/L Final    Potassium 02/06/2025 3.5  3.5 - 5.2 mmol/L Final    Chloride 02/06/2025 105  98 - 107 mmol/L Final    CO2 02/06/2025 24.0  22.0 - 29.0 mmol/L Final    Calcium 02/06/2025 8.2 (L)  8.6 - 10.5 mg/dL Final "    Total Protein 02/06/2025 6.7  6.0 - 8.5 g/dL Final    Albumin 02/06/2025 4.0  3.5 - 5.2 g/dL Final    ALT (SGPT) 02/06/2025 14  1 - 41 U/L Final    AST (SGOT) 02/06/2025 19  1 - 40 U/L Final    Alkaline Phosphatase 02/06/2025 74  39 - 117 U/L Final    Total Bilirubin 02/06/2025 0.3  0.0 - 1.2 mg/dL Final    Globulin 02/06/2025 2.7  gm/dL Final    A/G Ratio 02/06/2025 1.5  g/dL Final    BUN/Creatinine Ratio 02/06/2025 10.6  7.0 - 25.0 Final    Anion Gap 02/06/2025 9.0  5.0 - 15.0 mmol/L Final    eGFR 02/06/2025 91.9  >60.0 mL/min/1.73 Final    Lipase 02/06/2025 16  13 - 60 U/L Final    ADENOVIRUS, PCR 02/06/2025 Not Detected  Not Detected Final    Coronavirus 229E 02/06/2025 Not Detected  Not Detected Final    Coronavirus HKU1 02/06/2025 Not Detected  Not Detected Final    Coronavirus NL63 02/06/2025 Not Detected  Not Detected Final    Coronavirus OC43 02/06/2025 Not Detected  Not Detected Final    COVID19 02/06/2025 Not Detected  Not Detected - Ref. Range Final    Human Metapneumovirus 02/06/2025 Not Detected  Not Detected Final    Human Rhinovirus/Enterovirus 02/06/2025 Not Detected  Not Detected Final    Influenza A H3 02/06/2025 Detected (A)  Not Detected Final    Influenza B PCR 02/06/2025 Not Detected  Not Detected Final    Parainfluenza Virus 1 02/06/2025 Not Detected  Not Detected Final    Parainfluenza Virus 2 02/06/2025 Not Detected  Not Detected Final    Parainfluenza Virus 3 02/06/2025 Not Detected  Not Detected Final    Parainfluenza Virus 4 02/06/2025 Not Detected  Not Detected Final    RSV, PCR 02/06/2025 Not Detected  Not Detected Final    Bordetella pertussis pcr 02/06/2025 Not Detected  Not Detected Final    Bordetella parapertussis PCR 02/06/2025 Not Detected  Not Detected Final    Chlamydophila pneumoniae PCR 02/06/2025 Not Detected  Not Detected Final    Mycoplasma pneumo by PCR 02/06/2025 Not Detected  Not Detected Final    WBC 02/06/2025 6.32  3.40 - 10.80 10*3/mm3 Final    RBC 02/06/2025  4.72  4.14 - 5.80 10*6/mm3 Final    Hemoglobin 02/06/2025 14.3  13.0 - 17.7 g/dL Final    Hematocrit 02/06/2025 41.9  37.5 - 51.0 % Final    MCV 02/06/2025 88.8  79.0 - 97.0 fL Final    MCH 02/06/2025 30.3  26.6 - 33.0 pg Final    MCHC 02/06/2025 34.1  31.5 - 35.7 g/dL Final    RDW 02/06/2025 13.8  12.3 - 15.4 % Final    RDW-SD 02/06/2025 43.9  37.0 - 54.0 fl Final    MPV 02/06/2025 10.5  6.0 - 12.0 fL Final    Platelets 02/06/2025 236  140 - 450 10*3/mm3 Final    Neutrophil % 02/06/2025 79.8 (H)  42.7 - 76.0 % Final    Lymphocyte % 02/06/2025 8.9 (L)  19.6 - 45.3 % Final    Monocyte % 02/06/2025 8.2  5.0 - 12.0 % Final    Eosinophil % 02/06/2025 2.5  0.3 - 6.2 % Final    Basophil % 02/06/2025 0.3  0.0 - 1.5 % Final    Immature Grans % 02/06/2025 0.3  0.0 - 0.5 % Final    Neutrophils, Absolute 02/06/2025 5.04  1.70 - 7.00 10*3/mm3 Final    Lymphocytes, Absolute 02/06/2025 0.56 (L)  0.70 - 3.10 10*3/mm3 Final    Monocytes, Absolute 02/06/2025 0.52  0.10 - 0.90 10*3/mm3 Final    Eosinophils, Absolute 02/06/2025 0.16  0.00 - 0.40 10*3/mm3 Final    Basophils, Absolute 02/06/2025 0.02  0.00 - 0.20 10*3/mm3 Final    Immature Grans, Absolute 02/06/2025 0.02  0.00 - 0.05 10*3/mm3 Final    nRBC 02/06/2025 0.0  0.0 - 0.2 /100 WBC Final    QT Interval 02/06/2025 342  ms Final    QTC Interval 02/06/2025 406  ms Final    HS Troponin T 02/06/2025 9  <22 ng/L Final    HS Troponin T 02/06/2025 11  <22 ng/L Final    Troponin T Numeric Delta 02/06/2025 2  Abnormal if >/=3 ng/L Final    Glucose 02/06/2025 139 (H)  70 - 130 mg/dL Final     Assessment & Plan   Diagnoses and all orders for this visit:    1. Type 1 diabetes mellitus without complications (Primary)  -     Comprehensive Metabolic Panel  -     Hemoglobin A1c  -     TSH  -     T4, Free    2. Positive MARLENI antibody    3. 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    4. Hyperlipidemia, unspecified hyperlipidemia type  -     Comprehensive Metabolic Panel  -      Lipid Panel    5. Pituitary cyst  -     Insulin-like Growth Factor  -     TSH  -     T4, Free  -     Prolactin      Continue Tresiba and NovoLog.  Discussed with patient other insulin pumps.  Advised patient to have an eye exam in the near future and yearly thereafter.    Continue no concentrated sweet low-fat diet.    Will check anterior pituitary function.    Copy of my note sent to Dr. Albrecht.    Follow-up in 4 months.

## 2025-03-23 LAB
ALBUMIN SERPL-MCNC: 4.5 G/DL (ref 3.5–5.2)
ALBUMIN/GLOB SERPL: 1.6 G/DL
ALP SERPL-CCNC: 73 U/L (ref 39–117)
ALT SERPL-CCNC: 14 U/L (ref 1–41)
AST SERPL-CCNC: 23 U/L (ref 1–40)
BILIRUB SERPL-MCNC: 0.3 MG/DL (ref 0–1.2)
BUN SERPL-MCNC: 12 MG/DL (ref 6–20)
BUN/CREAT SERPL: 10.4 (ref 7–25)
CALCIUM SERPL-MCNC: 9.7 MG/DL (ref 8.6–10.5)
CHLORIDE SERPL-SCNC: 104 MMOL/L (ref 98–107)
CHOLEST SERPL-MCNC: 151 MG/DL (ref 0–200)
CO2 SERPL-SCNC: 23.5 MMOL/L (ref 22–29)
CREAT SERPL-MCNC: 1.15 MG/DL (ref 0.76–1.27)
EGFRCR SERPLBLD CKD-EPI 2021: 90 ML/MIN/1.73
GLOBULIN SER CALC-MCNC: 2.9 GM/DL
GLUCOSE SERPL-MCNC: 90 MG/DL (ref 65–99)
HBA1C MFR BLD: 7.5 % (ref 4.8–5.6)
HDLC SERPL-MCNC: 47 MG/DL (ref 40–60)
IGF-I SERPL-MCNC: 300 NG/ML (ref 101–307)
IMP & REVIEW OF LAB RESULTS: NORMAL
LDLC SERPL CALC-MCNC: 93 MG/DL (ref 0–100)
POTASSIUM SERPL-SCNC: 4.8 MMOL/L (ref 3.5–5.2)
PROLACTIN SERPL-MCNC: 11.1 NG/ML (ref 3.6–31.5)
PROT SERPL-MCNC: 7.4 G/DL (ref 6–8.5)
SODIUM SERPL-SCNC: 139 MMOL/L (ref 136–145)
T4 FREE SERPL-MCNC: 1.26 NG/DL (ref 0.92–1.68)
TRIGL SERPL-MCNC: 50 MG/DL (ref 0–150)
TSH SERPL DL<=0.005 MIU/L-ACNC: 1.95 UIU/ML (ref 0.27–4.2)
VLDLC SERPL CALC-MCNC: 11 MG/DL (ref 5–40)

## 2025-03-27 ENCOUNTER — OFFICE VISIT (OUTPATIENT)
Dept: FAMILY MEDICINE CLINIC | Facility: CLINIC | Age: 25
End: 2025-03-27
Payer: COMMERCIAL

## 2025-03-27 VITALS
SYSTOLIC BLOOD PRESSURE: 116 MMHG | OXYGEN SATURATION: 97 % | DIASTOLIC BLOOD PRESSURE: 80 MMHG | WEIGHT: 179 LBS | HEART RATE: 72 BPM | BODY MASS INDEX: 24.24 KG/M2 | HEIGHT: 72 IN

## 2025-03-27 DIAGNOSIS — Z30.09 VASECTOMY EVALUATION: Primary | ICD-10-CM
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
## 2025-04-17 ENCOUNTER — SPECIALTY PHARMACY (OUTPATIENT)
Dept: ENDOCRINOLOGY | Age: 25
End: 2025-04-17
Payer: COMMERCIAL

## 2025-04-17 NOTE — PROGRESS NOTES
Specialty Pharmacy Patient Management Program  Refill Outreach     [facility] was contacted today regarding refills of their medication(s).        Delivery Questions      Flowsheet Row Most Recent Value   Delivery method  at Pharmacy  [ at pharmacy 4/17/25 Anytime]   Medication(s) being filled and delivered Continuous Glucose Sensor (Dexcom G7 Sensor)   Doses left of specialty medications N/A   Copay verified? Yes   Copay amount $0   Copay form of payment No copayment ($0)            Medication Adherence    Adherence tools used: alarm  Support network for adherence: family member, healthcare provider          Follow-up: 23 day(s)     Susan Cavazos, Pharmacy Technician  4/17/2025  09:01 EDT

## 2025-05-15 ENCOUNTER — SPECIALTY PHARMACY (OUTPATIENT)
Dept: ENDOCRINOLOGY | Age: 25
End: 2025-05-15
Payer: COMMERCIAL

## 2025-05-15 NOTE — PROGRESS NOTES
Specialty Pharmacy Patient Management Program  Refill Outreach     [facility] was contacted today regarding refills of their medication(s).        Delivery Questions      Flowsheet Row Most Recent Value   Delivery method  at Pharmacy  [ at pharmacy 5/15/25 Anytime]   Medication(s) being filled and delivered Continuous Glucose Sensor (Dexcom G7 Sensor)   Doses left of specialty medications N/A   Copay verified? Yes   Copay amount $0   Copay form of payment No copayment ($0)            Medication Adherence    Adherence tools used: alarm  Support network for adherence: family member, healthcare provider          Follow-up: 23 day(s)     Susan Cavazos, Pharmacy Technician  5/15/2025  08:22 EDT

## 2025-06-09 ENCOUNTER — SPECIALTY PHARMACY (OUTPATIENT)
Dept: ENDOCRINOLOGY | Age: 25
End: 2025-06-09
Payer: COMMERCIAL

## 2025-06-09 NOTE — PROGRESS NOTES
Specialty Pharmacy Patient Management Program  Refill Outreach     [facility] was contacted today regarding refills of their medication(s).        Delivery Questions      Flowsheet Row Most Recent Value   Delivery method  at Pharmacy  [ at pharmacy 6/10/25 Early AM]   Medication(s) being filled and delivered Insulin Glargine (LANTUS SOLOSTAR)   Doses left of specialty medications N/A   Copay verified? Yes   Copay amount $0   Copay form of payment No copayment ($0)            Medication Adherence    Adherence tools used: alarm  Support network for adherence: family member, healthcare provider          Follow-up: 81 day(s)     Susan Cavazos, Pharmacy Technician  6/9/2025  10:53 EDT

## 2025-06-13 ENCOUNTER — SPECIALTY PHARMACY (OUTPATIENT)
Dept: ENDOCRINOLOGY | Age: 25
End: 2025-06-13
Payer: COMMERCIAL

## 2025-06-13 NOTE — PROGRESS NOTES
Specialty Pharmacy Patient Management Program  Refill Outreach     [facility] was contacted today regarding refills of their medication(s).        Delivery Questions      Flowsheet Row Most Recent Value   Delivery method  at Pharmacy  [ at pharmacy 6/13/25 Asap]   Medication(s) being filled and delivered Continuous Glucose Sensor (Dexcom G7 Sensor)   Doses left of specialty medications N/A   Copay verified? Yes   Copay amount $0   Copay form of payment No copayment ($0)            Medication Adherence    Adherence tools used: alarm  Support network for adherence: family member, healthcare provider          Follow-up: 23 day(s)     Susan Cavazos, Pharmacy Technician  6/13/2025  11:10 EDT

## 2025-06-17 ENCOUNTER — SPECIALTY PHARMACY (OUTPATIENT)
Dept: ENDOCRINOLOGY | Age: 25
End: 2025-06-17
Payer: COMMERCIAL

## 2025-06-17 NOTE — PROGRESS NOTES
Specialty Pharmacy Patient Management Program  Refill Outreach     [facility] was contacted today regarding refills of their medication(s).        Delivery Questions      Flowsheet Row Most Recent Value   Delivery method  at Pharmacy  [ at pharmacy 6/18/25 Anytime]   Medication(s) being filled and delivered diazePAM (VALIUM)   Doses left of specialty medications N/A   Copay verified? Yes   Copay amount $0   Copay form of payment No copayment ($0)            Medication Adherence    Adherence tools used: alarm  Support network for adherence: family member, healthcare provider          No follow up needed     Susan Cavazos, Pharmacy Technician  6/17/2025  10:29 EDT

## 2025-07-05 DIAGNOSIS — E10.65 TYPE 1 DIABETES MELLITUS WITH HYPERGLYCEMIA: ICD-10-CM

## 2025-07-07 RX ORDER — INSULIN GLARGINE 100 [IU]/ML
17 INJECTION, SOLUTION SUBCUTANEOUS NIGHTLY
Qty: 18 ML | Refills: 2 | Status: SHIPPED | OUTPATIENT
Start: 2025-07-07

## 2025-07-07 NOTE — TELEPHONE ENCOUNTER
Rx Refill Note  Requested Prescriptions     Pending Prescriptions Disp Refills    Insulin Glargine (Lantus SoloStar) 100 UNIT/ML injection pen 15 mL 0     Sig: Inject 17 Units under the skin into the appropriate area as directed Daily.      Last office visit with prescribing clinician: 12/27/2024   Last telemedicine visit with prescribing clinician: Visit date not found   Next office visit with prescribing clinician: Visit date not found                         Would you like a call back once the refill request has been completed: [] Yes [] No    If the office needs to give you a call back, can they leave a voicemail: [] Yes [] No    Baylee Lopez  07/07/25, 08:03 EDT  Baylee Lopez  7/7/2025  08:03 EDT

## 2025-07-08 ENCOUNTER — PRIOR AUTHORIZATION (OUTPATIENT)
Dept: ENDOCRINOLOGY | Age: 25
End: 2025-07-08
Payer: COMMERCIAL

## 2025-07-08 NOTE — TELEPHONE ENCOUNTER
Prior Authorization       Prior Authorization for HumaLOG KwikPen 100UNIT/ML pen-injectors  is under review.    [employer]Meds [place]: [id number]        Baylee Lopez  7/8/2025  09:35 EDT

## 2025-07-08 NOTE — TELEPHONE ENCOUNTER
Prior Authorization       Prior Authorization for HumaLOG KwikPen 100UNIT/ML pen-injectors was Approved    Approval Start Date: 07/08/2025  Approval End Date: 07/07/2026  Authorization / Reference / Case Number: [id number]    CoverMyMeds Key: [id number]

## 2025-07-11 ENCOUNTER — SPECIALTY PHARMACY (OUTPATIENT)
Dept: ENDOCRINOLOGY | Age: 25
End: 2025-07-11
Payer: COMMERCIAL

## 2025-07-11 NOTE — PROGRESS NOTES
Specialty Pharmacy Patient Management Program  Refill Outreach     [facility] was contacted today regarding refills of their medication(s).        Delivery Questions      Flowsheet Row Most Recent Value   Delivery method  at Pharmacy  [ at pharmacy 7/14/25 After 11am]   Medication(s) being filled and delivered Continuous Glucose Sensor (Dexcom G7 Sensor), Insulin Lispro (HumaLOG KwikPen)   Doses left of specialty medications N/A   Copay verified? Yes   Copay amount $0   Copay form of payment No copayment ($0)            Medication Adherence    Adherence tools used: alarm  Support network for adherence: family member, healthcare provider          Follow-up: 23 day(s)     Susan Cavazos, Pharmacy Technician  7/11/2025  09:52 EDT

## 2025-07-28 ENCOUNTER — OFFICE VISIT (OUTPATIENT)
Dept: FAMILY MEDICINE CLINIC | Facility: CLINIC | Age: 25
End: 2025-07-28
Payer: COMMERCIAL

## 2025-07-28 VITALS
RESPIRATION RATE: 16 BRPM | WEIGHT: 163 LBS | BODY MASS INDEX: 22.08 KG/M2 | HEART RATE: 69 BPM | OXYGEN SATURATION: 99 % | SYSTOLIC BLOOD PRESSURE: 112 MMHG | HEIGHT: 72 IN | DIASTOLIC BLOOD PRESSURE: 76 MMHG

## 2025-07-28 DIAGNOSIS — E78.49 OTHER HYPERLIPIDEMIA: ICD-10-CM

## 2025-07-28 DIAGNOSIS — F41.9 ANXIETY: ICD-10-CM

## 2025-07-28 DIAGNOSIS — E10.65 TYPE 1 DIABETES MELLITUS WITH HYPERGLYCEMIA: Primary | ICD-10-CM

## 2025-07-28 PROCEDURE — 1160F RVW MEDS BY RX/DR IN RCRD: CPT | Performed by: INTERNAL MEDICINE

## 2025-07-28 PROCEDURE — 1126F AMNT PAIN NOTED NONE PRSNT: CPT | Performed by: INTERNAL MEDICINE

## 2025-07-28 PROCEDURE — 1159F MED LIST DOCD IN RCRD: CPT | Performed by: INTERNAL MEDICINE

## 2025-07-28 PROCEDURE — 99213 OFFICE O/P EST LOW 20 MIN: CPT | Performed by: INTERNAL MEDICINE

## 2025-07-28 PROCEDURE — 3051F HG A1C>EQUAL 7.0%<8.0%: CPT | Performed by: INTERNAL MEDICINE
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
## 2025-08-12 ENCOUNTER — SPECIALTY PHARMACY (OUTPATIENT)
Dept: ENDOCRINOLOGY | Age: 25
End: 2025-08-12
Payer: COMMERCIAL

## 2025-08-12 DIAGNOSIS — E10.65 TYPE 1 DIABETES MELLITUS WITH HYPERGLYCEMIA: ICD-10-CM

## 2025-08-12 RX ORDER — INSULIN LISPRO 100 [IU]/ML
8-10 INJECTION, SOLUTION INTRAVENOUS; SUBCUTANEOUS
Qty: 33 ML | Refills: 1 | Status: SHIPPED | OUTPATIENT
Start: 2025-08-12

## 2025-08-14 ENCOUNTER — OFFICE VISIT (OUTPATIENT)
Dept: ENDOCRINOLOGY | Age: 25
End: 2025-08-14
Payer: COMMERCIAL

## 2025-08-14 ENCOUNTER — OFFICE VISIT (OUTPATIENT)
Dept: FAMILY MEDICINE CLINIC | Facility: CLINIC | Age: 25
End: 2025-08-14
Payer: COMMERCIAL

## 2025-08-14 VITALS
SYSTOLIC BLOOD PRESSURE: 116 MMHG | HEIGHT: 72 IN | OXYGEN SATURATION: 99 % | BODY MASS INDEX: 22.57 KG/M2 | HEART RATE: 59 BPM | TEMPERATURE: 97.9 F | DIASTOLIC BLOOD PRESSURE: 72 MMHG | WEIGHT: 166.6 LBS

## 2025-08-14 VITALS
OXYGEN SATURATION: 98 % | BODY MASS INDEX: 22.62 KG/M2 | SYSTOLIC BLOOD PRESSURE: 130 MMHG | HEIGHT: 72 IN | WEIGHT: 167 LBS | HEART RATE: 62 BPM | DIASTOLIC BLOOD PRESSURE: 80 MMHG

## 2025-08-14 DIAGNOSIS — E10.9 TYPE 1 DIABETES MELLITUS WITHOUT COMPLICATIONS: Primary | ICD-10-CM

## 2025-08-14 DIAGNOSIS — Z13.0 SCREENING FOR DEFICIENCY ANEMIA: ICD-10-CM

## 2025-08-14 DIAGNOSIS — E78.5 HYPERLIPIDEMIA, UNSPECIFIED HYPERLIPIDEMIA TYPE: ICD-10-CM

## 2025-08-14 DIAGNOSIS — Z13.6 SCREENING FOR HYPERTENSION: ICD-10-CM

## 2025-08-14 DIAGNOSIS — E23.6 PITUITARY CYST: ICD-10-CM

## 2025-08-14 DIAGNOSIS — H54.40 BLINDNESS OF RIGHT EYE WITH NORMAL VISION IN CONTRALATERAL EYE: ICD-10-CM

## 2025-08-14 DIAGNOSIS — F32.2 CURRENT SEVERE EPISODE OF MAJOR DEPRESSIVE DISORDER WITHOUT PSYCHOTIC FEATURES, UNSPECIFIED WHETHER RECURRENT: ICD-10-CM

## 2025-08-14 DIAGNOSIS — E23.6 PITUITARY CYST: Primary | ICD-10-CM

## 2025-08-14 DIAGNOSIS — E10.65 TYPE 1 DIABETES MELLITUS WITH HYPERGLYCEMIA: ICD-10-CM

## 2025-08-14 DIAGNOSIS — R76.8 POSITIVE GAD ANTIBODY: ICD-10-CM

## 2025-08-14 PROCEDURE — 1159F MED LIST DOCD IN RCRD: CPT | Performed by: INTERNAL MEDICINE

## 2025-08-14 PROCEDURE — 1160F RVW MEDS BY RX/DR IN RCRD: CPT | Performed by: INTERNAL MEDICINE

## 2025-08-14 PROCEDURE — 1126F AMNT PAIN NOTED NONE PRSNT: CPT | Performed by: INTERNAL MEDICINE

## 2025-08-14 PROCEDURE — 99214 OFFICE O/P EST MOD 30 MIN: CPT | Performed by: INTERNAL MEDICINE

## 2025-08-14 PROCEDURE — 3051F HG A1C>EQUAL 7.0%<8.0%: CPT | Performed by: INTERNAL MEDICINE

## 2025-08-14 RX ORDER — INSULIN GLARGINE 100 [IU]/ML
INJECTION, SOLUTION SUBCUTANEOUS
Status: SHIPPED
Start: 2025-08-14

## 2025-08-14 RX ORDER — ESCITALOPRAM OXALATE 10 MG/1
10 TABLET ORAL DAILY
Qty: 30 TABLET | Refills: 2 | Status: SHIPPED | OUTPATIENT
Start: 2025-08-14

## 2025-08-15 ENCOUNTER — SPECIALTY PHARMACY (OUTPATIENT)
Dept: ENDOCRINOLOGY | Age: 25
End: 2025-08-15
Payer: COMMERCIAL

## 2025-08-15 LAB
ALBUMIN SERPL-MCNC: 4.4 G/DL (ref 4.3–5.2)
ALP SERPL-CCNC: 70 IU/L (ref 44–121)
ALT SERPL-CCNC: 11 IU/L (ref 0–44)
AST SERPL-CCNC: 12 IU/L (ref 0–40)
BASOPHILS # BLD AUTO: 0 X10E3/UL (ref 0–0.2)
BASOPHILS NFR BLD AUTO: 1 %
BILIRUB SERPL-MCNC: 0.3 MG/DL (ref 0–1.2)
BUN SERPL-MCNC: 18 MG/DL (ref 6–20)
BUN/CREAT SERPL: 18 (ref 9–20)
CALCIUM SERPL-MCNC: 9 MG/DL (ref 8.7–10.2)
CHLORIDE SERPL-SCNC: 102 MMOL/L (ref 96–106)
CO2 SERPL-SCNC: 23 MMOL/L (ref 20–29)
CREAT SERPL-MCNC: 1.02 MG/DL (ref 0.76–1.27)
EGFRCR SERPLBLD CKD-EPI 2021: 104 ML/MIN/1.73
EOSINOPHIL # BLD AUTO: 0.1 X10E3/UL (ref 0–0.4)
EOSINOPHIL NFR BLD AUTO: 3 %
ERYTHROCYTE [DISTWIDTH] IN BLOOD BY AUTOMATED COUNT: 13.9 % (ref 11.6–15.4)
GLOBULIN SER CALC-MCNC: 2.6 G/DL (ref 1.5–4.5)
GLUCOSE SERPL-MCNC: 240 MG/DL (ref 70–99)
HCT VFR BLD AUTO: 45 % (ref 37.5–51)
HGB BLD-MCNC: 14.6 G/DL (ref 13–17.7)
IMM GRANULOCYTES # BLD AUTO: 0 X10E3/UL (ref 0–0.1)
IMM GRANULOCYTES NFR BLD AUTO: 0 %
LYMPHOCYTES # BLD AUTO: 1.7 X10E3/UL (ref 0.7–3.1)
LYMPHOCYTES NFR BLD AUTO: 41 %
MCH RBC QN AUTO: 29.5 PG (ref 26.6–33)
MCHC RBC AUTO-ENTMCNC: 32.4 G/DL (ref 31.5–35.7)
MCV RBC AUTO: 91 FL (ref 79–97)
MONOCYTES # BLD AUTO: 0.2 X10E3/UL (ref 0.1–0.9)
MONOCYTES NFR BLD AUTO: 6 %
NEUTROPHILS # BLD AUTO: 2 X10E3/UL (ref 1.4–7)
NEUTROPHILS NFR BLD AUTO: 49 %
PLATELET # BLD AUTO: 217 X10E3/UL (ref 150–450)
POTASSIUM SERPL-SCNC: 4.6 MMOL/L (ref 3.5–5.2)
PROT SERPL-MCNC: 7 G/DL (ref 6–8.5)
RBC # BLD AUTO: 4.95 X10E6/UL (ref 4.14–5.8)
SODIUM SERPL-SCNC: 137 MMOL/L (ref 134–144)
WBC # BLD AUTO: 4.1 X10E3/UL (ref 3.4–10.8)

## 2025-08-19 ENCOUNTER — RESULTS FOLLOW-UP (OUTPATIENT)
Dept: FAMILY MEDICINE CLINIC | Facility: CLINIC | Age: 25
End: 2025-08-19
Payer: COMMERCIAL

## 2025-08-23 LAB
ALBUMIN SERPL-MCNC: 4.8 G/DL (ref 4.3–5.2)
ALP SERPL-CCNC: 78 IU/L (ref 44–121)
ALT SERPL-CCNC: 13 IU/L (ref 0–44)
AST SERPL-CCNC: 18 IU/L (ref 0–40)
BILIRUB SERPL-MCNC: 0.4 MG/DL (ref 0–1.2)
BUN SERPL-MCNC: 15 MG/DL (ref 6–20)
BUN/CREAT SERPL: 14 (ref 9–20)
CALCIUM SERPL-MCNC: 9.6 MG/DL (ref 8.7–10.2)
CHLORIDE SERPL-SCNC: 103 MMOL/L (ref 96–106)
CHOLEST SERPL-MCNC: 157 MG/DL (ref 100–199)
CO2 SERPL-SCNC: 20 MMOL/L (ref 20–29)
CREAT SERPL-MCNC: 1.1 MG/DL (ref 0.76–1.27)
EGFRCR SERPLBLD CKD-EPI 2021: 95 ML/MIN/1.73
GLOBULIN SER CALC-MCNC: 2.7 G/DL (ref 1.5–4.5)
GLUCOSE SERPL-MCNC: 92 MG/DL (ref 70–99)
HBA1C MFR BLD: 7.9 % (ref 4.8–5.6)
HDLC SERPL-MCNC: 65 MG/DL
IGF-I SERPL-MCNC: 203 NG/ML (ref 101–307)
IMP & REVIEW OF LAB RESULTS: NORMAL
LDLC SERPL CALC-MCNC: 82 MG/DL (ref 0–99)
MACROPROLACTIN/PROLACTIN MFR SERPL: 26 %
POTASSIUM SERPL-SCNC: 4.6 MMOL/L (ref 3.5–5.2)
PROLACTIN SERPL-MCNC: 9.26 NG/ML
PROLACTIN.MONOMERIC SERPL-MCNC: 6.88 NG/ML
PROT SERPL-MCNC: 7.5 G/DL (ref 6–8.5)
SODIUM SERPL-SCNC: 141 MMOL/L (ref 134–144)
T4 FREE SERPL-MCNC: 1.31 NG/DL (ref 0.82–1.77)
TRIGL SERPL-MCNC: 43 MG/DL (ref 0–149)
TSH SERPL DL<=0.005 MIU/L-ACNC: 1.78 UIU/ML (ref 0.45–4.5)
VLDLC SERPL CALC-MCNC: 10 MG/DL (ref 5–40)